# Patient Record
Sex: MALE | Race: WHITE | Employment: UNEMPLOYED | RURAL
[De-identification: names, ages, dates, MRNs, and addresses within clinical notes are randomized per-mention and may not be internally consistent; named-entity substitution may affect disease eponyms.]

---

## 2017-06-02 ENCOUNTER — HOSPITAL ENCOUNTER (OUTPATIENT)
Dept: NEUROLOGY | Age: 42
Discharge: HOME | End: 2017-06-02
Admitting: NURSE PRACTITIONER

## 2017-06-02 DIAGNOSIS — M79.606 PAIN OF LOWER EXTREMITY: ICD-10-CM

## 2018-12-10 ENCOUNTER — APPOINTMENT (OUTPATIENT)
Dept: GENERAL RADIOLOGY | Facility: HOSPITAL | Age: 43
End: 2018-12-10

## 2018-12-10 ENCOUNTER — HOSPITAL ENCOUNTER (EMERGENCY)
Facility: HOSPITAL | Age: 43
Discharge: HOME | End: 2018-12-10
Attending: EMERGENCY MEDICINE

## 2018-12-10 VITALS
OXYGEN SATURATION: 100 % | BODY MASS INDEX: 19.99 KG/M2 | DIASTOLIC BLOOD PRESSURE: 81 MMHG | SYSTOLIC BLOOD PRESSURE: 123 MMHG | RESPIRATION RATE: 18 BRPM | TEMPERATURE: 98.2 F | HEART RATE: 90 BPM | WEIGHT: 120 LBS | HEIGHT: 65 IN

## 2018-12-10 DIAGNOSIS — S20.211A CONTUSION OF RIGHT CHEST WALL, INITIAL ENCOUNTER: Primary | ICD-10-CM

## 2018-12-10 PROCEDURE — 73030 X-RAY EXAM OF SHOULDER: CPT

## 2018-12-10 PROCEDURE — 71101 X-RAY EXAM UNILAT RIBS/CHEST: CPT

## 2018-12-10 PROCEDURE — 99283 EMERGENCY DEPT VISIT LOW MDM: CPT

## 2018-12-10 PROCEDURE — 6370000000 HC RX 637 (ALT 250 FOR IP): Performed by: EMERGENCY MEDICINE

## 2018-12-10 RX ORDER — HYDROCODONE BITARTRATE AND ACETAMINOPHEN 7.5; 325 MG/1; MG/1
1 TABLET ORAL EVERY 6 HOURS PRN
Qty: 12 TABLET | Refills: 0 | Status: SHIPPED | OUTPATIENT
Start: 2018-12-10 | End: 2018-12-13

## 2018-12-10 RX ORDER — HYDROCODONE BITARTRATE AND ACETAMINOPHEN 5; 325 MG/1; MG/1
2 TABLET ORAL ONCE
Status: COMPLETED | OUTPATIENT
Start: 2018-12-10 | End: 2018-12-10

## 2018-12-10 RX ADMIN — HYDROCODONE BITARTRATE AND ACETAMINOPHEN 2 TABLET: 5; 325 TABLET ORAL at 20:51

## 2018-12-10 ASSESSMENT — PAIN SCALES - GENERAL
PAINLEVEL_OUTOF10: 9

## 2018-12-10 ASSESSMENT — PAIN DESCRIPTION - PAIN TYPE: TYPE: ACUTE PAIN

## 2018-12-10 ASSESSMENT — PAIN DESCRIPTION - DESCRIPTORS: DESCRIPTORS: BURNING

## 2018-12-10 ASSESSMENT — PAIN DESCRIPTION - PROGRESSION: CLINICAL_PROGRESSION: GRADUALLY WORSENING

## 2018-12-10 ASSESSMENT — PAIN DESCRIPTION - FREQUENCY: FREQUENCY: CONTINUOUS

## 2018-12-10 ASSESSMENT — PAIN DESCRIPTION - LOCATION: LOCATION: RIB CAGE;SHOULDER

## 2018-12-10 ASSESSMENT — PAIN DESCRIPTION - ORIENTATION: ORIENTATION: RIGHT

## 2018-12-11 NOTE — ED PROVIDER NOTES
8:33 PM          ALLERGIES     Patient has no known allergies. FAMILY HISTORY     History reviewed. No pertinent family history. SOCIAL HISTORY       Social History     Social History    Marital status: Single     Spouse name: N/A    Number of children: N/A    Years of education: N/A     Social History Main Topics    Smoking status: Current Every Day Smoker     Packs/day: 0.50     Types: Cigarettes    Smokeless tobacco: None    Alcohol use No    Drug use: No    Sexual activity: Not Asked     Other Topics Concern    None     Social History Narrative    None         PHYSICAL EXAM    (up to 7 for level 4, 8 or more for level 5)     ED Triage Vitals [12/10/18 1936]   BP Temp Temp Source Pulse Resp SpO2 Height Weight   117/74 98.2 °F (36.8 °C) Oral 87 16 100 % 5' 5\" (1.651 m) 120 lb (54.4 kg)       Physical Exam  General :Patient is awake, alert, oriented, in no acute distress, nontoxic appearing  HEENT: Pupils are equally round and reactive to light, EOMI. Oral mucosa is moist, no exudate. No pharyngeal erythema. Neck: Neck is supple, full range of motion  Cardiac: Heart regular rate, rhythm, no murmurs, rubs, or gallops  Lungs: Lungs are clear to auscultation, there is no wheezing, rhonchi, or rales. Chest wall: There is + tenderness to palpation over the right lower chest wall; there are 2 linear abrasions that are about 7cm long each which are superficial  Abdomen: Abdomen is soft, nontender, nondistended. There is no firm or pulsatile masses, no rebound, rigidity or guarding. Musculoskeletal: Right shoulder with diffuse tenderness to palpation with limited ability to abduct and adduct the shoulder; 5 out of 5 strength in all 4 extremities. No focal muscle deficits are appreciated  Back: No midline or bony tenderness. No CVAT. Neuro:  Motor intact, sensory intact, level of consciousness is normal.  Dermatology: Skin is warm and dry  Psych: Mentation is grossly normal,cognition is grossly

## 2019-04-08 ENCOUNTER — OFFICE VISIT (OUTPATIENT)
Dept: FAMILY MEDICINE CLINIC | Age: 44
End: 2019-04-08
Payer: COMMERCIAL

## 2019-04-08 ENCOUNTER — HOSPITAL ENCOUNTER (OUTPATIENT)
Facility: HOSPITAL | Age: 44
Discharge: HOME | End: 2019-04-08
Payer: COMMERCIAL

## 2019-04-08 VITALS
RESPIRATION RATE: 18 BRPM | WEIGHT: 122 LBS | SYSTOLIC BLOOD PRESSURE: 133 MMHG | OXYGEN SATURATION: 98 % | HEIGHT: 70 IN | HEART RATE: 79 BPM | DIASTOLIC BLOOD PRESSURE: 88 MMHG | BODY MASS INDEX: 17.47 KG/M2 | TEMPERATURE: 97.7 F

## 2019-04-08 DIAGNOSIS — J40 BRONCHITIS: Primary | ICD-10-CM

## 2019-04-08 DIAGNOSIS — R35.0 URINARY FREQUENCY: ICD-10-CM

## 2019-04-08 DIAGNOSIS — R68.89 FLU-LIKE SYMPTOMS: ICD-10-CM

## 2019-04-08 DIAGNOSIS — R53.83 FATIGUE, UNSPECIFIED TYPE: ICD-10-CM

## 2019-04-08 DIAGNOSIS — Z13.220 SCREENING FOR CHOLESTEROL LEVEL: ICD-10-CM

## 2019-04-08 DIAGNOSIS — Z11.4 SCREENING FOR HIV (HUMAN IMMUNODEFICIENCY VIRUS): ICD-10-CM

## 2019-04-08 DIAGNOSIS — K21.9 GASTROESOPHAGEAL REFLUX DISEASE WITHOUT ESOPHAGITIS: ICD-10-CM

## 2019-04-08 LAB
A/G RATIO: 1.9 (ref 0.8–2)
ALBUMIN SERPL-MCNC: 5 G/DL (ref 3.4–4.8)
ALP BLD-CCNC: 75 U/L (ref 25–100)
ALT SERPL-CCNC: 26 U/L (ref 4–36)
ANION GAP SERPL CALCULATED.3IONS-SCNC: 12 MMOL/L (ref 3–16)
APPEARANCE FLUID: CLEAR
AST SERPL-CCNC: 24 U/L (ref 8–33)
BASOPHILS ABSOLUTE: 0.1 K/UL (ref 0–0.1)
BASOPHILS RELATIVE PERCENT: 0.9 %
BILIRUB SERPL-MCNC: 0.7 MG/DL (ref 0.3–1.2)
BILIRUBIN, POC: NEGATIVE
BLOOD URINE, POC: NEGATIVE
BUN BLDV-MCNC: 14 MG/DL (ref 6–20)
CALCIUM SERPL-MCNC: 10 MG/DL (ref 8.5–10.5)
CHLORIDE BLD-SCNC: 99 MMOL/L (ref 98–107)
CHOLESTEROL, TOTAL: 211 MG/DL (ref 0–200)
CHP ED QC CHECK: NORMAL
CLARITY, POC: CLEAR
CO2: 27 MMOL/L (ref 20–30)
COLOR, POC: YELLOW
CREAT SERPL-MCNC: 0.9 MG/DL (ref 0.4–1.2)
EOSINOPHILS ABSOLUTE: 0.4 K/UL (ref 0–0.4)
EOSINOPHILS RELATIVE PERCENT: 5.2 %
GFR AFRICAN AMERICAN: >59
GFR NON-AFRICAN AMERICAN: >59
GLOBULIN: 2.6 G/DL
GLUCOSE BLD-MCNC: 79 MG/DL
GLUCOSE BLD-MCNC: 94 MG/DL (ref 74–106)
GLUCOSE URINE, POC: NEGATIVE
HCT VFR BLD CALC: 46.8 % (ref 40–54)
HDLC SERPL-MCNC: 55 MG/DL (ref 40–60)
HEMOGLOBIN: 15.8 G/DL (ref 13–18)
IMMATURE GRANULOCYTES #: 0 K/UL
IMMATURE GRANULOCYTES %: 0.2 % (ref 0–5)
INFLUENZA A ANTIBODY: NEGATIVE
INFLUENZA B ANTIBODY: NEGATIVE
KETONES, POC: NEGATIVE
LDL CHOLESTEROL CALCULATED: 107 MG/DL
LEUKOCYTE EST, POC: NEGATIVE
LYMPHOCYTES ABSOLUTE: 2.3 K/UL (ref 1.5–4)
LYMPHOCYTES RELATIVE PERCENT: 28 %
MCH RBC QN AUTO: 30.7 PG (ref 27–32)
MCHC RBC AUTO-ENTMCNC: 33.8 G/DL (ref 31–35)
MCV RBC AUTO: 90.9 FL (ref 80–100)
MONOCYTES ABSOLUTE: 0.4 K/UL (ref 0.2–0.8)
MONOCYTES RELATIVE PERCENT: 5.3 %
NEUTROPHILS ABSOLUTE: 4.9 K/UL (ref 2–7.5)
NEUTROPHILS RELATIVE PERCENT: 60.4 %
NITRITE, POC: NEGATIVE
PDW BLD-RTO: 12.1 % (ref 11–16)
PH, POC: 6.5
PLATELET # BLD: 293 K/UL (ref 150–400)
PMV BLD AUTO: 10 FL (ref 6–10)
POTASSIUM SERPL-SCNC: 5 MMOL/L (ref 3.4–5.1)
PROTEIN, POC: NEGATIVE
RBC # BLD: 5.15 M/UL (ref 4.5–6)
SODIUM BLD-SCNC: 138 MMOL/L (ref 136–145)
SPECIFIC GRAVITY, POC: 1.01
TOTAL PROTEIN: 7.6 G/DL (ref 6.4–8.3)
TRIGL SERPL-MCNC: 243 MG/DL (ref 0–249)
TSH REFLEX: 0.57 UIU/ML (ref 0.35–5.5)
UROBILINOGEN, POC: 0.2
VLDLC SERPL CALC-MCNC: 49 MG/DL
WBC # BLD: 8.1 K/UL (ref 4–11)

## 2019-04-08 PROCEDURE — 80061 LIPID PANEL: CPT

## 2019-04-08 PROCEDURE — 87804 INFLUENZA ASSAY W/OPTIC: CPT | Performed by: NURSE PRACTITIONER

## 2019-04-08 PROCEDURE — 87390 HIV-1 AG IA: CPT

## 2019-04-08 PROCEDURE — 4004F PT TOBACCO SCREEN RCVD TLK: CPT | Performed by: NURSE PRACTITIONER

## 2019-04-08 PROCEDURE — 82962 GLUCOSE BLOOD TEST: CPT | Performed by: NURSE PRACTITIONER

## 2019-04-08 PROCEDURE — 99204 OFFICE O/P NEW MOD 45 MIN: CPT | Performed by: NURSE PRACTITIONER

## 2019-04-08 PROCEDURE — 86702 HIV-2 ANTIBODY: CPT

## 2019-04-08 PROCEDURE — 85025 COMPLETE CBC W/AUTO DIFF WBC: CPT

## 2019-04-08 PROCEDURE — G8427 DOCREV CUR MEDS BY ELIG CLIN: HCPCS | Performed by: NURSE PRACTITIONER

## 2019-04-08 PROCEDURE — G8419 CALC BMI OUT NRM PARAM NOF/U: HCPCS | Performed by: NURSE PRACTITIONER

## 2019-04-08 PROCEDURE — 86701 HIV-1ANTIBODY: CPT

## 2019-04-08 PROCEDURE — 80053 COMPREHEN METABOLIC PANEL: CPT

## 2019-04-08 PROCEDURE — 81002 URINALYSIS NONAUTO W/O SCOPE: CPT | Performed by: NURSE PRACTITIONER

## 2019-04-08 PROCEDURE — 84443 ASSAY THYROID STIM HORMONE: CPT

## 2019-04-08 RX ORDER — PANTOPRAZOLE SODIUM 20 MG/1
20 TABLET, DELAYED RELEASE ORAL
Qty: 90 TABLET | Refills: 1 | Status: SHIPPED | OUTPATIENT
Start: 2019-04-08 | End: 2019-05-22 | Stop reason: SDUPTHER

## 2019-04-08 RX ORDER — METHYLPREDNISOLONE 4 MG/1
TABLET ORAL
Qty: 21 TABLET | Refills: 0 | Status: SHIPPED | OUTPATIENT
Start: 2019-04-08 | End: 2019-04-24 | Stop reason: ALTCHOICE

## 2019-04-08 RX ORDER — AZITHROMYCIN 250 MG/1
250 TABLET, FILM COATED ORAL SEE ADMIN INSTRUCTIONS
Qty: 6 TABLET | Refills: 0 | Status: SHIPPED | OUTPATIENT
Start: 2019-04-08 | End: 2019-04-13

## 2019-04-08 ASSESSMENT — ENCOUNTER SYMPTOMS
EYE PAIN: 0
COLOR CHANGE: 0
TROUBLE SWALLOWING: 0
COUGH: 1
SHORTNESS OF BREATH: 0
EYE REDNESS: 0
ABDOMINAL PAIN: 0
SWOLLEN GLANDS: 0
VOMITING: 0
NAUSEA: 0
DIARRHEA: 0
CHEST TIGHTNESS: 0
BACK PAIN: 0
CHANGE IN BOWEL HABIT: 0
SORE THROAT: 0
VISUAL CHANGE: 0

## 2019-04-08 ASSESSMENT — PATIENT HEALTH QUESTIONNAIRE - PHQ9
SUM OF ALL RESPONSES TO PHQ9 QUESTIONS 1 & 2: 0
1. LITTLE INTEREST OR PLEASURE IN DOING THINGS: 0
SUM OF ALL RESPONSES TO PHQ QUESTIONS 1-9: 0
SUM OF ALL RESPONSES TO PHQ QUESTIONS 1-9: 0
2. FEELING DOWN, DEPRESSED OR HOPELESS: 0

## 2019-04-08 NOTE — PROGRESS NOTES
SUBJECTIVE:    Patient ID: Maria Elena Srivastava is a 37 y.o. male. Chief Complaint   Patient presents with    Extremity Weakness     pt states these symptoms started Saturday morning, he woke up drenched in sweat and he states this has happened every morning since Saturday.  Fatigue     states all he wants to do is sleep, he states he is drinking water but he has been urinating alot more than normal for him. Patient presents to the clinic with the following symptoms: Fatigue, generalized weakness, worsening heart burn and is out of his PPI, congestions, mild flu-like symptoms, and increased in urination. Patient is new to the clinic today. Patient here to establish care. Patient also fasting and requesting routine labs today. Health maintenance reviewed with the patient today. Also address the need to follow-up regarding the treatment and managing of his dx of Hep C. Extremity Weakness   This is a new problem. The current episode started in the past 7 days. The problem occurs constantly. The problem has been waxing and waning. Associated symptoms include congestion, coughing, fatigue, urinary symptoms and weakness (generalized). Pertinent negatives include no abdominal pain, anorexia, arthralgias, change in bowel habit, chest pain, chills, diaphoresis, fever, headaches, joint swelling, myalgias, nausea, neck pain, numbness, rash, sore throat, swollen glands, vertigo, visual change or vomiting. The symptoms are aggravated by exertion. He has tried nothing for the symptoms. The treatment provided no relief. Fatigue   This is a new problem. The current episode started in the past 7 days. The problem occurs rarely. The problem has been waxing and waning. Associated symptoms include congestion, coughing, fatigue, urinary symptoms and weakness (generalized).  Pertinent negatives include no abdominal pain, anorexia, arthralgias, change in bowel habit, chest pain, chills, diaphoresis, fever, headaches, normal. He appears well-developed and well-nourished. He is active. Non-toxic appearance. He does not have a sickly appearance. He does not appear ill. No distress. HENT:   Head: Normocephalic and atraumatic. Right Ear: Hearing, external ear and ear canal normal. A middle ear effusion is present. Left Ear: Hearing, external ear and ear canal normal. A middle ear effusion is present. Nose: Rhinorrhea present. No mucosal edema or nose lacerations. Right sinus exhibits no maxillary sinus tenderness and no frontal sinus tenderness. Left sinus exhibits no maxillary sinus tenderness and no frontal sinus tenderness. Mouth/Throat: Uvula is midline and mucous membranes are normal. Normal dentition. No dental caries. Posterior oropharyngeal erythema present. No tonsillar exudate. Eyes: Pupils are equal, round, and reactive to light. Conjunctivae, EOM and lids are normal. Right eye exhibits no discharge. Left eye exhibits no discharge. No scleral icterus. Neck: Trachea normal, normal range of motion, full passive range of motion without pain and phonation normal. Neck supple. Normal carotid pulses, no hepatojugular reflux and no JVD present. No tracheal tenderness present. Carotid bruit is not present. No tracheal deviation present. No thyroid mass and no thyromegaly present. Cardiovascular: Normal rate, regular rhythm, S1 normal, S2 normal, normal heart sounds, intact distal pulses and normal pulses. Exam reveals no gallop, no distant heart sounds and no friction rub. No murmur heard. Pulmonary/Chest: Effort normal. No accessory muscle usage or stridor. No apnea, no tachypnea and no bradypnea. He has no decreased breath sounds. He has wheezes in the right upper field. He has no rhonchi. He has no rales. He exhibits no tenderness. Abdominal: Soft. Normal appearance and bowel sounds are normal. He exhibits no distension and no mass. There is no hepatosplenomegaly, splenomegaly or hepatomegaly.  There is no tenderness. There is no rebound and no guarding. No hernia. Musculoskeletal: Normal range of motion. He exhibits no edema, tenderness or deformity. Lymphadenopathy:     He has no cervical adenopathy. He has no axillary adenopathy. Neurological: He is alert and oriented to person, place, and time. He has normal strength. He is not disoriented. He displays normal reflexes. No cranial nerve deficit or sensory deficit. He exhibits normal muscle tone. Coordination normal. GCS eye subscore is 4. GCS verbal subscore is 5. GCS motor subscore is 6. Skin: Skin is warm, dry and intact. Capillary refill takes less than 2 seconds. No bruising and no rash noted. He is not diaphoretic. No erythema. No pallor. Psychiatric: He has a normal mood and affect. His speech is normal and behavior is normal. Judgment and thought content normal. Cognition and memory are normal.   Nursing note and vitals reviewed. Results in Past 30 Days  Result Component Current Result Ref Range Previous Result Ref Range   Glucose 79 (4/8/2019) mg/dL Not in Time Range      No results found for: Svetlana Friedman, LDLCALC      Lab Results   Component Value Date    WBC 15.2 11/27/2012    HGB 15.5 11/27/2012    HCT 44.6 11/27/2012    MCV 88.7 11/27/2012     11/27/2012     ASSESSMENT/PLAN:     Raghu Leroy was seen today for extremity weakness and fatigue. Diagnoses and all orders for this visit:    Bronchitis  -     methylPREDNISolone (MEDROL DOSEPACK) 4 MG tablet; Take by mouth 685644 stop  -     azithromycin (ZITHROMAX) 250 MG tablet; Take 1 tablet by mouth See Admin Instructions for 5 days 500mg on day 1 followed by 250mg on days 2 - 5    Gastroesophageal reflux disease without esophagitis  -     pantoprazole (PROTONIX) 20 MG tablet; Take 1 tablet by mouth every morning (before breakfast)    Flu-like symptoms  -     POCT Influenza A/B  -     CBC Auto Differential; Future  -     Comprehensive Metabolic Panel;  Future    Urinary frequency  - POCT Urinalysis no Micro  -     Cancel: POC Glucose Fingerstick  -     TSH with Reflex; Future  -     POCT Glucose    Screening for HIV (human immunodeficiency virus)  -     HIV-1 AND HIV-2 ANTIBODIES; Future    Screening for cholesterol level  -     Lipid Panel; Future    Fatigue, unspecified type  -     CBC Auto Differential; Future  -     Comprehensive Metabolic Panel; Future  -     TSH with Reflex; Future  -     POCT Glucose        There are no discontinued medications. Return in about 9 days (around 4/17/2019) for blood work. SEAN Barroso - CNP     Education was provided for discussed topics. Call the office with worsening complaints or any side effects to any medications. If an emergency please call 911.

## 2019-04-09 LAB
HIV AG/AB: NORMAL
HIV ANTIGEN: NORMAL
HIV-1 ANTIBODY: NORMAL
HIV-2 AB: NORMAL

## 2019-04-24 ENCOUNTER — OFFICE VISIT (OUTPATIENT)
Dept: FAMILY MEDICINE CLINIC | Age: 44
End: 2019-04-24
Payer: COMMERCIAL

## 2019-04-24 VITALS
DIASTOLIC BLOOD PRESSURE: 84 MMHG | RESPIRATION RATE: 16 BRPM | OXYGEN SATURATION: 96 % | SYSTOLIC BLOOD PRESSURE: 131 MMHG | BODY MASS INDEX: 16.89 KG/M2 | WEIGHT: 118 LBS | HEART RATE: 102 BPM | HEIGHT: 70 IN

## 2019-04-24 DIAGNOSIS — M25.511 RIGHT SHOULDER PAIN, UNSPECIFIED CHRONICITY: Primary | ICD-10-CM

## 2019-04-24 DIAGNOSIS — Z23 NEED FOR PNEUMOCOCCAL VACCINATION: ICD-10-CM

## 2019-04-24 PROCEDURE — 90471 IMMUNIZATION ADMIN: CPT | Performed by: NURSE PRACTITIONER

## 2019-04-24 PROCEDURE — 90732 PPSV23 VACC 2 YRS+ SUBQ/IM: CPT | Performed by: NURSE PRACTITIONER

## 2019-04-24 PROCEDURE — G8427 DOCREV CUR MEDS BY ELIG CLIN: HCPCS | Performed by: NURSE PRACTITIONER

## 2019-04-24 PROCEDURE — 99213 OFFICE O/P EST LOW 20 MIN: CPT | Performed by: NURSE PRACTITIONER

## 2019-04-24 PROCEDURE — 4004F PT TOBACCO SCREEN RCVD TLK: CPT | Performed by: NURSE PRACTITIONER

## 2019-04-24 PROCEDURE — G8419 CALC BMI OUT NRM PARAM NOF/U: HCPCS | Performed by: NURSE PRACTITIONER

## 2019-04-24 ASSESSMENT — ENCOUNTER SYMPTOMS
BACK PAIN: 0
SHORTNESS OF BREATH: 0
EYE PAIN: 0
SORE THROAT: 0
DIARRHEA: 0
ABDOMINAL PAIN: 0
COUGH: 0
VOMITING: 0
EYE REDNESS: 0
TROUBLE SWALLOWING: 0
NAUSEA: 0
COLOR CHANGE: 0
CHEST TIGHTNESS: 0

## 2019-04-24 NOTE — PROGRESS NOTES
not appear ill. No distress. HENT:   Head: Normocephalic and atraumatic. Right Ear: Hearing, tympanic membrane, external ear and ear canal normal.   Left Ear: Hearing, tympanic membrane, external ear and ear canal normal.   Nose: Nose normal. No mucosal edema, rhinorrhea or nose lacerations. Right sinus exhibits no maxillary sinus tenderness and no frontal sinus tenderness. Left sinus exhibits no maxillary sinus tenderness and no frontal sinus tenderness. Mouth/Throat: Uvula is midline, oropharynx is clear and moist and mucous membranes are normal. Normal dentition. No dental caries. No tonsillar exudate. Eyes: Pupils are equal, round, and reactive to light. Conjunctivae, EOM and lids are normal. Right eye exhibits no discharge. Left eye exhibits no discharge. No scleral icterus. Neck: Trachea normal, normal range of motion, full passive range of motion without pain and phonation normal. Neck supple. Normal carotid pulses, no hepatojugular reflux and no JVD present. No tracheal tenderness present. Carotid bruit is not present. No tracheal deviation present. No thyroid mass and no thyromegaly present. Cardiovascular: Normal rate, regular rhythm, S1 normal, S2 normal, normal heart sounds, intact distal pulses and normal pulses. Exam reveals no gallop, no distant heart sounds and no friction rub. No murmur heard. Pulmonary/Chest: Effort normal and breath sounds normal. No accessory muscle usage or stridor. No apnea, no tachypnea and no bradypnea. He has no decreased breath sounds. He has no wheezes. He has no rhonchi. He has no rales. He exhibits no tenderness. Abdominal: Soft. Normal appearance and bowel sounds are normal. He exhibits no distension and no mass. There is no hepatosplenomegaly, splenomegaly or hepatomegaly. There is no tenderness. There is no rebound and no guarding. No hernia. Musculoskeletal: Normal range of motion. He exhibits no edema, tenderness or deformity.    Lymphadenopathy: He has no cervical adenopathy. He has no axillary adenopathy. Neurological: He is alert and oriented to person, place, and time. He has normal strength. He is not disoriented. He displays normal reflexes. No cranial nerve deficit or sensory deficit. He exhibits normal muscle tone. Coordination normal. GCS eye subscore is 4. GCS verbal subscore is 5. GCS motor subscore is 6. Skin: Skin is warm, dry and intact. Capillary refill takes less than 2 seconds. No bruising and no rash noted. He is not diaphoretic. No erythema. No pallor. Psychiatric: He has a normal mood and affect. His speech is normal and behavior is normal. Judgment and thought content normal. Cognition and memory are normal.   Nursing note and vitals reviewed.     Results in Past 30 Days  Result Component Current Result Ref Range Previous Result Ref Range   Alb 5.0 (H) (4/8/2019) 3.4 - 4.8 g/dL Not in Time Range    Albumin/Globulin Ratio 1.9 (4/8/2019) 0.8 - 2.0 Not in Time Range    Alkaline Phosphatase 75 (4/8/2019) 25 - 100 U/L Not in Time Range    ALT 26 (4/8/2019) 4 - 36 U/L Not in Time Range    AST 24 (4/8/2019) 8 - 33 U/L Not in Time Range    BUN 14 (4/8/2019) 6 - 20 mg/dL Not in Time Range    Calcium 10.0 (4/8/2019) 8.5 - 10.5 mg/dL Not in Time Range    Chloride 99 (4/8/2019) 98 - 107 mmol/L Not in Time Range    CO2 27 (4/8/2019) 20 - 30 mmol/L Not in Time Range    CREATININE 0.9 (4/8/2019) 0.4 - 1.2 mg/dL Not in Time Range    GFR  >59 (4/8/2019) >59 Not in Time Range    GFR Non- >59 (4/8/2019) >59 Not in Time Range    Globulin 2.6 (4/8/2019) g/dL Not in Time Range    Glucose 79 (4/8/2019) mg/dL 94 (4/8/2019) 74 - 106 mg/dL   Potassium 5.0 (4/8/2019) 3.4 - 5.1 mmol/L Not in Time Range    Sodium 138 (4/8/2019) 136 - 145 mmol/L Not in Time Range    Total Bilirubin 0.7 (4/8/2019) 0.3 - 1.2 mg/dL Not in Time Range    Total Protein 7.6 (4/8/2019) 6.4 - 8.3 g/dL Not in Time Range      LDL Calculated (mg/dL) Date Value   04/08/2019 107       Lab Results   Component Value Date    WBC 8.1 04/08/2019    NEUTROABS 4.9 04/08/2019    HGB 15.8 04/08/2019    HCT 46.8 04/08/2019    MCV 90.9 04/08/2019     04/08/2019       ASSESSMENT/PLAN:     Adela Portillo was seen today for follow-up. Diagnoses and all orders for this visit:    Right shoulder pain, unspecified chronicity  -     MRI Upper Extremity Right W JT WO Contrast; Future        PATIENT COUNSELING     Counseling was provided today regarding the following topics: Healthy eating habits, Regular exercise, substance abuse and healthy sleep habits. Discussed use, benefit, and side effects of prescribed medications. Barriers to medication compliance addressed. All patient questions answered. Patient voiced understanding. Medications Discontinued During This Encounter   Medication Reason    methylPREDNISolone (MEDROL DOSEPACK) 4 MG tablet Therapy completed       No follow-ups on file. SEAN Randhawa - CNP     Education was provided for discussed topics. Call the office with worsening complaints or any side effects to any medications. If an emergency please call 911.     Shawn Candelaria, MSN, APRN, FNP-BC, NP-C

## 2019-04-24 NOTE — PROGRESS NOTES
SUBJECTIVE:    Patient ID: Hermann Ramírez is a 37 y.o. male. Chief Complaint   Patient presents with    Follow-up     pt here today for follow up with lab work        Patient presents to the clinic today for routine follow-up and to discuss recent blood work results. Patient and I have discussed all of his labs results in depth. No abnormal results noted other than slightly evelated total cholesterol. Discussed ways to improve this with diet and exercise. Patient verbalized understanding and we will recheck in 3-6 months. Patient acutely c/o right shoulder pain that has been a chronic issue. Patient has an extra a few months ago during an ER encounter. This results were normal without fracture. Patient continues to have decreased ROM and pain throughout the day this pain is starting to affect the patients performance at work. MRI needed to r/o ligiment or rotator cuff injury. Patient's medications, allergies, past medical, surgical, social and family histories were reviewed and updated as appropriate. Current Outpatient Medications on File Prior to Visit   Medication Sig Dispense Refill    pantoprazole (PROTONIX) 20 MG tablet Take 1 tablet by mouth every morning (before breakfast) 90 tablet 1     No current facility-administered medications on file prior to visit. Review of Systems   Constitutional: Negative for activity change, appetite change, chills and fever. HENT: Negative for congestion, ear pain, nosebleeds, sore throat and trouble swallowing. Eyes: Negative for pain and redness. Respiratory: Negative for cough, chest tightness and shortness of breath. Cardiovascular: Negative for chest pain, palpitations and leg swelling. Gastrointestinal: Negative for abdominal pain, diarrhea, nausea and vomiting. Genitourinary: Negative for difficulty urinating, dysuria and flank pain.    Musculoskeletal: Positive for arthralgias (right shoulder pain that is worsening over time and person, place, and time. No sensory deficit. Coordination normal.   Skin: Skin is warm and dry. Capillary refill takes less than 2 seconds. No rash noted. He is not diaphoretic. No erythema. No pallor. Psychiatric: He has a normal mood and affect. His behavior is normal. Judgment and thought content normal.   Nursing note and vitals reviewed. Results in Past 30 Days  Result Component Current Result Ref Range Previous Result Ref Range   Alb 5.0 (H) (4/8/2019) 3.4 - 4.8 g/dL Not in Time Range    Albumin/Globulin Ratio 1.9 (4/8/2019) 0.8 - 2.0 Not in Time Range    Alkaline Phosphatase 75 (4/8/2019) 25 - 100 U/L Not in Time Range    ALT 26 (4/8/2019) 4 - 36 U/L Not in Time Range    AST 24 (4/8/2019) 8 - 33 U/L Not in Time Range    BUN 14 (4/8/2019) 6 - 20 mg/dL Not in Time Range    Calcium 10.0 (4/8/2019) 8.5 - 10.5 mg/dL Not in Time Range    Chloride 99 (4/8/2019) 98 - 107 mmol/L Not in Time Range    CO2 27 (4/8/2019) 20 - 30 mmol/L Not in Time Range    CREATININE 0.9 (4/8/2019) 0.4 - 1.2 mg/dL Not in Time Range    GFR  >59 (4/8/2019) >59 Not in Time Range    GFR Non- >59 (4/8/2019) >59 Not in Time Range    Globulin 2.6 (4/8/2019) g/dL Not in Time Range    Glucose 79 (4/8/2019) mg/dL 94 (4/8/2019) 74 - 106 mg/dL   Potassium 5.0 (4/8/2019) 3.4 - 5.1 mmol/L Not in Time Range    Sodium 138 (4/8/2019) 136 - 145 mmol/L Not in Time Range    Total Bilirubin 0.7 (4/8/2019) 0.3 - 1.2 mg/dL Not in Time Range    Total Protein 7.6 (4/8/2019) 6.4 - 8.3 g/dL Not in Time Range        LDL Calculated (mg/dL)   Date Value   04/08/2019 107         Lab Results   Component Value Date    WBC 8.1 04/08/2019    NEUTROABS 4.9 04/08/2019    HGB 15.8 04/08/2019    HCT 46.8 04/08/2019    MCV 90.9 04/08/2019     04/08/2019       No results found for: TSH      ASSESSMENT/PLAN:     Power Snyder was seen today for follow-up.     Diagnoses and all orders for this visit:    Right shoulder pain, unspecified chronicity  -     MRI Upper Extremity Right W JT WO Contrast; Future  - Patient reports he will continue taking OTC NSAIDS for pain control.  - Patient reports he doesn't want a muscle relaxer because they make him drowsy. - Patient only concerned of shoulder injury and needing to find out what's going on with his right shoulder. MRI needed to make diagnostic approach.   - Offered right shoulder joint injection to the patient-Denied. - Discussed PT after MRI results and possible Orthopedic referral.    Need for pneumococcal vaccination  -     PNEUMOVAX 23 subcutaneous/IM (Pneumococcal polysaccharide vaccine 23-valent >= 1yo)        PATIENT COUNSELING     Counseling was provided today regarding the following topics: Healthy eating habits, Regular exercise, substance abuse and healthy sleep habits. Discussed use, benefit, and side effects of prescribed medications. Barriers to medication compliance addressed. All patient questions answered. Patient voiced understanding. Medications Discontinued During This Encounter   Medication Reason    methylPREDNISolone (MEDROL DOSEPACK) 4 MG tablet Therapy completed       Return in about 1 month (around 5/24/2019). SEAN Walker - CNP     Education was provided for discussed topics. Call the office with worsening complaints or any side effects to any medications. If an emergency please call 911.     Enrico Jade, MSN, APRN, FNP-BC, NP-C

## 2019-04-26 ASSESSMENT — ENCOUNTER SYMPTOMS
TROUBLE SWALLOWING: 0
ABDOMINAL PAIN: 0
SHORTNESS OF BREATH: 0
CHEST TIGHTNESS: 0
NAUSEA: 0
COUGH: 0
COLOR CHANGE: 0
DIARRHEA: 0
EYE PAIN: 0
SORE THROAT: 0
VOMITING: 0
EYE REDNESS: 0
BACK PAIN: 0

## 2019-04-29 DIAGNOSIS — M25.511 CHRONIC RIGHT SHOULDER PAIN: Primary | ICD-10-CM

## 2019-04-29 DIAGNOSIS — G89.29 CHRONIC RIGHT SHOULDER PAIN: Primary | ICD-10-CM

## 2019-05-02 ENCOUNTER — HOSPITAL ENCOUNTER (OUTPATIENT)
Dept: MRI IMAGING | Facility: HOSPITAL | Age: 44
Discharge: HOME | End: 2019-05-02
Payer: COMMERCIAL

## 2019-05-02 DIAGNOSIS — M25.511 CHRONIC RIGHT SHOULDER PAIN: ICD-10-CM

## 2019-05-02 DIAGNOSIS — G89.29 CHRONIC RIGHT SHOULDER PAIN: ICD-10-CM

## 2019-05-02 PROCEDURE — 73221 MRI JOINT UPR EXTREM W/O DYE: CPT

## 2019-05-07 ENCOUNTER — OFFICE VISIT (OUTPATIENT)
Dept: FAMILY MEDICINE CLINIC | Age: 44
End: 2019-05-07
Payer: COMMERCIAL

## 2019-05-07 VITALS
WEIGHT: 119 LBS | SYSTOLIC BLOOD PRESSURE: 119 MMHG | HEART RATE: 68 BPM | BODY MASS INDEX: 17.07 KG/M2 | DIASTOLIC BLOOD PRESSURE: 77 MMHG | OXYGEN SATURATION: 96 %

## 2019-05-07 DIAGNOSIS — M25.511 CHRONIC RIGHT SHOULDER PAIN: Primary | ICD-10-CM

## 2019-05-07 DIAGNOSIS — M25.511 RIGHT SHOULDER PAIN, UNSPECIFIED CHRONICITY: ICD-10-CM

## 2019-05-07 DIAGNOSIS — G89.29 CHRONIC RIGHT SHOULDER PAIN: Primary | ICD-10-CM

## 2019-05-07 PROCEDURE — 4004F PT TOBACCO SCREEN RCVD TLK: CPT | Performed by: NURSE PRACTITIONER

## 2019-05-07 PROCEDURE — 99214 OFFICE O/P EST MOD 30 MIN: CPT | Performed by: NURSE PRACTITIONER

## 2019-05-07 PROCEDURE — G8419 CALC BMI OUT NRM PARAM NOF/U: HCPCS | Performed by: NURSE PRACTITIONER

## 2019-05-07 PROCEDURE — 20610 DRAIN/INJ JOINT/BURSA W/O US: CPT | Performed by: NURSE PRACTITIONER

## 2019-05-07 PROCEDURE — G8427 DOCREV CUR MEDS BY ELIG CLIN: HCPCS | Performed by: NURSE PRACTITIONER

## 2019-05-07 RX ORDER — LIDOCAINE HYDROCHLORIDE 10 MG/ML
4 INJECTION, SOLUTION EPIDURAL; INFILTRATION; INTRACAUDAL; PERINEURAL ONCE
Status: COMPLETED | OUTPATIENT
Start: 2019-05-07 | End: 2019-05-07

## 2019-05-07 RX ORDER — TRIAMCINOLONE ACETONIDE 40 MG/ML
80 INJECTION, SUSPENSION INTRA-ARTICULAR; INTRAMUSCULAR ONCE
Status: COMPLETED | OUTPATIENT
Start: 2019-05-07 | End: 2019-05-07

## 2019-05-07 RX ADMIN — TRIAMCINOLONE ACETONIDE 80 MG: 40 INJECTION, SUSPENSION INTRA-ARTICULAR; INTRAMUSCULAR at 11:36

## 2019-05-07 RX ADMIN — LIDOCAINE HYDROCHLORIDE 4 ML: 10 INJECTION, SOLUTION EPIDURAL; INFILTRATION; INTRACAUDAL; PERINEURAL at 11:37

## 2019-05-07 ASSESSMENT — ENCOUNTER SYMPTOMS
SORE THROAT: 0
EYE REDNESS: 0
NAUSEA: 0
TROUBLE SWALLOWING: 0
VOMITING: 0
COLOR CHANGE: 0
COUGH: 0
SHORTNESS OF BREATH: 0
EYE PAIN: 0
DIARRHEA: 0
ABDOMINAL PAIN: 0
BACK PAIN: 0
CHEST TIGHTNESS: 0

## 2019-05-07 NOTE — PROGRESS NOTES
Have you seen any other physician or provider since your last visit no    Have you had any other diagnostic tests since your last visit? yes - MRI Right Shoulder    Have you changed or stopped any medications since your last visit?  No

## 2019-05-07 NOTE — PROGRESS NOTES
SUBJECTIVE:    Patient ID: Levy Sanchez is a 37 y.o. male. Chief Complaint   Patient presents with    Shoulder Pain     Right Shoulder, Patient following up after MRI, would like to get shoulder injection today       Following up from recent MRI of right shoulder to discuss results. Patient reports he would like to try a steroid joint injection in the right shoulder as prevriously discussed at our last encounter. See below for details. Shoulder Pain    The pain is present in the right shoulder. This is a chronic problem. The current episode started more than 1 year ago. There has been no history of extremity trauma. The problem occurs constantly. The problem has been unchanged. The quality of the pain is described as aching and dull. The pain is at a severity of 6/10. The pain is moderate. Associated symptoms include a limited range of motion and stiffness. Pertinent negatives include no fever, inability to bear weight, itching, joint locking, joint swelling, numbness or tingling. The symptoms are aggravated by activity. He has tried NSAIDS, rest and acetaminophen for the symptoms. The treatment provided mild relief. Family history does not include gout or rheumatoid arthritis. His past medical history is significant for osteoarthritis. There is no history of diabetes, gout or rheumatoid arthritis. Patient's medications, allergies, past medical, surgical, social and family histories were reviewed and updated as appropriate. Current Outpatient Medications on File Prior to Visit   Medication Sig Dispense Refill    pantoprazole (PROTONIX) 20 MG tablet Take 1 tablet by mouth every morning (before breakfast) 90 tablet 1     No current facility-administered medications on file prior to visit. Review of Systems   Constitutional: Negative for activity change, appetite change, chills and fever. HENT: Negative for congestion, ear pain, nosebleeds, sore throat and trouble swallowing.     Eyes: Negative for pain and redness. Respiratory: Negative for cough, chest tightness and shortness of breath. Cardiovascular: Negative for chest pain, palpitations and leg swelling. Gastrointestinal: Negative for abdominal pain, diarrhea, nausea and vomiting. Genitourinary: Negative for difficulty urinating, dysuria and flank pain. Musculoskeletal: Positive for arthralgias (right shoulder and joint pain that has been a chronic issue. No recent injury reported) and stiffness. Negative for back pain, gait problem and gout. Skin: Negative for color change, itching, pallor, rash and wound. Allergic/Immunologic: Negative for environmental allergies and food allergies. Neurological: Negative for dizziness, tingling, numbness and headaches. Hematological: Negative for adenopathy. Does not bruise/bleed easily. Psychiatric/Behavioral: Negative for agitation and sleep disturbance. The patient is not nervous/anxious. OBJECTIVE:  /77 (Site: Right Upper Arm, Position: Sitting)   Pulse 68   Wt 119 lb (54 kg)   SpO2 96%   BMI 17.07 kg/m²       Physical Exam   Constitutional: He is oriented to person, place, and time. Vital signs are normal. He appears well-developed and well-nourished. He is active. Non-toxic appearance. He does not have a sickly appearance. He does not appear ill. No distress. HENT:   Head: Normocephalic and atraumatic. Right Ear: Hearing, tympanic membrane, external ear and ear canal normal.   Left Ear: Hearing, tympanic membrane, external ear and ear canal normal.   Nose: Nose normal. No mucosal edema, rhinorrhea or nose lacerations. Right sinus exhibits no maxillary sinus tenderness and no frontal sinus tenderness. Left sinus exhibits no maxillary sinus tenderness and no frontal sinus tenderness. Mouth/Throat: Uvula is midline, oropharynx is clear and moist and mucous membranes are normal. Normal dentition. No dental caries. No oropharyngeal exudate.  No tonsillar diaphoretic. No erythema. No pallor. Psychiatric: He has a normal mood and affect. His speech is normal and behavior is normal. Judgment and thought content normal. Cognition and memory are normal.   Nursing note and vitals reviewed. Results in Past 30 Days  Result Component Current Result Ref Range Previous Result Ref Range   Alb 5.0 (H) (4/8/2019) 3.4 - 4.8 g/dL Not in Time Range    Albumin/Globulin Ratio 1.9 (4/8/2019) 0.8 - 2.0 Not in Time Range    Alkaline Phosphatase 75 (4/8/2019) 25 - 100 U/L Not in Time Range    ALT 26 (4/8/2019) 4 - 36 U/L Not in Time Range    AST 24 (4/8/2019) 8 - 33 U/L Not in Time Range    BUN 14 (4/8/2019) 6 - 20 mg/dL Not in Time Range    Calcium 10.0 (4/8/2019) 8.5 - 10.5 mg/dL Not in Time Range    Chloride 99 (4/8/2019) 98 - 107 mmol/L Not in Time Range    CO2 27 (4/8/2019) 20 - 30 mmol/L Not in Time Range    CREATININE 0.9 (4/8/2019) 0.4 - 1.2 mg/dL Not in Time Range    GFR  >59 (4/8/2019) >59 Not in Time Range    GFR Non- >59 (4/8/2019) >59 Not in Time Range    Globulin 2.6 (4/8/2019) g/dL Not in Time Range    Glucose 79 (4/8/2019) mg/dL 94 (4/8/2019) 74 - 106 mg/dL   Potassium 5.0 (4/8/2019) 3.4 - 5.1 mmol/L Not in Time Range    Sodium 138 (4/8/2019) 136 - 145 mmol/L Not in Time Range    Total Bilirubin 0.7 (4/8/2019) 0.3 - 1.2 mg/dL Not in Time Range    Total Protein 7.6 (4/8/2019) 6.4 - 8.3 g/dL Not in Time Range      LDL Calculated (mg/dL)   Date Value   04/08/2019 107       Lab Results   Component Value Date    WBC 8.1 04/08/2019    NEUTROABS 4.9 04/08/2019    HGB 15.8 04/08/2019    HCT 46.8 04/08/2019    MCV 90.9 04/08/2019     04/08/2019       ASSESSMENT/PLAN:     Earlene Scott was seen today for shoulder pain.     Diagnoses and all orders for this visit:    Chronic right shoulder pain  -     triamcinolone acetonide (KENALOG-40) injection 80 mg  -     lidocaine PF 1 % injection 4 mL  -     19212 - ID DRAIN/INJECT LARGE JOINT/BURSA  - A steroid injection was performed at right shoulder/joint using 1% plain  Lidocaine 4 ml and 80 mg of Kenalog. This was well tolerated. - Consent signed by patient  - Educated patient on procedure and after procedure care. Right shoulder pain, unspecified chronicity  -     triamcinolone acetonide (KENALOG-40) injection 80 mg  -     lidocaine PF 1 % injection 4 mL  -     08011 - NE DRAIN/INJECT LARGE JOINT/BURSA    PATIENT COUNSELING     Counseling was provided today regarding the following topics: Healthy eating habits, Regular exercise, substance abuse and healthy sleep habits. Discussed use, benefit, and side effects of prescribed medications. Barriers to medication compliance addressed. All patient questions answered. Patient voiced understanding. There are no discontinued medications. Return in about 1 month (around 6/7/2019). SEAN Núñez - CNP     Education was provided for discussed topics. Call the office with worsening complaints or any side effects to any medications. If an emergency please call 911.     Ben Hayes, MSN, APRN, FNP-BC, NP-C

## 2019-05-14 ENCOUNTER — PATIENT MESSAGE (OUTPATIENT)
Dept: FAMILY MEDICINE CLINIC | Age: 44
End: 2019-05-14

## 2019-05-14 ENCOUNTER — OFFICE VISIT (OUTPATIENT)
Dept: FAMILY MEDICINE CLINIC | Age: 44
End: 2019-05-14
Payer: COMMERCIAL

## 2019-05-14 VITALS
BODY MASS INDEX: 16.54 KG/M2 | WEIGHT: 115.3 LBS | DIASTOLIC BLOOD PRESSURE: 82 MMHG | OXYGEN SATURATION: 95 % | HEART RATE: 89 BPM | SYSTOLIC BLOOD PRESSURE: 116 MMHG | RESPIRATION RATE: 18 BRPM

## 2019-05-14 DIAGNOSIS — M77.8 RIGHT SHOULDER TENDONITIS: Primary | ICD-10-CM

## 2019-05-14 PROCEDURE — 4004F PT TOBACCO SCREEN RCVD TLK: CPT | Performed by: NURSE PRACTITIONER

## 2019-05-14 PROCEDURE — 99213 OFFICE O/P EST LOW 20 MIN: CPT | Performed by: NURSE PRACTITIONER

## 2019-05-14 PROCEDURE — G8427 DOCREV CUR MEDS BY ELIG CLIN: HCPCS | Performed by: NURSE PRACTITIONER

## 2019-05-14 PROCEDURE — G8419 CALC BMI OUT NRM PARAM NOF/U: HCPCS | Performed by: NURSE PRACTITIONER

## 2019-05-14 RX ORDER — IBUPROFEN 800 MG/1
800 TABLET ORAL
Qty: 90 TABLET | Refills: 3 | Status: SHIPPED | OUTPATIENT
Start: 2019-05-14 | End: 2019-11-01 | Stop reason: SDUPTHER

## 2019-05-14 ASSESSMENT — ENCOUNTER SYMPTOMS
RESPIRATORY NEGATIVE: 1
EYES NEGATIVE: 1
ALLERGIC/IMMUNOLOGIC NEGATIVE: 1
GASTROINTESTINAL NEGATIVE: 1

## 2019-05-14 NOTE — TELEPHONE ENCOUNTER
Addressed issue with patient. I let him know Sharad Jaymie had been busy today and he verbalized understanding and thanked me for letting him know.

## 2019-05-14 NOTE — TELEPHONE ENCOUNTER
From: Dmitri Rangel  To: SEAN Sunshine NP  Sent: 5/14/2019 8:50 AM EDT  Subject: Alcohol & Tobacco Use    History questionnaire submitted on Tuesday May 14,   2019 at 8:49:58 AM  Questionnaire: Alcohol & Tobacco Use  Patient: Dmitri Rangel [[id number]]      Social History:    Question: Alcohol Use  Response: No    Question: Tobacco Use  Response: Current Everyday Smoker  Types:   Packs/day: 0.5  Years: 29  Start Date: 9/1/1991  Quit Date:   Comments: I hate it, but I don't see myself quitting.

## 2019-05-14 NOTE — PROGRESS NOTES
SUBJECTIVE:    Rene Kuhn is a 37 y.o. male    Pt presents to follow up for right shoulder pain. Pt has been under the care of SEAN Carbone for right shoulder pain. Pt report he had a DepoMedrol/lidocaine injection right shoulder on 05/7/19 with no improvement of symptoms. Pt c/o stinging pain to right posterior shoulder since injection. MRI right shoulder     HISTORY: Chronic shoulder pain     Correlation with conventional radiograph 12/10/2018     Acromioclavicular alignment is anatomical. Tiny subclavicular spur is without encroachment of the medial subacromial arch.     Amorphous T2 hyperintensity within the supraspinatus tendon is consistent with tendinosis. Infraspinatus and teres minor tendons normal. Subscapularis insertion and long head biceps tendon intact.     Fluid in the subacromial bursa is consistent with bursitis     Coracohumeral and transverse humeral ligaments normal. Subcoracoid fat pad is preserved.     Glenohumeral alignment normal. Anteroinferior, posterior and anterosuperior glenoid chris are intact.     Focal bone marrow edema involves the greater tuberosity. Underlying cystic erosions may be enthesopathic in nature or could be associated with prior micron impaction phenomenon. Muscles of the shoulder girdle are well developed        Impression     Mild supraspinatus tendinosis     Mild subacromial bursitis     Focal bone marrow edema at the greater tuberosity either associated with bone contusion secondary to impact injury or perhaps secondary to enthesopathy with associated cystic erosion                 Shoulder Pain    The pain is present in the right shoulder. This is a new problem. The current episode started more than 1 month ago (6 months- no known injury- was lifting heavy boxes during the time of onset. ). The problem occurs constantly (constant ache- worse with position). The problem has been gradually worsening. The quality of the pain is described as aching.  The pain is at a severity of 5/10. The pain is moderate. Associated symptoms include a limited range of motion (due to pain). Pertinent negatives include no fever, inability to bear weight, itching, joint locking, joint swelling, numbness, stiffness or tingling. Treatments tried: depomedrol/lidocaine injection. The treatment provided no relief. Chief Complaint   Patient presents with    Shoulder Pain     pt states Jose Rafael Tuttle gave him an injection in his shoulder last week. he states he has not noticed improvement        Review of Systems   Constitutional: Negative. Negative for fever. HENT: Negative. Eyes: Negative. Respiratory: Negative. Cardiovascular: Negative. Gastrointestinal: Negative. Endocrine: Negative. Genitourinary: Negative. Musculoskeletal: Positive for arthralgias (right shoulder pain). Negative for stiffness. Skin: Negative. Negative for itching. Allergic/Immunologic: Negative. Neurological: Negative. Negative for tingling and numbness. Hematological: Negative. Psychiatric/Behavioral: Negative. OBJECTIVE:    /82   Pulse 89   Resp 18   Wt 115 lb 4.8 oz (52.3 kg)   SpO2 95%   BMI 16.54 kg/m²    Physical Exam   Constitutional: He is oriented to person, place, and time. He appears well-developed. Neck: Carotid bruit is not present. No thyromegaly present. Cardiovascular: Normal rate, regular rhythm, normal heart sounds and intact distal pulses. No murmur heard. Pulmonary/Chest: Effort normal and breath sounds normal.   Musculoskeletal:        Right shoulder: He exhibits decreased range of motion, tenderness and pain. He exhibits no bony tenderness, no swelling, no effusion, no crepitus, no deformity, no laceration, no spasm, normal pulse and normal strength. Neurological: He is alert and oriented to person, place, and time. Skin: Skin is warm and dry. Psychiatric: He has a normal mood and affect.  His behavior is normal.   Nursing note and vitals reviewed. ASSESSMENT/PLAN:   Carl Shore was seen today for shoulder pain. Diagnoses and all orders for this visit:    Right shoulder tendonitis  -     ibuprofen (ADVIL;MOTRIN) 800 MG tablet; Take 1 tablet by mouth 3 times daily (with meals)  -     External Referral To Orthopedic Surgery  -pt advised to rest, apply ice TID and PRN, no lifting until evaluated by ortho. Pt verbalized understanding      Return if symptoms worsen or fail to improve. Current Outpatient Medications on File Prior to Visit   Medication Sig Dispense Refill    pantoprazole (PROTONIX) 20 MG tablet Take 1 tablet by mouth every morning (before breakfast) 90 tablet 1     No current facility-administered medications on file prior to visit.

## 2019-05-21 ENCOUNTER — PATIENT MESSAGE (OUTPATIENT)
Dept: FAMILY MEDICINE CLINIC | Age: 44
End: 2019-05-21

## 2019-05-21 NOTE — TELEPHONE ENCOUNTER
Called pt to check on him from his [employer] message. He stated he walked to town and back and that was when he was when he was feeling like he may be having a nervous breakdown. Pt states he is feeling better at this point. I offered pt an appt tomorrow for 1 PM. He agreed to come see Shey Hudson so that he can get him scheduled for a couple other tests if needed.

## 2019-05-22 ENCOUNTER — OFFICE VISIT (OUTPATIENT)
Dept: FAMILY MEDICINE CLINIC | Age: 44
End: 2019-05-22
Payer: COMMERCIAL

## 2019-05-22 ENCOUNTER — HOSPITAL ENCOUNTER (OUTPATIENT)
Facility: HOSPITAL | Age: 44
Discharge: HOME | End: 2019-05-22
Payer: COMMERCIAL

## 2019-05-22 DIAGNOSIS — B18.2 CHRONIC HEPATITIS C WITHOUT HEPATIC COMA (HCC): ICD-10-CM

## 2019-05-22 DIAGNOSIS — R10.11 RUQ PAIN: Primary | ICD-10-CM

## 2019-05-22 DIAGNOSIS — K21.9 GASTROESOPHAGEAL REFLUX DISEASE WITHOUT ESOPHAGITIS: ICD-10-CM

## 2019-05-22 DIAGNOSIS — R10.11 RUQ PAIN: ICD-10-CM

## 2019-05-22 DIAGNOSIS — R10.13 EPIGASTRIC PAIN: ICD-10-CM

## 2019-05-22 LAB
APPEARANCE FLUID: ABNORMAL
BILIRUBIN, POC: NEGATIVE
BLOOD URINE, POC: NEGATIVE
CLARITY, POC: ABNORMAL
COLOR, POC: ABNORMAL
GLUCOSE URINE, POC: NEGATIVE
KETONES, POC: NEGATIVE
LEUKOCYTE EST, POC: NEGATIVE
NITRITE, POC: NEGATIVE
PH, POC: 6
PROTEIN, POC: ABNORMAL
SPECIFIC GRAVITY, POC: 1.02
UROBILINOGEN, POC: 0.2

## 2019-05-22 PROCEDURE — 86706 HEP B SURFACE ANTIBODY: CPT

## 2019-05-22 PROCEDURE — 86803 HEPATITIS C AB TEST: CPT

## 2019-05-22 PROCEDURE — 84460 ALANINE AMINO (ALT) (SGPT): CPT

## 2019-05-22 PROCEDURE — 86704 HEP B CORE ANTIBODY TOTAL: CPT

## 2019-05-22 PROCEDURE — 84450 TRANSFERASE (AST) (SGOT): CPT

## 2019-05-22 PROCEDURE — 82977 ASSAY OF GGT: CPT

## 2019-05-22 PROCEDURE — 83883 ASSAY NEPHELOMETRY NOT SPEC: CPT

## 2019-05-22 PROCEDURE — 84443 ASSAY THYROID STIM HORMONE: CPT

## 2019-05-22 PROCEDURE — 80053 COMPREHEN METABOLIC PANEL: CPT

## 2019-05-22 PROCEDURE — 36415 COLL VENOUS BLD VENIPUNCTURE: CPT

## 2019-05-22 PROCEDURE — 99214 OFFICE O/P EST MOD 30 MIN: CPT | Performed by: NURSE PRACTITIONER

## 2019-05-22 PROCEDURE — 87086 URINE CULTURE/COLONY COUNT: CPT

## 2019-05-22 PROCEDURE — G8419 CALC BMI OUT NRM PARAM NOF/U: HCPCS | Performed by: NURSE PRACTITIONER

## 2019-05-22 PROCEDURE — 85025 COMPLETE CBC W/AUTO DIFF WBC: CPT

## 2019-05-22 PROCEDURE — 81002 URINALYSIS NONAUTO W/O SCOPE: CPT | Performed by: NURSE PRACTITIONER

## 2019-05-22 PROCEDURE — 87340 HEPATITIS B SURFACE AG IA: CPT

## 2019-05-22 PROCEDURE — 86140 C-REACTIVE PROTEIN: CPT

## 2019-05-22 PROCEDURE — G8427 DOCREV CUR MEDS BY ELIG CLIN: HCPCS | Performed by: NURSE PRACTITIONER

## 2019-05-22 PROCEDURE — 85610 PROTHROMBIN TIME: CPT

## 2019-05-22 PROCEDURE — 82150 ASSAY OF AMYLASE: CPT

## 2019-05-22 PROCEDURE — 96372 THER/PROPH/DIAG INJ SC/IM: CPT | Performed by: NURSE PRACTITIONER

## 2019-05-22 PROCEDURE — 84520 ASSAY OF UREA NITROGEN: CPT

## 2019-05-22 PROCEDURE — 83690 ASSAY OF LIPASE: CPT

## 2019-05-22 PROCEDURE — 87522 HEPATITIS C REVRS TRNSCRPJ: CPT

## 2019-05-22 PROCEDURE — 4004F PT TOBACCO SCREEN RCVD TLK: CPT | Performed by: NURSE PRACTITIONER

## 2019-05-22 RX ORDER — KETOROLAC TROMETHAMINE 30 MG/ML
60 INJECTION, SOLUTION INTRAMUSCULAR; INTRAVENOUS ONCE
Status: COMPLETED | OUTPATIENT
Start: 2019-05-22 | End: 2019-05-22

## 2019-05-22 RX ORDER — PANTOPRAZOLE SODIUM 40 MG/1
40 TABLET, DELAYED RELEASE ORAL
Qty: 90 TABLET | Refills: 0 | Status: SHIPPED | OUTPATIENT
Start: 2019-05-22 | End: 2019-08-30 | Stop reason: SDUPTHER

## 2019-05-22 RX ADMIN — KETOROLAC TROMETHAMINE 60 MG: 30 INJECTION, SOLUTION INTRAMUSCULAR; INTRAVENOUS at 14:06

## 2019-05-22 ASSESSMENT — ENCOUNTER SYMPTOMS
SHORTNESS OF BREATH: 0
BACK PAIN: 0
SORE THROAT: 0
DIARRHEA: 0
CHEST TIGHTNESS: 0
COUGH: 0
EYE PAIN: 0
COLOR CHANGE: 0
EYE REDNESS: 0
TROUBLE SWALLOWING: 0

## 2019-05-22 NOTE — PATIENT INSTRUCTIONS
Education and Discharge Instructions:  Keep a list of your medicines with you at all times. Always bring a up to date list or the medication bottles when going to the doctor or hospital.   Always follow the directions on your medications unless the doctor or nurse has instructed you otherwise. Keep all medications out of reach of children. Store medicines according to the directions on the label. Seek emergency medical attention if you think you have used too much medication. A overdose can be fatal.  Don't share your medicines with anyone. It may harm them. Discard any unused or out dated medications. If you have any questions, ask your provider or pharmacist for more information. Be sure to keep all appointments for provider visits or tests. Please continue all your medications that the Provider has prescribed for you unless other [facility]    1. Mental Health Info and Treatment Svnxswy-[phone]  2. Drug and Alcohol Detox Rehab treatment 24 hr aajxcnqv-[phone]  3. Stop Smoking Classes- [facility]-[phone]  4. [address] Program- prescription fywiozglav-[phone]  5. H[facility]-[phone]  6. Adult/Child Protection LYPCOHLE-[phone]    [facility]: Your online connection to your health information. Download the [employer] at [employer] or the [employer]	[place] ([place] [address] from the list of providers. [facility] Help Desk: 5-197-93-TQBTP    [employer]        We are committed to providing you with the best care possible. In order to help us achieve these goals please remember to bring all medications, herbal products, and over the counter supplements with you to each visit. If your provider has ordered testing for you, please be sure to follow up with our office if you have not received results within 7 days after the testing took place.      *If you receive a survey after visiting one of our offices, please take time to share your experience concerning your physician office visit. These surveys are confidential and no health information about you is shared.   We are eager to improve for you and we are counting on your feedback to help make that happen

## 2019-05-22 NOTE — PROGRESS NOTES
SUBJECTIVE:    Patient ID: Johanna Willis is a 37 y.o. male. Chief Complaint   Patient presents with    Abdominal Pain     pt states he had severe abdominal pain lasting most of the day yesterday. He states he was unable to eat until the pain subsided around 9pm    Dysphagia       Adela Portillo presents to the clinic today for new recurrent RUQ abdominal pain and dysphagia. Patient also reports nausea and vomiting associated with the abd pain that comes and goes intermittently. History of gallstone per patient and Hep C that has never been treated. Patient also reports worsening GERD symptoms with epigastric pain. Patient currently taking Protonix 20 mg PO daily without symptom relief. Patient reports severity of RUQ pain of being 7/10 when that pain comes. Patient denies being any severe pain at this time and was able to eat breakfast this am. Patient would like to see a specialist and have further workup for epigastric pain, worsening GERD, and fluctuating RUQ pain. Patient would also like to have further evaluation for his chronic hepatitis and possible treatment. Abdominal Pain   This is a recurrent problem. The current episode started 1 to 4 weeks ago. The onset quality is undetermined. The problem occurs intermittently. The problem has been waxing and waning. The pain is located in the generalized abdominal region, epigastric region and RUQ. The pain is at a severity of 4/10. The pain is mild. The quality of the pain is colicky and aching. The abdominal pain radiates to the epigastric region. Associated symptoms include anorexia, belching, nausea (comes and goes), vomiting and weight loss. Pertinent negatives include no arthralgias, constipation, diarrhea, dysuria, fever, headaches or melena. The pain is aggravated by certain positions, NSAIDs, drinking alcohol and palpation. The pain is relieved by nothing. He has tried proton pump inhibitors, antacids and acetaminophen for the symptoms.  The treatment provided mild relief. Prior diagnostic workup includes GI consult and ultrasound. His past medical history is significant for gallstones and GERD. Gastroesophageal Reflux   He complains of abdominal pain (RUQ), belching, choking, dysphagia, heartburn and nausea (comes and goes). He reports no chest pain, no coughing, no early satiety, no globus sensation, no hoarse voice, no sore throat, no stridor, no tooth decay, no water brash or no wheezing. This is a recurrent problem. The current episode started more than 1 year ago. The problem occurs frequently. The problem has been waxing and waning. The heartburn duration is less than a minute. The heartburn is located in the substernum. The heartburn is of moderate intensity. The heartburn wakes him from sleep. The heartburn limits his activity. The heartburn changes with position. The symptoms are aggravated by exertion, stress, medications, certain foods, bending and caffeine. Associated symptoms include fatigue and weight loss. Pertinent negatives include no anemia, melena, muscle weakness or orthopnea. Risk factors include smoking/tobacco exposure, NSAIDs, lack of exercise, caffeine use and ETOH use. He has tried head elevation, a PPI, smoking reduction and a diet change for the symptoms. The treatment provided mild relief. Past procedures do not include an abdominal ultrasound, an EGD, esophageal manometry, esophageal pH monitoring, H. pylori antibody titer or a UGI. Patient's medications, allergies, past medical, surgical, social and family histories were reviewed and updated as appropriate. Current Outpatient Medications on File Prior to Visit   Medication Sig Dispense Refill    ibuprofen (ADVIL;MOTRIN) 800 MG tablet Take 1 tablet by mouth 3 times daily (with meals) 90 tablet 3     No current facility-administered medications on file prior to visit. Review of Systems   Constitutional: Positive for fatigue and weight loss.  Negative for activity change, appetite change, chills and fever. HENT: Negative for congestion, ear pain, hoarse voice, nosebleeds, sore throat and trouble swallowing. Eyes: Negative for pain and redness. Respiratory: Positive for choking. Negative for cough, chest tightness, shortness of breath and wheezing. Cardiovascular: Negative for chest pain, palpitations and leg swelling. Gastrointestinal: Positive for abdominal pain (RUQ), anorexia, dysphagia, heartburn, nausea (comes and goes) and vomiting. Negative for constipation, diarrhea and melena. Heart burn fluctuating, continues to take Protonix 20 mg PO daily. Genitourinary: Negative for difficulty urinating, dysuria and flank pain. Musculoskeletal: Negative for arthralgias, back pain, gait problem and muscle weakness. Skin: Negative for color change, pallor, rash and wound. Allergic/Immunologic: Negative for environmental allergies and food allergies. Neurological: Negative for dizziness, numbness and headaches. Hematological: Negative for adenopathy. Does not bruise/bleed easily. Psychiatric/Behavioral: Negative for agitation and sleep disturbance. The patient is not nervous/anxious. OBJECTIVE:  BP (!) 128/96   Pulse 90   Resp 18   Ht 5' 10\" (1.778 m)   Wt 115 lb 12.8 oz (52.5 kg)   SpO2 98%   BMI 16.62 kg/m²       Physical Exam   Constitutional: He is oriented to person, place, and time. Vital signs are normal. He appears well-developed and well-nourished. He is active. Non-toxic appearance. He does not have a sickly appearance. He does not appear ill. No distress. HENT:   Head: Normocephalic and atraumatic. Right Ear: Hearing, tympanic membrane, external ear and ear canal normal.   Left Ear: Hearing, tympanic membrane, external ear and ear canal normal.   Nose: Nose normal. No mucosal edema, rhinorrhea or nose lacerations. Right sinus exhibits no maxillary sinus tenderness and no frontal sinus tenderness.  Left sinus exhibits no maxillary sinus tenderness and no frontal sinus tenderness. Mouth/Throat: Uvula is midline, oropharynx is clear and moist and mucous membranes are normal. Normal dentition. No dental caries. No oropharyngeal exudate. No tonsillar exudate. Eyes: Pupils are equal, round, and reactive to light. Conjunctivae, EOM and lids are normal. Right eye exhibits no discharge. Left eye exhibits no discharge. No scleral icterus. Neck: Trachea normal, normal range of motion, full passive range of motion without pain and phonation normal. Neck supple. Normal carotid pulses, no hepatojugular reflux and no JVD present. No tracheal tenderness present. Carotid bruit is not present. No tracheal deviation present. No thyroid mass and no thyromegaly present. Cardiovascular: Normal rate, regular rhythm, S1 normal, S2 normal, normal heart sounds, intact distal pulses and normal pulses. Exam reveals no gallop, no distant heart sounds and no friction rub. No murmur heard. Pulmonary/Chest: Effort normal and breath sounds normal. No accessory muscle usage or stridor. No apnea, no tachypnea and no bradypnea. No respiratory distress. He has no decreased breath sounds. He has no wheezes. He has no rhonchi. He has no rales. He exhibits no tenderness. Abdominal: Soft. Normal appearance and bowel sounds are normal. He exhibits no distension and no mass. There is no hepatosplenomegaly, splenomegaly or hepatomegaly. There is tenderness in the right upper quadrant and epigastric area. There is no rebound and no guarding. No hernia. Musculoskeletal: Normal range of motion. He exhibits no edema, tenderness or deformity. Lymphadenopathy:     He has no cervical adenopathy. He has no axillary adenopathy. Neurological: He is alert and oriented to person, place, and time. He has normal strength. He is not disoriented. He displays normal reflexes. No cranial nerve deficit or sensory deficit. He exhibits normal muscle tone.  Coordination normal. GCS eye subscore is 4. GCS verbal subscore is 5. GCS motor subscore is 6. Skin: Skin is warm, dry and intact. Capillary refill takes less than 2 seconds. No bruising and no rash noted. He is not diaphoretic. No erythema. No pallor. Psychiatric: He has a normal mood and affect. His speech is normal and behavior is normal. Judgment and thought content normal. Cognition and memory are normal.   Nursing note and vitals reviewed.     Results in Past 30 Days  Result Component Current Result Ref Range Previous Result Ref Range   Alb 4.8 (5/22/2019) 3.4 - 4.8 g/dL Not in Time Range    Albumin/Globulin Ratio 1.9 (5/22/2019) 0.8 - 2.0 Not in Time Range    Alkaline Phosphatase 66 (5/22/2019) 25 - 100 U/L Not in Time Range    ALT 45 (H) (5/22/2019) 4 - 36 U/L Not in Time Range    AST 28 (5/22/2019) 8 - 33 U/L Not in Time Range    BUN 19 (5/22/2019) 6 - 20 mg/dL Not in Time Range    Calcium 9.8 (5/22/2019) 8.5 - 10.5 mg/dL Not in Time Range    Chloride 97 (L) (5/22/2019) 98 - 107 mmol/L Not in Time Range    CO2 27 (5/22/2019) 20 - 30 mmol/L Not in Time Range    CREATININE 1.0 (5/22/2019) 0.4 - 1.2 mg/dL Not in Time Range    GFR  >59 (5/22/2019) >59 Not in Time Range    GFR Non- >59 (5/22/2019) >59 Not in Time Range    Globulin 2.5 (5/22/2019) g/dL Not in Time Range    Glucose 83 (5/22/2019) 74 - 106 mg/dL Not in Time Range    Potassium 4.3 (5/22/2019) 3.4 - 5.1 mmol/L Not in Time Range    Sodium 137 (5/22/2019) 136 - 145 mmol/L Not in Time Range    Total Bilirubin 0.7 (5/22/2019) 0.3 - 1.2 mg/dL Not in Time Range    Total Protein 7.3 (5/22/2019) 6.4 - 8.3 g/dL Not in Time Range      LDL Calculated (mg/dL)   Date Value   04/08/2019 107       Lab Results   Component Value Date    WBC 10.0 05/22/2019    NEUTROABS 6.2 05/22/2019    HGB 17.1 05/22/2019    HCT 52.3 05/22/2019    MCV 92.2 05/22/2019     05/22/2019     ASSESSMENT/PLAN:     Linda Weber was seen today for abdominal pain and dysphagia. Diagnoses and all orders for this visit:    RUQ pain  -     CT ABDOMEN PELVIS WO CONTRAST Additional Contrast? Radiologist Recommendation; Future  -     Hepatitis C RNA, quantitative, PCR; Future  -     CBC Auto Differential; Future  -     Comprehensive Metabolic Panel; Future  -     Hepatitis C Antibody; Future  -     C-Reactive Protein; Future  -     Lipase; Future  -     AMYLASE; Future  -     URINE CULTURE  -     POCT Urinalysis no Micro  -     ketorolac (TORADOL) injection 60 mg    Gastroesophageal reflux disease without esophagitis  -     pantoprazole (PROTONIX) 40 MG tablet; Take 1 tablet by mouth every morning (before breakfast)  -     Mynor Hugo MD, [facility]  -     CT ABDOMEN PELVIS WO CONTRAST Additional Contrast? Radiologist Recommendation; Future  -     URINE CULTURE  -     POCT Urinalysis no Micro  -     ketorolac (TORADOL) injection 60 mg    Epigastric pain  -     CT ABDOMEN PELVIS WO CONTRAST Additional Contrast? Radiologist Recommendation; Future  -     CBC Auto Differential; Future  -     TSH with Reflex; Future  -     Comprehensive Metabolic Panel; Future  -     C-Reactive Protein; Future  -     Lipase; Future  -     AMYLASE; Future  -     URINE CULTURE  -     POCT Urinalysis no Micro  -     ketorolac (TORADOL) injection 60 mg    Chronic hepatitis C without hepatic coma (HCC)  -     CT ABDOMEN PELVIS WO CONTRAST Additional Contrast? Radiologist Recommendation; Future  -     Hepatitis C RNA, quantitative, PCR; Future  -     Hepatitis C Antibody; Future  -     PROTIME-INR; Future  -     Liver Fibrosis, Chronic Viral Hepatitis; Future  -     HEPATITIS B SURFACE ANTIGEN; Future  -     HEPATITIS B SURFACE ANTIBODY; Future  -     HEPATITIS B CORE ANTIBODY, TOTAL;  Future  -     Hep C AB RLFX HCV PCR-A; Future      PATIENT COUNSELING     Counseling was provided today regarding the following topics: Healthy eating habits, Regular exercise, substance abuse and healthy sleep habits. Discussed use, benefit, and side effects of prescribed medications. Barriers to medication compliance addressed. All patient questions answered. Patient voiced understanding. Medications Discontinued During This Encounter   Medication Reason    pantoprazole (PROTONIX) 20 MG tablet REORDER       Return in about 1 week (around 5/29/2019). SEAN Randhawa - CNP     Education was provided for discussed topics. Call the office with worsening complaints or any side effects to any medications. If an emergency please call 911.     Shawn Candelaria MSN, APRN, FNP-BC, NP-C

## 2019-05-23 LAB
A/G RATIO: 1.9 (ref 0.8–2)
ALBUMIN SERPL-MCNC: 4.8 G/DL (ref 3.4–4.8)
ALP BLD-CCNC: 66 U/L (ref 25–100)
ALT SERPL-CCNC: 45 U/L (ref 4–36)
AMYLASE: 67 U/L (ref 20–104)
ANION GAP SERPL CALCULATED.3IONS-SCNC: 13 MMOL/L (ref 3–16)
AST SERPL-CCNC: 28 U/L (ref 8–33)
BASOPHILS ABSOLUTE: 0.1 K/UL (ref 0–0.1)
BASOPHILS RELATIVE PERCENT: 0.8 %
BILIRUB SERPL-MCNC: 0.7 MG/DL (ref 0.3–1.2)
BUN BLDV-MCNC: 19 MG/DL (ref 6–20)
CALCIUM SERPL-MCNC: 9.8 MG/DL (ref 8.5–10.5)
CHLORIDE BLD-SCNC: 97 MMOL/L (ref 98–107)
CO2: 27 MMOL/L (ref 20–30)
CREAT SERPL-MCNC: 1 MG/DL (ref 0.4–1.2)
EOSINOPHILS ABSOLUTE: 0.1 K/UL (ref 0–0.4)
EOSINOPHILS RELATIVE PERCENT: 1 %
GFR AFRICAN AMERICAN: >59
GFR NON-AFRICAN AMERICAN: >59
GLOBULIN: 2.5 G/DL
GLUCOSE BLD-MCNC: 83 MG/DL (ref 74–106)
HCT VFR BLD CALC: 52.3 % (ref 40–54)
HEMOGLOBIN: 17.1 G/DL (ref 13–18)
IMMATURE GRANULOCYTES #: 0.1 K/UL
IMMATURE GRANULOCYTES %: 0.6 % (ref 0–5)
INR BLD: 0.9 (ref 0.94–1.06)
LIPASE: 34 U/L (ref 5.6–51.3)
LYMPHOCYTES ABSOLUTE: 3.1 K/UL (ref 1.5–4)
LYMPHOCYTES RELATIVE PERCENT: 30.7 %
MCH RBC QN AUTO: 30.2 PG (ref 27–32)
MCHC RBC AUTO-ENTMCNC: 32.7 G/DL (ref 31–35)
MCV RBC AUTO: 92.2 FL (ref 80–100)
MONOCYTES ABSOLUTE: 0.6 K/UL (ref 0.2–0.8)
MONOCYTES RELATIVE PERCENT: 5.6 %
NEUTROPHILS ABSOLUTE: 6.2 K/UL (ref 2–7.5)
NEUTROPHILS RELATIVE PERCENT: 61.3 %
PDW BLD-RTO: 11.9 % (ref 11–16)
PLATELET # BLD: 351 K/UL (ref 150–400)
PMV BLD AUTO: 10.1 FL (ref 6–10)
POTASSIUM SERPL-SCNC: 4.3 MMOL/L (ref 3.4–5.1)
PROTHROMBIN TIME: 9.4 SEC (ref 9.4–10.6)
RBC # BLD: 5.67 M/UL (ref 4.5–6)
SODIUM BLD-SCNC: 137 MMOL/L (ref 136–145)
TOTAL PROTEIN: 7.3 G/DL (ref 6.4–8.3)
TSH REFLEX: 0.81 UIU/ML (ref 0.35–5.5)
WBC # BLD: 10 K/UL (ref 4–11)

## 2019-05-24 LAB — C-REACTIVE PROTEIN: <0.3 MG/L (ref 0–5.1)

## 2019-05-25 LAB
HBV SURFACE AB TITR SER: 17.28 MIU/ML
HCV QNT BY NAAT IU/ML: ABNORMAL IU/ML
HCV QNT BY NAAT LOG IU/ML: 6.73 LOG IU/ML
HEPATITIS B SURFACE ANTIGEN INTERPRETATION: NORMAL
HEPATITIS C ANTIBODY INTERPRETATION: REACTIVE
INTERPRETATION: DETECTED

## 2019-05-26 LAB
HEPATITIS B CORE TOTAL ANTIBODY: NEGATIVE
HEPATITIS C VIRUS AB BY CIA INDEX: >11 IV
HEPATITIS C VIRUS AB BY CIA INTERPRETATION: ABNORMAL
URINE CULTURE, ROUTINE: NORMAL

## 2019-05-29 VITALS
SYSTOLIC BLOOD PRESSURE: 128 MMHG | BODY MASS INDEX: 16.58 KG/M2 | HEART RATE: 90 BPM | WEIGHT: 115.8 LBS | OXYGEN SATURATION: 98 % | RESPIRATION RATE: 18 BRPM | HEIGHT: 70 IN | DIASTOLIC BLOOD PRESSURE: 96 MMHG

## 2019-05-29 LAB — MISCELLANEOUS LAB TEST ORDER: ABNORMAL

## 2019-05-29 ASSESSMENT — ENCOUNTER SYMPTOMS
WHEEZING: 0
GLOBUS SENSATION: 0
STRIDOR: 0
ABDOMINAL PAIN: 1
WATER BRASH: 0
CHOKING: 1
NAUSEA: 1
BELCHING: 1
VOMITING: 1
HOARSE VOICE: 0
HEARTBURN: 1
CONSTIPATION: 0

## 2019-06-03 DIAGNOSIS — M25.511 ARTHRALGIA OF RIGHT SHOULDER REGION: Primary | ICD-10-CM

## 2019-06-05 ENCOUNTER — PREP FOR PROCEDURE (OUTPATIENT)
Dept: SURGERY | Age: 44
End: 2019-06-05

## 2019-06-05 ENCOUNTER — OFFICE VISIT (OUTPATIENT)
Dept: SURGERY | Age: 44
End: 2019-06-05
Payer: COMMERCIAL

## 2019-06-05 VITALS
SYSTOLIC BLOOD PRESSURE: 130 MMHG | HEART RATE: 115 BPM | RESPIRATION RATE: 16 BRPM | WEIGHT: 115.3 LBS | TEMPERATURE: 98.6 F | HEIGHT: 70 IN | DIASTOLIC BLOOD PRESSURE: 88 MMHG | BODY MASS INDEX: 16.51 KG/M2

## 2019-06-05 DIAGNOSIS — R13.10 DYSPHAGIA, UNSPECIFIED TYPE: Primary | ICD-10-CM

## 2019-06-05 DIAGNOSIS — B19.20 HEPATITIS C VIRUS INFECTION WITHOUT HEPATIC COMA, UNSPECIFIED CHRONICITY: ICD-10-CM

## 2019-06-05 PROCEDURE — G8427 DOCREV CUR MEDS BY ELIG CLIN: HCPCS | Performed by: SURGERY

## 2019-06-05 PROCEDURE — 99204 OFFICE O/P NEW MOD 45 MIN: CPT

## 2019-06-05 PROCEDURE — G8419 CALC BMI OUT NRM PARAM NOF/U: HCPCS | Performed by: SURGERY

## 2019-06-05 PROCEDURE — 99214 OFFICE O/P EST MOD 30 MIN: CPT

## 2019-06-05 PROCEDURE — 99244 OFF/OP CNSLTJ NEW/EST MOD 40: CPT | Performed by: SURGERY

## 2019-06-05 RX ORDER — SODIUM CHLORIDE, SODIUM LACTATE, POTASSIUM CHLORIDE, CALCIUM CHLORIDE 600; 310; 30; 20 MG/100ML; MG/100ML; MG/100ML; MG/100ML
INJECTION, SOLUTION INTRAVENOUS CONTINUOUS
Status: CANCELLED | OUTPATIENT
Start: 2019-06-05

## 2019-06-05 RX ORDER — SODIUM CHLORIDE 0.9 % (FLUSH) 0.9 %
10 SYRINGE (ML) INJECTION EVERY 12 HOURS SCHEDULED
Status: CANCELLED | OUTPATIENT
Start: 2019-06-05

## 2019-06-05 RX ORDER — SODIUM CHLORIDE 0.9 % (FLUSH) 0.9 %
10 SYRINGE (ML) INJECTION PRN
Status: CANCELLED | OUTPATIENT
Start: 2019-06-05

## 2019-06-05 NOTE — PROGRESS NOTES
negative  HEMATOLOGIC/LYMPHATIC:  negative  ALLERGIC/IMMUNOLOGIC:  negative  ENDOCRINE:  negative  MUSCULOSKELETAL:  negative  NEUROLOGICAL:  negative  BEHAVIOR/PSYCH:  negative    PHYSICAL EXAM:    VITALS:  /88 (Site: Left Upper Arm, Position: Sitting, Cuff Size: Medium Adult)   Pulse 115   Temp 98.6 °F (37 °C) (Oral)   Resp 16   Ht 5' 10\" (1.778 m)   Wt 115 lb 4.8 oz (52.3 kg)   BMI 16.54 kg/m²   CONSTITUTIONAL:  awake, alert, cooperative, no apparent distress, and appears stated age  EYES:  Lids and lashes normal, pupils equal, round and reactive to light, extra ocular muscles intact, sclera clear, conjunctiva normal  ENT:  Normocephalic, without obvious abnormality, atraumatic, sinuses nontender on palpation, external ears without lesions, oral pharynx with moist mucus membranes, tonsils without erythema or exudates, gums normal and good dentition. NECK:  Supple, symmetrical, trachea midline, no adenopathy, thyroid symmetric, not enlarged and no tenderness, skin normal  HEMATOLOGIC/LYMPHATICS:  no cervical lymphadenopathy and no supraclavicular lymphadenopathy  BACK:  Symmetric, no curvature, spinous processes are non-tender on palpation, paraspinous muscles are non-tender on palpation, no costal vertebral tenderness  LUNGS:  No increased work of breathing, good air exchange, clear to auscultation bilaterally, no crackles or wheezing  CARDIOVASCULAR:  Normal apical impulse, regular rate and rhythm, normal S1 and S2, no S3 or S4, and no murmur noted  ABDOMEN:  No scars, normal bowel sounds, soft, non-distended, non-tender, no masses palpated, no hepatosplenomegally  CHEST/BREASTS:  Pectus excavatum  MUSCULOSKELETAL:  There is no redness, warmth, or swelling of the joints. Full range of motion noted. Motor strength is 5 out of 5 all extremities bilaterally. Tone is normal.  NEUROLOGIC:  Awake, alert, oriented to name, place and time. Cranial nerves II-XII are grossly intact.   Motor is 5 out of 5 bilaterally. Cerebellar finger to nose, heel to shin intact. Sensory is intact. Babinski down going, Romberg negative, and gait is normal.  SKIN:  no bruising or bleeding, normal skin color, texture, turgor and no redness, warmth, or swelling  DATA:    Old records have been requested    Malampati 2    ASA 2    IMPRESSION/RECOMMENDATIONS:  Dysphagia  H/o Hep c    Plan:I have reviewed the EGD pamphlet with the patient and discussed the possible risks and complications of the procedure, including the remote chance of perforation. He appears to understand and gives informed consent. I am scheduling the patient for an EGD on 06/10/19 at [facility] under local anesthesia with sedation MAC. Consult faxed to SEAN Ha CNP; Thank you for the opportunity to participate in this patient's care.   Electronically signed by Jennifer Corbett MD on 6/5/2019 at 1:40 PM

## 2019-06-05 NOTE — PROGRESS NOTES
Pt is coming in today because he has been experiencing acid reflux for approximately 2-3 years. Pt has been experiencing frequent RUQ pain, denies nausea or vomiting, denies constipation or diarrhea, denies caliber or color change in stools, denies pain with bowel movement, denies blood in stools, denies any fevers. Pt has experienced difficulty swallowing for about 5 months.

## 2019-06-10 ENCOUNTER — HOSPITAL ENCOUNTER (OUTPATIENT)
Facility: HOSPITAL | Age: 44
Setting detail: OUTPATIENT SURGERY
Discharge: HOME | End: 2019-06-10
Attending: SURGERY | Admitting: SURGERY
Payer: COMMERCIAL

## 2019-06-10 VITALS
HEIGHT: 70 IN | RESPIRATION RATE: 16 BRPM | DIASTOLIC BLOOD PRESSURE: 81 MMHG | BODY MASS INDEX: 16.46 KG/M2 | SYSTOLIC BLOOD PRESSURE: 110 MMHG | OXYGEN SATURATION: 98 % | WEIGHT: 115 LBS | HEART RATE: 92 BPM | TEMPERATURE: 97.7 F

## 2019-06-10 LAB
GLUCOSE BLD-MCNC: 97 MG/DL (ref 74–106)
PERFORMED ON: NORMAL

## 2019-06-10 PROCEDURE — 6360000002 HC RX W HCPCS: Performed by: SURGERY

## 2019-06-10 PROCEDURE — 7100000010 HC PHASE II RECOVERY - FIRST 15 MIN: Performed by: SURGERY

## 2019-06-10 PROCEDURE — 3609012400 HC EGD TRANSORAL BIOPSY SINGLE/MULTIPLE: Performed by: SURGERY

## 2019-06-10 PROCEDURE — 7100000011 HC PHASE II RECOVERY - ADDTL 15 MIN: Performed by: SURGERY

## 2019-06-10 PROCEDURE — 99152 MOD SED SAME PHYS/QHP 5/>YRS: CPT | Performed by: SURGERY

## 2019-06-10 PROCEDURE — 2709999900 HC NON-CHARGEABLE SUPPLY: Performed by: SURGERY

## 2019-06-10 PROCEDURE — 2580000003 HC RX 258: Performed by: SURGERY

## 2019-06-10 PROCEDURE — 43239 EGD BIOPSY SINGLE/MULTIPLE: CPT | Performed by: SURGERY

## 2019-06-10 PROCEDURE — 6370000000 HC RX 637 (ALT 250 FOR IP): Performed by: SURGERY

## 2019-06-10 RX ORDER — LIDOCAINE HYDROCHLORIDE 40 MG/ML
SOLUTION TOPICAL PRN
Status: DISCONTINUED | OUTPATIENT
Start: 2019-06-10 | End: 2019-06-10 | Stop reason: ALTCHOICE

## 2019-06-10 RX ORDER — SODIUM CHLORIDE, SODIUM LACTATE, POTASSIUM CHLORIDE, CALCIUM CHLORIDE 600; 310; 30; 20 MG/100ML; MG/100ML; MG/100ML; MG/100ML
INJECTION, SOLUTION INTRAVENOUS CONTINUOUS
Status: DISCONTINUED | OUTPATIENT
Start: 2019-06-10 | End: 2019-06-10 | Stop reason: HOSPADM

## 2019-06-10 RX ORDER — SODIUM CHLORIDE 0.9 % (FLUSH) 0.9 %
10 SYRINGE (ML) INJECTION PRN
Status: DISCONTINUED | OUTPATIENT
Start: 2019-06-10 | End: 2019-06-10 | Stop reason: HOSPADM

## 2019-06-10 RX ORDER — SODIUM CHLORIDE 0.9 % (FLUSH) 0.9 %
10 SYRINGE (ML) INJECTION EVERY 12 HOURS SCHEDULED
Status: DISCONTINUED | OUTPATIENT
Start: 2019-06-10 | End: 2019-06-10 | Stop reason: HOSPADM

## 2019-06-10 RX ORDER — MEPERIDINE HYDROCHLORIDE 50 MG/ML
INJECTION INTRAMUSCULAR; INTRAVENOUS; SUBCUTANEOUS PRN
Status: DISCONTINUED | OUTPATIENT
Start: 2019-06-10 | End: 2019-06-10 | Stop reason: ALTCHOICE

## 2019-06-10 RX ORDER — MIDAZOLAM HYDROCHLORIDE 1 MG/ML
INJECTION INTRAMUSCULAR; INTRAVENOUS PRN
Status: DISCONTINUED | OUTPATIENT
Start: 2019-06-10 | End: 2019-06-10 | Stop reason: ALTCHOICE

## 2019-06-10 RX ADMIN — Medication 10 ML: at 09:56

## 2019-06-10 RX ADMIN — SODIUM CHLORIDE, POTASSIUM CHLORIDE, SODIUM LACTATE AND CALCIUM CHLORIDE: 600; 310; 30; 20 INJECTION, SOLUTION INTRAVENOUS at 09:56

## 2019-06-10 NOTE — PROGRESS NOTES
DISCHARGE INSTRUCTIONS  You have had an EGD [EsophagoGastroduoDuodenoscopy]    1. You received Medications for Sedation. Therefore:  · You may experience light headedness for the next several hours. Plan on quiet relaxation for the remainder of the day. · You should not engage in activity requiring memory or coordination for the remainder of the day. In particular, you should not drive, operate machinery, conduct business affairs, or sign contracts. · You should not consume any alcoholic beverages today or tonight. · You should not take sleeping medication unless one has been specifically    prescribed for you by a physician. 2. Eat a light first meal and then progress back to your regular diet. 3. You have had local anesthetic or spray to your throat. If you had spray, then you should not eat or drink for 30 minutes after discharge. 4. If you have a sore throat, then you may use lozenges or salt water gargles. 5. Regarding Aspirin and NSAIDs (Non Steroidal Anti-Inflammatory) Medications, You:  [] may resume taking aspirin and or NSAIDs    [] should not take take these medications for two weeks, but may then resume them. [x] should not take aspirin or NSAIDs. Please see the Medication Reconciliation Record regarding your other medications:   Sapna Morales   Henry Medication Instructions IPE:[id number]    Printed on:06/10/19 1052   Medication Information                      ibuprofen (ADVIL;MOTRIN) 800 MG tablet  Take 1 tablet by mouth 3 times daily (with meals)             pantoprazole (PROTONIX) 40 MG tablet  Take 1 tablet by mouth every morning (before breakfast)                 6. Please call the office, If you do not hear from us within 7 days regarding pending biopsy or lab results. 7. I requested that you receive information regarding the following:  [x] Reflux  [x] Hiatal hernia  [] Garcia's [] H Pylori  [x] Ulcers and Gastritis  [] Instructions re Aspirin     8.  Follow up

## 2019-06-10 NOTE — PROGRESS NOTES
Pt tolerating PO well without N/V. IV DC'ed with tip intact and pressure applied. No complications at site. DC instructions with follow up given without questions. Condition stable. Belongings with pt. Pt left endoscopy with Rebeka Tina. Pt ambulatory but unsteady, so RN transported pt to car per . DC instructions in hand.

## 2019-06-10 NOTE — PROGRESS NOTES
Pt arrived ambulatory to endoscopy. Deep Chua to drive home. Consent verified without questions. LR at 75cc/hr per gravity. Prep completed and effective per pt. DC instructions completed with pt. No questions at this time. Denies need to void at this time. Will continue to monitor closely. FSG 97 at this time.

## 2019-06-10 NOTE — OP NOTE
[facility]                       Esophagogastroduodenoscopy Note    Patient:   Taylor Quinn    YOB: 1975    Facility:   [facility] [Outpatient]   Referring/PCP: SEAN Black CNP    Procedure:   Esophagogastroduodenoscopy with biopsy  Date:     6/10/2019   Endoscopist:  Felecia Hough     Preoperative Diagnosis:   New-onset odynophagia    Postoperative Diagnosis: -esophagitis, grade 2   -hiatal hernia, 3 cm in size  -antral gastritis    Anesthesia: Demerol 75 mg IV and Versed 6 mg IV    Estimated blood loss: None    Complications: None    Description of Procedure:  Informed consent was obtained from the patient after explanation of the procedure including indications, description of the procedure,  benefits and possible risks and complications of the procedure, and alternatives. Questions were answered. The patient's history was reviewed and a directed physical examination was performed prior to the procedure. Patient was monitored throughout the procedure with pulse oximetry and periodic assessment of vital signs. Patient was sedated as noted above. With the patient in the left lateral decubitus position, the Olympus videoendoscope was placed in the patient's mouth and under direct visualization passed into the esophagus. The scope was ultimately passed to the entire esophagus, the entire stomach, the second portion of the duodenum. Cold biopsy forceps(multiple) biopsies were performed at gastric antrum. Visualization was performed during both introduction and withdrawal of the endoscope and retroflexed view of the proximal stomach was obtained. Findings[de-identified]   Esophagus: Hiatal hernia with mod reflux esophagitis; normal esophageal contractions. The findings do not support a diagnosis of Garcia's Esophagus. Stomach: abnormal: antral gastritis  Duodenum: normal    Recommendations: -Continue acid suppression. , -Await pathology. , -Follow up with me.     Consuelo Parmar MD 06/10/19

## 2019-06-10 NOTE — PROGRESS NOTES
Pt to room via stretcher, NAD, No c/o noted. HOB elevated 30 degrees, Left side lying position. BS active in all 4 quads. Lung sounds CTA and no distress noted. Rails up x2, stretcher locked. Assessment completed. Awakens to speech. Abd soft /NT/ND:+flatus, no evidence of pain.

## 2019-06-13 ENCOUNTER — OFFICE VISIT (OUTPATIENT)
Dept: ORTHOPEDIC SURGERY | Facility: CLINIC | Age: 44
End: 2019-06-13

## 2019-06-13 VITALS — RESPIRATION RATE: 18 BRPM | WEIGHT: 119 LBS | HEIGHT: 70 IN | BODY MASS INDEX: 17.04 KG/M2

## 2019-06-13 DIAGNOSIS — M75.41 ROTATOR CUFF IMPINGEMENT SYNDROME OF RIGHT SHOULDER: ICD-10-CM

## 2019-06-13 DIAGNOSIS — IMO0002 BURSITIS/TENDONITIS, SHOULDER: Primary | ICD-10-CM

## 2019-06-13 PROCEDURE — 99203 OFFICE O/P NEW LOW 30 MIN: CPT | Performed by: ORTHOPAEDIC SURGERY

## 2019-06-13 PROCEDURE — 20610 DRAIN/INJ JOINT/BURSA W/O US: CPT | Performed by: ORTHOPAEDIC SURGERY

## 2019-06-13 RX ORDER — TRIAMCINOLONE ACETONIDE 40 MG/ML
40 INJECTION, SUSPENSION INTRA-ARTICULAR; INTRAMUSCULAR
Status: COMPLETED | OUTPATIENT
Start: 2019-06-13 | End: 2019-06-13

## 2019-06-13 RX ORDER — IBUPROFEN 800 MG/1
TABLET ORAL EVERY 6 HOURS PRN
COMMUNITY
Start: 2019-06-12 | End: 2021-08-04 | Stop reason: HOSPADM

## 2019-06-13 RX ORDER — LIDOCAINE HYDROCHLORIDE 10 MG/ML
2 INJECTION, SOLUTION INFILTRATION; PERINEURAL
Status: COMPLETED | OUTPATIENT
Start: 2019-06-13 | End: 2019-06-13

## 2019-06-13 RX ORDER — PANTOPRAZOLE SODIUM 40 MG/1
40 TABLET, DELAYED RELEASE ORAL DAILY
COMMUNITY
Start: 2019-05-22

## 2019-06-13 RX ADMIN — LIDOCAINE HYDROCHLORIDE 2 ML: 10 INJECTION, SOLUTION INFILTRATION; PERINEURAL at 13:51

## 2019-06-13 RX ADMIN — TRIAMCINOLONE ACETONIDE 40 MG: 40 INJECTION, SUSPENSION INTRA-ARTICULAR; INTRAMUSCULAR at 13:51

## 2019-06-26 ENCOUNTER — OFFICE VISIT (OUTPATIENT)
Dept: SURGERY | Age: 44
End: 2019-06-26
Payer: COMMERCIAL

## 2019-06-26 VITALS
TEMPERATURE: 98.9 F | HEIGHT: 70 IN | WEIGHT: 122.4 LBS | DIASTOLIC BLOOD PRESSURE: 83 MMHG | BODY MASS INDEX: 17.52 KG/M2 | RESPIRATION RATE: 16 BRPM | SYSTOLIC BLOOD PRESSURE: 117 MMHG | HEART RATE: 70 BPM

## 2019-06-26 DIAGNOSIS — K21.00 GASTROESOPHAGEAL REFLUX DISEASE WITH ESOPHAGITIS: Primary | ICD-10-CM

## 2019-06-26 PROCEDURE — 4004F PT TOBACCO SCREEN RCVD TLK: CPT | Performed by: SURGERY

## 2019-06-26 PROCEDURE — G8427 DOCREV CUR MEDS BY ELIG CLIN: HCPCS | Performed by: SURGERY

## 2019-06-26 PROCEDURE — G8419 CALC BMI OUT NRM PARAM NOF/U: HCPCS | Performed by: SURGERY

## 2019-06-26 PROCEDURE — 99212 OFFICE O/P EST SF 10 MIN: CPT | Performed by: SURGERY

## 2019-06-26 PROCEDURE — 99212 OFFICE O/P EST SF 10 MIN: CPT

## 2019-06-26 NOTE — PROGRESS NOTES
Pt is coming in today to follow up after having an egd with Dr. Karen Ro. Pt states nothing has changed with his medications or history since he was here last and he had no complications after the procedure.

## 2019-06-26 NOTE — PROGRESS NOTES
Vitals:    06/26/19 1145   BP: 117/83   Pulse: 70   Resp: 16   Temp: 98.9 °F (37.2 °C)     Felecia Eaton comes in post EGD w biopsy. Path report shows benign gastritis without Helicobacter organisms. He has had no sequelae from the procedure and is back to a normal diet. The procedure findings were of hiatal hernia and reflux esophagitis. I am advising using a PPI daily. In addition we have discussed the reflux esophagitis pamphlet and have reviewed the dietary, activities, and medication regimen and he  appears to understand and agrees to comply. He will followup with me on as needed basis and will see PCP for any continued medical problems.     Electronically signed by Geovany Cintron MD on 6/26/2019 at 12:42 PM

## 2019-08-30 DIAGNOSIS — K21.9 GASTROESOPHAGEAL REFLUX DISEASE WITHOUT ESOPHAGITIS: ICD-10-CM

## 2019-09-03 RX ORDER — PANTOPRAZOLE SODIUM 40 MG/1
TABLET, DELAYED RELEASE ORAL
Qty: 90 TABLET | Refills: 0 | Status: SHIPPED | OUTPATIENT
Start: 2019-09-03 | End: 2020-01-02 | Stop reason: SDUPTHER

## 2019-09-25 ENCOUNTER — OFFICE VISIT (OUTPATIENT)
Dept: FAMILY MEDICINE CLINIC | Age: 44
End: 2019-09-25
Payer: COMMERCIAL

## 2019-09-25 VITALS
HEART RATE: 100 BPM | WEIGHT: 123.6 LBS | BODY MASS INDEX: 17.69 KG/M2 | DIASTOLIC BLOOD PRESSURE: 81 MMHG | RESPIRATION RATE: 18 BRPM | SYSTOLIC BLOOD PRESSURE: 124 MMHG | OXYGEN SATURATION: 96 % | HEIGHT: 70 IN

## 2019-09-25 DIAGNOSIS — B17.10 ACUTE HEPATITIS C WITHOUT HEPATIC COMA: Primary | ICD-10-CM

## 2019-09-25 PROCEDURE — G8419 CALC BMI OUT NRM PARAM NOF/U: HCPCS | Performed by: NURSE PRACTITIONER

## 2019-09-25 PROCEDURE — G8427 DOCREV CUR MEDS BY ELIG CLIN: HCPCS | Performed by: NURSE PRACTITIONER

## 2019-09-25 PROCEDURE — 4004F PT TOBACCO SCREEN RCVD TLK: CPT | Performed by: NURSE PRACTITIONER

## 2019-09-25 PROCEDURE — 99213 OFFICE O/P EST LOW 20 MIN: CPT | Performed by: NURSE PRACTITIONER

## 2019-09-25 ASSESSMENT — ENCOUNTER SYMPTOMS
CHEST TIGHTNESS: 0
EYE REDNESS: 0
DIARRHEA: 0
COLOR CHANGE: 0
BACK PAIN: 0
VOMITING: 0
SHORTNESS OF BREATH: 0
COUGH: 0
TROUBLE SWALLOWING: 0
EYE PAIN: 0
NAUSEA: 0
SORE THROAT: 0
ABDOMINAL PAIN: 0

## 2019-09-25 NOTE — PROGRESS NOTES
found for: TSH     ASSESSMENT/PLAN:     Tye Koenig was seen today for other. Diagnoses and all orders for this visit:    Acute hepatitis C without hepatic coma  -     Glecaprevir-Pibrentasvir (MAVYRET) 100-40 MG TABS; Take 3 tablets by mouth daily  - [place] [facility] form filled out and faxed accordingly. - Medications sent to specialty pharmacy. - Patient aware that a PA would need to be done for medication to be covered and he verbalized understanding. Hep C Ab Interp REACTIVE Final 05/22/2019 2:20 PM [facility]  Hepatitis C Virus Ab by TIFFANIE Interpretation High Pos    Controlled Substances Monitoring:     No flowsheet data found. PATIENT COUNSELING     Counseling was provided today regarding the following topics: Healthy eating habits, Regular exercise, substance abuse and healthy sleep habits. Discussed use, benefit, and side effects of prescribed medications. Barriers to medication compliance addressed. All patient questions answered. Patient voiced understanding. There are no discontinued medications. Return in about 1 month (around 10/25/2019). SEAN Tam - CNP     Education was provided for discussed topics. Call the office with worsening complaints or any side effects to any medications. If an emergency please call 911.

## 2019-10-09 DIAGNOSIS — K21.9 GASTROESOPHAGEAL REFLUX DISEASE WITHOUT ESOPHAGITIS: Primary | ICD-10-CM

## 2019-10-09 DIAGNOSIS — B17.10 ACUTE HEPATITIS C WITHOUT HEPATIC COMA: ICD-10-CM

## 2019-10-10 ENCOUNTER — TELEPHONE (OUTPATIENT)
Dept: FAMILY MEDICINE CLINIC | Age: 44
End: 2019-10-10

## 2019-11-01 DIAGNOSIS — M77.8 RIGHT SHOULDER TENDONITIS: ICD-10-CM

## 2019-11-04 RX ORDER — IBUPROFEN 800 MG/1
TABLET ORAL
Qty: 90 TABLET | Refills: 3 | Status: SHIPPED | OUTPATIENT
Start: 2019-11-04 | End: 2020-08-03

## 2019-12-13 ENCOUNTER — OFFICE VISIT (OUTPATIENT)
Dept: FAMILY MEDICINE CLINIC | Age: 44
End: 2019-12-13
Payer: COMMERCIAL

## 2019-12-13 VITALS
RESPIRATION RATE: 18 BRPM | HEART RATE: 79 BPM | BODY MASS INDEX: 17.95 KG/M2 | SYSTOLIC BLOOD PRESSURE: 117 MMHG | WEIGHT: 125.1 LBS | DIASTOLIC BLOOD PRESSURE: 83 MMHG | OXYGEN SATURATION: 96 %

## 2019-12-13 DIAGNOSIS — M25.511 RIGHT SHOULDER PAIN, UNSPECIFIED CHRONICITY: Primary | ICD-10-CM

## 2019-12-13 PROCEDURE — 99213 OFFICE O/P EST LOW 20 MIN: CPT | Performed by: NURSE PRACTITIONER

## 2019-12-13 ASSESSMENT — ENCOUNTER SYMPTOMS
RESPIRATORY NEGATIVE: 1
EYES NEGATIVE: 1
GASTROINTESTINAL NEGATIVE: 1
ALLERGIC/IMMUNOLOGIC NEGATIVE: 1

## 2020-01-02 ENCOUNTER — TELEPHONE (OUTPATIENT)
Dept: FAMILY MEDICINE CLINIC | Age: 45
End: 2020-01-02

## 2020-01-02 RX ORDER — PANTOPRAZOLE SODIUM 40 MG/1
TABLET, DELAYED RELEASE ORAL
Qty: 90 TABLET | Refills: 3 | Status: SHIPPED | OUTPATIENT
Start: 2020-01-02 | End: 2020-11-23 | Stop reason: SDUPTHER

## 2020-01-02 NOTE — TELEPHONE ENCOUNTER
Medication:   Requested Prescriptions     Pending Prescriptions Disp Refills    pantoprazole (PROTONIX) 40 MG tablet 90 tablet 0       Patient Phone Number: [phone] (home)     Last appt: 12/13/2019   Next appt: Visit date not found    Last Labs DM: No results found for: LABA1C  Last Lipid:   Lab Results   Component Value Date    CHOL 211 04/08/2019    TRIG 243 04/08/2019    HDL 55 04/08/2019    LDLCALC 107 04/08/2019     Last PSA: No results found for: PSA  Last Thyroid: No results found for: TSH, FT3, V4UBXBW, T4FREE, C5QLDNG    Last OARRS: No flowsheet data found.

## 2020-02-10 ENCOUNTER — TRANSCRIBE ORDERS (OUTPATIENT)
Dept: ADMINISTRATIVE | Facility: HOSPITAL | Age: 45
End: 2020-02-10

## 2020-02-10 DIAGNOSIS — M25.511 RIGHT SHOULDER PAIN, UNSPECIFIED CHRONICITY: Primary | ICD-10-CM

## 2020-02-18 ENCOUNTER — HOSPITAL ENCOUNTER (OUTPATIENT)
Dept: MRI IMAGING | Facility: HOSPITAL | Age: 45
Discharge: HOME | End: 2020-02-18

## 2020-02-18 ENCOUNTER — HOSPITAL ENCOUNTER (OUTPATIENT)
Dept: GENERAL RADIOLOGY | Facility: HOSPITAL | Age: 45
Discharge: HOME | End: 2020-02-18
Admitting: ORTHOPAEDIC SURGERY

## 2020-02-18 DIAGNOSIS — M25.511 RIGHT SHOULDER PAIN, UNSPECIFIED CHRONICITY: ICD-10-CM

## 2020-02-18 PROCEDURE — A9577 INJ MULTIHANCE: HCPCS | Performed by: ORTHOPAEDIC SURGERY

## 2020-02-18 PROCEDURE — 73222 MRI JOINT UPR EXTREM W/DYE: CPT

## 2020-02-18 PROCEDURE — 0 GADOBENATE DIMEGLUMINE 529 MG/ML SOLUTION: Performed by: ORTHOPAEDIC SURGERY

## 2020-02-18 PROCEDURE — 25010000003 LIDOCAINE 1 % SOLUTION: Performed by: ORTHOPAEDIC SURGERY

## 2020-02-18 PROCEDURE — 77002 NEEDLE LOCALIZATION BY XRAY: CPT

## 2020-02-18 RX ORDER — LIDOCAINE HYDROCHLORIDE 10 MG/ML
10 INJECTION, SOLUTION INFILTRATION; PERINEURAL ONCE
Status: COMPLETED | OUTPATIENT
Start: 2020-02-18 | End: 2020-02-18

## 2020-02-18 RX ADMIN — GADOBENATE DIMEGLUMINE 1 ML: 529 INJECTION, SOLUTION INTRAVENOUS at 12:51

## 2020-02-18 RX ADMIN — LIDOCAINE HYDROCHLORIDE 10 ML: 10 INJECTION, SOLUTION INFILTRATION; PERINEURAL at 12:49

## 2020-02-20 ENCOUNTER — HOSPITAL ENCOUNTER (OUTPATIENT)
Facility: HOSPITAL | Age: 45
Discharge: HOME | End: 2020-02-20
Payer: COMMERCIAL

## 2020-02-20 PROCEDURE — 36415 COLL VENOUS BLD VENIPUNCTURE: CPT

## 2020-02-20 PROCEDURE — 80053 COMPREHEN METABOLIC PANEL: CPT

## 2020-02-21 LAB
A/G RATIO: 1.8 (ref 0.8–2)
ALBUMIN SERPL-MCNC: 4.5 G/DL (ref 3.4–4.8)
ALP BLD-CCNC: 85 U/L (ref 25–100)
ALT SERPL-CCNC: 25 U/L (ref 4–36)
ANION GAP SERPL CALCULATED.3IONS-SCNC: 12 MMOL/L (ref 3–16)
AST SERPL-CCNC: 18 U/L (ref 8–33)
BILIRUB SERPL-MCNC: 0.4 MG/DL (ref 0.3–1.2)
BUN BLDV-MCNC: 17 MG/DL (ref 6–20)
CALCIUM SERPL-MCNC: 9.4 MG/DL (ref 8.5–10.5)
CHLORIDE BLD-SCNC: 99 MMOL/L (ref 98–107)
CO2: 28 MMOL/L (ref 20–30)
CREAT SERPL-MCNC: 0.9 MG/DL (ref 0.4–1.2)
GFR AFRICAN AMERICAN: >59
GFR NON-AFRICAN AMERICAN: >59
GLOBULIN: 2.5 G/DL
GLUCOSE BLD-MCNC: 96 MG/DL (ref 74–106)
POTASSIUM SERPL-SCNC: 4.3 MMOL/L (ref 3.4–5.1)
SODIUM BLD-SCNC: 139 MMOL/L (ref 136–145)
TOTAL PROTEIN: 7 G/DL (ref 6.4–8.3)

## 2020-06-18 ENCOUNTER — TRANSCRIBE ORDERS (OUTPATIENT)
Dept: ADMINISTRATIVE | Facility: HOSPITAL | Age: 45
End: 2020-06-18

## 2020-06-18 DIAGNOSIS — M25.511 RIGHT SHOULDER PAIN, UNSPECIFIED CHRONICITY: Primary | ICD-10-CM

## 2020-07-07 ENCOUNTER — HOSPITAL ENCOUNTER (OUTPATIENT)
Dept: GENERAL RADIOLOGY | Facility: HOSPITAL | Age: 45
Discharge: HOME | End: 2020-07-07
Admitting: ORTHOPAEDIC SURGERY

## 2020-07-07 ENCOUNTER — HOSPITAL ENCOUNTER (OUTPATIENT)
Dept: MRI IMAGING | Facility: HOSPITAL | Age: 45
Discharge: HOME | End: 2020-07-07

## 2020-07-07 DIAGNOSIS — M25.511 RIGHT SHOULDER PAIN, UNSPECIFIED CHRONICITY: ICD-10-CM

## 2020-07-07 PROCEDURE — 0 GADOBENATE DIMEGLUMINE 529 MG/ML SOLUTION: Performed by: ORTHOPAEDIC SURGERY

## 2020-07-07 PROCEDURE — A9577 INJ MULTIHANCE: HCPCS | Performed by: ORTHOPAEDIC SURGERY

## 2020-07-07 PROCEDURE — 25010000003 LIDOCAINE 1 % SOLUTION: Performed by: ORTHOPAEDIC SURGERY

## 2020-07-07 PROCEDURE — 25010000002 IOPAMIDOL 61 % SOLUTION: Performed by: ORTHOPAEDIC SURGERY

## 2020-07-07 PROCEDURE — 73222 MRI JOINT UPR EXTREM W/DYE: CPT

## 2020-07-07 PROCEDURE — 77002 NEEDLE LOCALIZATION BY XRAY: CPT

## 2020-07-07 RX ORDER — LIDOCAINE HYDROCHLORIDE 10 MG/ML
15 INJECTION, SOLUTION INFILTRATION; PERINEURAL ONCE
Status: COMPLETED | OUTPATIENT
Start: 2020-07-07 | End: 2020-07-07

## 2020-07-07 RX ADMIN — IOPAMIDOL 10 ML: 612 INJECTION, SOLUTION INTRAVENOUS at 09:21

## 2020-07-07 RX ADMIN — GADOBENATE DIMEGLUMINE 1 ML: 529 INJECTION, SOLUTION INTRAVENOUS at 10:00

## 2020-07-07 RX ADMIN — LIDOCAINE HYDROCHLORIDE 15 ML: 10 INJECTION, SOLUTION INFILTRATION; PERINEURAL at 09:21

## 2020-08-03 ENCOUNTER — HOSPITAL ENCOUNTER (EMERGENCY)
Facility: HOSPITAL | Age: 45
Discharge: HOME | End: 2020-08-03
Attending: HOSPITALIST
Payer: MEDICAID

## 2020-08-03 ENCOUNTER — APPOINTMENT (OUTPATIENT)
Dept: GENERAL RADIOLOGY | Facility: HOSPITAL | Age: 45
End: 2020-08-03
Payer: MEDICAID

## 2020-08-03 VITALS
TEMPERATURE: 98 F | OXYGEN SATURATION: 99 % | HEART RATE: 86 BPM | WEIGHT: 130 LBS | SYSTOLIC BLOOD PRESSURE: 112 MMHG | BODY MASS INDEX: 18.61 KG/M2 | RESPIRATION RATE: 18 BRPM | HEIGHT: 70 IN | DIASTOLIC BLOOD PRESSURE: 77 MMHG

## 2020-08-03 PROCEDURE — 6370000000 HC RX 637 (ALT 250 FOR IP): Performed by: HOSPITALIST

## 2020-08-03 PROCEDURE — 99283 EMERGENCY DEPT VISIT LOW MDM: CPT

## 2020-08-03 PROCEDURE — 73562 X-RAY EXAM OF KNEE 3: CPT

## 2020-08-03 RX ORDER — IBUPROFEN 800 MG/1
800 TABLET ORAL EVERY 6 HOURS PRN
Qty: 20 TABLET | Refills: 0 | Status: SHIPPED | OUTPATIENT
Start: 2020-08-03 | End: 2020-11-23 | Stop reason: SDUPTHER

## 2020-08-03 RX ORDER — CEPHALEXIN 500 MG/1
500 CAPSULE ORAL 3 TIMES DAILY
Qty: 30 CAPSULE | Refills: 0 | Status: SHIPPED | OUTPATIENT
Start: 2020-08-03 | End: 2020-08-13

## 2020-08-03 RX ORDER — OXYCODONE HYDROCHLORIDE AND ACETAMINOPHEN 5; 325 MG/1; MG/1
1 TABLET ORAL ONCE
Status: COMPLETED | OUTPATIENT
Start: 2020-08-03 | End: 2020-08-03

## 2020-08-03 RX ADMIN — OXYCODONE HYDROCHLORIDE AND ACETAMINOPHEN 1 TABLET: 5; 325 TABLET ORAL at 15:58

## 2020-08-03 ASSESSMENT — PAIN DESCRIPTION - PROGRESSION: CLINICAL_PROGRESSION: GRADUALLY WORSENING

## 2020-08-03 ASSESSMENT — PAIN DESCRIPTION - ORIENTATION: ORIENTATION: RIGHT

## 2020-08-03 ASSESSMENT — PAIN DESCRIPTION - PAIN TYPE: TYPE: ACUTE PAIN

## 2020-08-03 ASSESSMENT — PAIN DESCRIPTION - LOCATION: LOCATION: KNEE

## 2020-08-03 ASSESSMENT — PAIN SCALES - GENERAL: PAINLEVEL_OUTOF10: 7

## 2020-08-03 ASSESSMENT — PAIN DESCRIPTION - DESCRIPTORS
DESCRIPTORS: DULL
DESCRIPTORS: THROBBING;DULL

## 2020-08-03 ASSESSMENT — PAIN DESCRIPTION - FREQUENCY: FREQUENCY: CONTINUOUS

## 2020-08-03 NOTE — ED TRIAGE NOTES
Patient to ED with complaints of right knee pain. States he slipped on a rock and hit his knee yesterday. He says it hurts real bad and its now yellow and green. Patient states he ran out of Tylenol #3 on Friday, that he had been taking for his shoulder.

## 2020-08-03 NOTE — ED PROVIDER NOTES
(up to 7 for level 4, 8 or more for level 5)     ED Triage Vitals [08/03/20 1554]   BP Temp Temp Source Pulse Resp SpO2 Height Weight   110/79 98 °F (36.7 °C) Oral 99 20 99 % 5' 10\" (1.778 m) 130 lb (59 kg)       Physical Exam  General :Patient is awake, alert, oriented, in no acute distress, nontoxic appearing  HEENT: Pupils are equally round and reactive to light, EOMI, conjunctivae clear. Oral mucosa is moist, no exudate. Uvula is midline  Cardiac: Heart regular rate, rhythm, no murmurs, rubs, or gallops  Lungs: Lungs are clear to auscultation, there is no wheezing, rhonchi, or rales. There is no use of accessory muscles. Abdomen: Abdomen is soft, nontender, nondistended. There is no firm or pulsatile masses, no rebound rigidity or guarding. Musculoskeletal: 5 out of 5 strength in all 4 extremities. No focal muscle deficits are appreciated, patient's right knee does have a obvious abrasion to the medial aspect of it but there is multiple scratches or abrasions around the knee itself. There is some mild discoloration but is more greenish to yellow looking. The injury looks to be older than just the 1 day as the patient describes. There is no pain to the posterior fossa. He does have palpable tenderness over the entire aspect of the patella. There is no obvious effusion or swelling of the knee noted in comparison to the left knee. Range of motion is decreased secondary to pain. No erythema or swelling of the calf. Negative cords sign. Negative Homans sign. There is no obvious cellulitic change around the edges of the abrasion. There is no induration no fluctuance. Neuro: Motor intact, sensory intact, level of consciousness is normal  Dermatology: Skin is warm and dry  Psych: Mentation is grossly normal, cognition is grossly normal. Affect is appropriate.       DIAGNOSTIC RESULTS     EKG: All EKG's are interpreted by the Emergency Department Physician who either signs or Co-signs this chart in the [address]  [phone]    Schedule an appointment as soon as possible for a visit in 1 week  follows with Dr. Feliz Farm:  New Prescriptions    CEPHALEXIN (KEFLEX) 500 MG CAPSULE    Take 1 capsule by mouth 3 times daily for 10 days    IBUPROFEN (IBU) 800 MG TABLET    Take 1 tablet by mouth every 6 hours as needed for Pain       Comment: Please note this report has been produced using speech recognition software and may contain errorsrelated to that system including errors in grammar, punctuation, and spelling, as well as words and phrases that may be inappropriate. If there are any questions or concerns please feel free to contact the dictating providerfor clarification.     Christian Harley DO  Attending Emergency Physician              Christian Harley DO  08/03/20 9943

## 2020-09-04 ENCOUNTER — TELEPHONE (OUTPATIENT)
Dept: FAMILY MEDICINE CLINIC | Age: 45
End: 2020-09-04

## 2020-11-19 ENCOUNTER — TELEPHONE (OUTPATIENT)
Dept: FAMILY MEDICINE CLINIC | Age: 45
End: 2020-11-19

## 2020-11-19 ENCOUNTER — NURSE TRIAGE (OUTPATIENT)
Dept: OTHER | Facility: CLINIC | Age: 45
End: 2020-11-19

## 2020-11-19 NOTE — TELEPHONE ENCOUNTER
Patient called [address] [place] [address] Road contact center [facility]) with red flag complaint; transferred for triage by Nicole Grijalva. Pt calls to report symptoms of breathing difficulty, weakness, fatigue, muscle pain, runny nose. States symptoms started a few months ago. Rates breathing difficulty as moderate. Reports having passed out and woken up on the floor multiple times. States having a lot of dizziness and intermittent chest pain as well. Pt reports having a bluish ring around his lips at this time. Informed of disposition. Pt does not want to call 911. Strongly encouraged patient to follow disposition. Provided with the risks of not following disposition and informed them that this is best practice. Pt agreeable to call 911 at this time. Caller provided care advice and instructed to call back with worsening symptoms. Verbalized understanding and denies any further questions/concerns. Attention Provider: Thank you for allowing me to participate in the care of your patient. The patient was connected to triage in response to information provided to the ECC. Please do not respond through this encounter as the response is not directed to a shared pool. Reason for Disposition   Bluish (or gray) lips or face    Answer Assessment - Initial Assessment Questions  1. RESPIRATORY STATUS: \"Describe your breathing? \" (e.g., wheezing, shortness of breath, unable to speak, severe coughing)       Sob with any exertion    2. ONSET: \"When did this breathing problem begin? \"       Few months ago    3. PATTERN \"Does the difficult breathing come and go, or has it been constant since it started? \"       Constant    4. SEVERITY: \"How bad is your breathing? \" (e.g., mild, moderate, severe)     - MILD: No SOB at rest, mild SOB with walking, speaks normally in sentences, can lay down, no retractions, pulse < 100.     - MODERATE: SOB at rest, SOB with minimal exertion and prefers to sit, cannot lie down flat, speaks in phrases, mild retractions, audible wheezing, pulse 100-120.     - SEVERE: Very SOB at rest, speaks in single words, struggling to breathe, sitting hunched forward, retractions, pulse > 120       moderate    5. RECURRENT SYMPTOM: \"Have you had difficulty breathing before? \" If so, ask: \"When was the last time? \" and \"What happened that time? \"       No    6. CARDIAC HISTORY: \"Do you have any history of heart disease? \" (e.g., heart attack, angina, bypass surgery, angioplasty)       No    7. LUNG HISTORY: \"Do you have any history of lung disease? \"  (e.g., pulmonary embolus, asthma, emphysema)      No    8. CAUSE: \"What do you think is causing the breathing problem? \"       unknown    9. OTHER SYMPTOMS: \"Do you have any other symptoms? (e.g., dizziness, runny nose, cough, chest pain, fever)      Syncope, weakness, fatigue, muscle pain, dizziness, chest pain    10. PREGNANCY: \"Is there any chance you are pregnant? \" \"When was your last menstrual period? \"      NA    11. TRAVEL: \"Have you traveled out of the country in the last month? \" (e.g., travel history, exposures)      No    Protocols used: BREATHING DIFFICULTY-ADULT-OH

## 2020-11-23 ENCOUNTER — HOSPITAL ENCOUNTER (OUTPATIENT)
Facility: HOSPITAL | Age: 45
Discharge: HOME | End: 2020-11-23
Payer: MEDICAID

## 2020-11-23 ENCOUNTER — OFFICE VISIT (OUTPATIENT)
Dept: FAMILY MEDICINE CLINIC | Age: 45
End: 2020-11-23
Payer: MEDICAID

## 2020-11-23 VITALS
HEART RATE: 79 BPM | DIASTOLIC BLOOD PRESSURE: 80 MMHG | SYSTOLIC BLOOD PRESSURE: 120 MMHG | BODY MASS INDEX: 17.79 KG/M2 | WEIGHT: 124 LBS | OXYGEN SATURATION: 99 % | TEMPERATURE: 97.6 F

## 2020-11-23 LAB
A/G RATIO: 1.8 (ref 0.8–2)
ALBUMIN SERPL-MCNC: 4.8 G/DL (ref 3.4–4.8)
ALP BLD-CCNC: 106 U/L (ref 25–100)
ALT SERPL-CCNC: 24 U/L (ref 4–36)
ANION GAP SERPL CALCULATED.3IONS-SCNC: 9 MMOL/L (ref 3–16)
AST SERPL-CCNC: 19 U/L (ref 8–33)
BASOPHILS ABSOLUTE: 0.1 K/UL (ref 0–0.1)
BASOPHILS RELATIVE PERCENT: 1.2 %
BILIRUB SERPL-MCNC: 0.4 MG/DL (ref 0.3–1.2)
BUN BLDV-MCNC: 15 MG/DL (ref 6–20)
CALCIUM SERPL-MCNC: 9.9 MG/DL (ref 8.5–10.5)
CHLORIDE BLD-SCNC: 98 MMOL/L (ref 98–107)
CHOLESTEROL, TOTAL: 258 MG/DL (ref 0–200)
CO2: 29 MMOL/L (ref 20–30)
CREAT SERPL-MCNC: 0.8 MG/DL (ref 0.4–1.2)
EOSINOPHILS ABSOLUTE: 0.2 K/UL (ref 0–0.4)
EOSINOPHILS RELATIVE PERCENT: 2.6 %
FOLATE: 5.51 NG/ML
GFR AFRICAN AMERICAN: >59
GFR NON-AFRICAN AMERICAN: >59
GLOBULIN: 2.6 G/DL
GLUCOSE BLD-MCNC: 90 MG/DL (ref 74–106)
HBA1C MFR BLD: 5.4 %
HCT VFR BLD CALC: 50.4 % (ref 40–54)
HDLC SERPL-MCNC: 53 MG/DL (ref 40–60)
HEMOGLOBIN: 15.9 G/DL (ref 13–18)
IMMATURE GRANULOCYTES #: 0 K/UL
IMMATURE GRANULOCYTES %: 0.2 % (ref 0–5)
LDL CHOLESTEROL CALCULATED: 171 MG/DL
LYMPHOCYTES ABSOLUTE: 2.1 K/UL (ref 1.5–4)
LYMPHOCYTES RELATIVE PERCENT: 37.3 %
MCH RBC QN AUTO: 29.7 PG (ref 27–32)
MCHC RBC AUTO-ENTMCNC: 31.5 G/DL (ref 31–35)
MCV RBC AUTO: 94 FL (ref 80–100)
MONOCYTES ABSOLUTE: 0.3 K/UL (ref 0.2–0.8)
MONOCYTES RELATIVE PERCENT: 5.6 %
NEUTROPHILS ABSOLUTE: 3 K/UL (ref 2–7.5)
NEUTROPHILS RELATIVE PERCENT: 53.1 %
PDW BLD-RTO: 11.9 % (ref 11–16)
PLATELET # BLD: 268 K/UL (ref 150–400)
PMV BLD AUTO: 10.5 FL (ref 6–10)
POTASSIUM SERPL-SCNC: 5.2 MMOL/L (ref 3.4–5.1)
PROSTATE SPECIFIC ANTIGEN: 2.24 NG/ML (ref 0–4)
RBC # BLD: 5.36 M/UL (ref 4.5–6)
SODIUM BLD-SCNC: 136 MMOL/L (ref 136–145)
TOTAL PROTEIN: 7.4 G/DL (ref 6.4–8.3)
TRIGL SERPL-MCNC: 168 MG/DL (ref 0–249)
VITAMIN B-12: 366 PG/ML (ref 211–911)
VITAMIN D 25-HYDROXY: 25.1 (ref 32–100)
VLDLC SERPL CALC-MCNC: 34 MG/DL
WBC # BLD: 5.7 K/UL (ref 4–11)

## 2020-11-23 PROCEDURE — 86431 RHEUMATOID FACTOR QUANT: CPT

## 2020-11-23 PROCEDURE — 86140 C-REACTIVE PROTEIN: CPT

## 2020-11-23 PROCEDURE — 82607 VITAMIN B-12: CPT

## 2020-11-23 PROCEDURE — 99214 OFFICE O/P EST MOD 30 MIN: CPT | Performed by: NURSE PRACTITIONER

## 2020-11-23 PROCEDURE — 80053 COMPREHEN METABOLIC PANEL: CPT

## 2020-11-23 PROCEDURE — 80061 LIPID PANEL: CPT

## 2020-11-23 PROCEDURE — 84153 ASSAY OF PSA TOTAL: CPT

## 2020-11-23 PROCEDURE — 36415 COLL VENOUS BLD VENIPUNCTURE: CPT

## 2020-11-23 PROCEDURE — 85025 COMPLETE CBC W/AUTO DIFF WBC: CPT

## 2020-11-23 PROCEDURE — G8431 POS CLIN DEPRES SCRN F/U DOC: HCPCS | Performed by: NURSE PRACTITIONER

## 2020-11-23 PROCEDURE — 83036 HEMOGLOBIN GLYCOSYLATED A1C: CPT | Performed by: NURSE PRACTITIONER

## 2020-11-23 PROCEDURE — 86038 ANTINUCLEAR ANTIBODIES: CPT

## 2020-11-23 PROCEDURE — 82746 ASSAY OF FOLIC ACID SERUM: CPT

## 2020-11-23 PROCEDURE — 82306 VITAMIN D 25 HYDROXY: CPT

## 2020-11-23 RX ORDER — PANTOPRAZOLE SODIUM 40 MG/1
TABLET, DELAYED RELEASE ORAL
Qty: 90 TABLET | Refills: 3 | Status: SHIPPED | OUTPATIENT
Start: 2020-11-23 | End: 2021-03-03 | Stop reason: SDUPTHER

## 2020-11-23 RX ORDER — IBUPROFEN 800 MG/1
800 TABLET ORAL EVERY 6 HOURS PRN
Qty: 90 TABLET | Refills: 5 | Status: SHIPPED | OUTPATIENT
Start: 2020-11-23 | End: 2021-03-03 | Stop reason: SDUPTHER

## 2020-11-23 ASSESSMENT — PATIENT HEALTH QUESTIONNAIRE - PHQ9
2. FEELING DOWN, DEPRESSED OR HOPELESS: 3
3. TROUBLE FALLING OR STAYING ASLEEP: 3
8. MOVING OR SPEAKING SO SLOWLY THAT OTHER PEOPLE COULD HAVE NOTICED. OR THE OPPOSITE, BEING SO FIGETY OR RESTLESS THAT YOU HAVE BEEN MOVING AROUND A LOT MORE THAN USUAL: 2
9. THOUGHTS THAT YOU WOULD BE BETTER OFF DEAD, OR OF HURTING YOURSELF: 0
4. FEELING TIRED OR HAVING LITTLE ENERGY: 3
SUM OF ALL RESPONSES TO PHQ QUESTIONS 1-9: 23
SUM OF ALL RESPONSES TO PHQ QUESTIONS 1-9: 23
SUM OF ALL RESPONSES TO PHQ9 QUESTIONS 1 & 2: 6
SUM OF ALL RESPONSES TO PHQ QUESTIONS 1-9: 23
5. POOR APPETITE OR OVEREATING: 3
7. TROUBLE CONCENTRATING ON THINGS, SUCH AS READING THE NEWSPAPER OR WATCHING TELEVISION: 3
6. FEELING BAD ABOUT YOURSELF - OR THAT YOU ARE A FAILURE OR HAVE LET YOURSELF OR YOUR FAMILY DOWN: 3
1. LITTLE INTEREST OR PLEASURE IN DOING THINGS: 3
10. IF YOU CHECKED OFF ANY PROBLEMS, HOW DIFFICULT HAVE THESE PROBLEMS MADE IT FOR YOU TO DO YOUR WORK, TAKE CARE OF THINGS AT HOME, OR GET ALONG WITH OTHER PEOPLE: 3

## 2020-11-23 ASSESSMENT — ENCOUNTER SYMPTOMS
NAUSEA: 1
STRIDOR: 0
CHEST TIGHTNESS: 1
BELCHING: 1
COUGH: 0
DIARRHEA: 0
HEARTBURN: 1
WHEEZING: 0
ABDOMINAL DISTENTION: 0
SHORTNESS OF BREATH: 1
ABDOMINAL PAIN: 1
CHOKING: 0
CONSTIPATION: 0
BLOOD IN STOOL: 0
ANAL BLEEDING: 0
ALLERGIC/IMMUNOLOGIC NEGATIVE: 1
VOMITING: 0
APNEA: 0

## 2020-11-23 ASSESSMENT — COLUMBIA-SUICIDE SEVERITY RATING SCALE - C-SSRS
2. HAVE YOU ACTUALLY HAD ANY THOUGHTS OF KILLING YOURSELF?: NO
6. HAVE YOU EVER DONE ANYTHING, STARTED TO DO ANYTHING, OR PREPARED TO DO ANYTHING TO END YOUR LIFE?: NO
1. WITHIN THE PAST MONTH, HAVE YOU WISHED YOU WERE DEAD OR WISHED YOU COULD GO TO SLEEP AND NOT WAKE UP?: NO

## 2020-11-23 NOTE — PATIENT INSTRUCTIONS
Education and Discharge Instructions:  Keep a list of your medicines with you at all times. Always bring a up to date list or the medication bottles when going to the doctor or hospital.   Always follow the directions on your medications unless the doctor or nurse has instructed you otherwise. Keep all medications out of reach of children. Store medicines according to the directions on the label. Seek emergency medical attention if you think you have used too much medication. A overdose can be fatal.  Don't share your medicines with anyone. It may harm them. Discard any unused or out dated medications. If you have any questions, ask your provider or pharmacist for more information. Be sure to keep all appointments for provider visits or tests. Please continue all your medications that the Provider has prescribed for you unless other [facility]    1. Mental Health Info and Treatment [phone]  2. Drug and Alcohol Detox Rehab treatment 24 hr [phone]  3. Stop Smoking Classes- [phone]  4. [address] Program- prescription [phone]  5. Hospice Care [phone]  6. Adult/Child Protection [phone]          . We are committed to providing you with the best care possible. In order to help us achieve these goals please remember to bring all medications, herbal products, and over the counter supplements with you to each visit. If your provider has ordered testing for you, please be sure to follow up with our office if you have not received results within 7 days after the testing took place. *If you receive a survey after visiting one of our offices, please take time to share your experience concerning your physician office visit. These surveys are confidential and no health information about you is shared.   We are eager to improve for you and we are counting on your feedback to help make that happen

## 2020-11-23 NOTE — PROGRESS NOTES
SUBJECTIVE:    Camryn Butterfield is a 40 y.o. male    Pt presents for evaluation of GERD, Arthralgia, Shortness of breath and +depression screening. Pt request refill on protonix 40 mg daily and Motrin 800mg q 6 hrs PRN for arthralgia. Onset of arthralgia was a few years ago. Patient c/o bilateral hip and knees. Pt has been evaluated by rheumatology. Pt reports he was told he has arthritis and started on arthritis medication that did not help so he stopped following up. Rheumatology was in [place], [place]. Pt c/o morning stiffness that lasts approx 1 hour. Pt reports he has been a smoker for approx 30 years. He has always smoked less than 1 PPD. Pt reports mild shortness of breath over the past couple of years that has became worse over the past 3-4 months. Pt reports shortness of breath is worse with exertion. Pt reports he got up to walk into the living room and passed out in the floor. Pt report 2 episodes of syncope. Pt reports he was really weak, nauseated and sweating. Pt reports he was evaluated by EMS but he declined transport to the ER. Pt reports he developed tunnel vision prior to passing out. Both episodes happened after standing up. Pt also reports hx of hypoglycemia. Pt reports he is also sleeping 12 hours and still feels tired all the time. Pt states \"I don't have the energy to do anything\". Pt reports he only showers twice a month because he doesn't have the energy to shower. Denies family hx of heart disease. When asked about fluid intake, Pt states \"I know I don't drink enough, I drink coffee and mountain Dew. \"  Pt reports he gets \"tunnel vision\" when standing up quickly form lying or sitting position. + depression screening. Pt reports he has previously been admitted to [address] in 2005- for 17 days- dx with manic depression/bipolar. Pt was also admitted to [address] in 2015 for similar symptoms. Pt was also under the care of [facility].   Pt reports he has tried every possible medication. Pt reports it felt like \"hundreds\". Denies thoughts of suicide or self harm. Gastroesophageal Reflux   He complains of abdominal pain (denies while taking protonix), belching, chest pain (chest tightness when short of breath), heartburn and nausea (after syncopal episode). He reports no choking, no coughing or no wheezing. This is a chronic problem. The current episode started more than 1 year ago. Episode frequency: no symptoms while taking Protonix. Progression since onset: stable on protonix. The symptoms are aggravated by certain foods. Associated symptoms include fatigue. He has tried a PPI for the symptoms. The treatment provided significant relief. Past procedures include an EGD (by Dr Michael Salter. + hiatal hernia.). Shortness of Breath   This is a chronic problem. The current episode started more than 1 year ago (worse over the past 3-4 months). The problem occurs constantly. The problem has been gradually worsening. Associated symptoms include abdominal pain (denies while taking protonix) and chest pain (chest tightness when short of breath). Pertinent negatives include no fever, leg swelling, vomiting or wheezing. Chief Complaint   Patient presents with    Gastroesophageal Reflux    Shortness of Breath     new, developed over the past couple of months    Joint Pain     refill on Ibuprofen        Review of Systems   Constitutional: Positive for activity change, appetite change (\"when i feel weak I know i need to eat\"), diaphoresis (when glucose drops- pt reports hx of hypoglycemia) and fatigue. Negative for fever and unexpected weight change. HENT: Negative. Respiratory: Positive for chest tightness and shortness of breath. Negative for apnea, cough, choking, wheezing and stridor. Cardiovascular: Positive for chest pain (chest tightness when short of breath) and palpitations (Increased heart rate at times. ). Negative for leg swelling.    Gastrointestinal: Positive for abdominal pain (denies while taking protonix), heartburn and nausea (after syncopal episode). Negative for abdominal distention, anal bleeding, blood in stool, constipation, diarrhea and vomiting. Endocrine: Positive for polyuria (at night- nocturia). Negative for cold intolerance, heat intolerance, polydipsia and polyphagia. Genitourinary: Negative.         + Nocturia- 5-6 times at night. Musculoskeletal: Negative. Skin: Negative. Allergic/Immunologic: Negative. Neurological: Positive for syncope (2 episodes over the past 4-5 months ago. ) and weakness. Psychiatric/Behavioral: Positive for behavioral problems (Short temper.), decreased concentration and dysphoric mood. Negative for hallucinations, self-injury, sleep disturbance and suicidal ideas. The patient is nervous/anxious. The patient is not hyperactive. OBJECTIVE:    /80   Pulse 79   Temp 97.6 °F (36.4 °C) (Temporal)   Wt 124 lb (56.2 kg)   SpO2 99%   BMI 17.79 kg/m²    Physical Exam  Vitals signs and nursing note reviewed. Constitutional:       General: He is not in acute distress. Appearance: Normal appearance. He is well-developed. He is not ill-appearing, toxic-appearing or diaphoretic. Comments: Pt is underweight. Pt reports he weight has always been around 125# and stable. HENT:      Head: Normocephalic. Eyes:      Extraocular Movements: Extraocular movements intact. Conjunctiva/sclera: Conjunctivae normal.      Pupils: Pupils are equal, round, and reactive to light. Neck:      Thyroid: No thyromegaly. Vascular: No carotid bruit. Cardiovascular:      Rate and Rhythm: Normal rate and regular rhythm. Heart sounds: Normal heart sounds. No murmur. Pulmonary:      Effort: Pulmonary effort is normal.      Breath sounds: Normal breath sounds. Abdominal:      General: Abdomen is flat. Skin:     General: Skin is warm and dry.       Coloration: Skin is not ashen, cyanotic, jaundiced, factor, SHIRA,Sed rate, CRP today. Joint stiffness  -     Rheumatoid Factor; Future  -     Sedimentation rate, automated; Future  -     C-Reactive Protein; Future  -     SHIRA; Future    Multiple joint pain  -     ibuprofen (IBU) 800 MG tablet; Take 1 tablet by mouth every 6 hours as needed for Pain  -     Rheumatoid Factor; Future  -     Sedimentation rate, automated; Future  -     C-Reactive Protein; Future  -     SHIRA; Future    Hypoglycemia  -     CBC Auto Differential; Future  -     Comprehensive Metabolic Panel; Future  -     POCT glycosylated hemoglobin (Hb A1C)    Fatigue, unspecified type  -     Vitamin B12 & Folate; Future  -     Vitamin D 25 Hydroxy; Future    Nocturia  -     PSA, Prostatic Specific Antigen; Future    Moderate episode of recurrent major depressive disorder (HCC)  -     cariprazine hcl (VRAYLAR) 1.5 MG capsule; Take 1 capsule by mouth daily    Screening cholesterol level  -     Lipid Panel; Future    History of bipolar disorder  -     cariprazine hcl (VRAYLAR) 1.5 MG capsule; Take 1 capsule by mouth daily- pt advised of possible side effects of medication. Pt instructed to stop medication if symptoms worsen or he develops abnormal thoughts. Pt verbalized understanding.   -     External Referral To Psychiatry    Light-headedness/syncope- symptoms are consisted with orthostatic hypotension. I have advised patient to limit caffeine intake and increase intake of water. I have also advised patient to sit on the side of the bed before standing. Then stand up on the side of the bed for a few moments to make sure he does not have light-headedness before walking thru the house. If light-headedness occurs sit down until symptoms improve. Pt advised to go to the ER if he experiences syncope. Pt verbalized understanding. I have recommended that this patient have a immunization for influenza but he declines at this time. I have discussed the risks and benefits of this examination with him.  The patient verbalizes understanding. Return in about 14 weeks (around 3/1/2021) for f/u starting Vraylar- increase dose if indicated, Discuss PFT. Manuel Boone No current outpatient medications on file prior to visit. No current facility-administered medications on file prior to visit.

## 2020-11-23 NOTE — PROGRESS NOTES
Have you seen any other physician or provider since your last visit yes - [facility] Feb 2020    Have you had any other diagnostic tests since your last visit? no    Have you changed or stopped any medications since your last visit? no

## 2020-11-24 LAB
ANTI-NUCLEAR ANTIBODY (ANA): NEGATIVE
C-REACTIVE PROTEIN: <0.3 MG/L (ref 0–5.1)
RHEUMATOID FACTOR: 26 IU/ML

## 2020-12-01 NOTE — PROGRESS NOTES
I have recommended that this patient have a flu shot but he declines at this time. I have discussed the risks and benefits of this examination with him. The patient verbalizes understanding.   Health Maintenance Due This Visit   Colonoscopy No   Mammogram No   Annual Wellness Visit No   Microalbumin No   HgbA1C No   Diabetic Eye Exam No    House Bill One Due This Visit   AASHISH No   UDS No   Contract No      Health Maintenance Due   Topic Date Due    Flu vaccine (1) 09/01/2020

## 2020-12-08 ENCOUNTER — OFFICE VISIT (OUTPATIENT)
Dept: FAMILY MEDICINE CLINIC | Age: 45
End: 2020-12-08
Payer: MEDICAID

## 2020-12-08 VITALS
BODY MASS INDEX: 18.18 KG/M2 | HEIGHT: 70 IN | WEIGHT: 127 LBS | DIASTOLIC BLOOD PRESSURE: 70 MMHG | TEMPERATURE: 98 F | OXYGEN SATURATION: 98 % | RESPIRATION RATE: 20 BRPM | SYSTOLIC BLOOD PRESSURE: 117 MMHG | HEART RATE: 96 BPM

## 2020-12-08 PROCEDURE — 99213 OFFICE O/P EST LOW 20 MIN: CPT | Performed by: NURSE PRACTITIONER

## 2020-12-08 RX ORDER — SIMVASTATIN 10 MG
10 TABLET ORAL NIGHTLY
Qty: 30 TABLET | Refills: 5 | Status: SHIPPED | OUTPATIENT
Start: 2020-12-08 | End: 2021-03-15

## 2020-12-08 RX ORDER — ERGOCALCIFEROL 1.25 MG/1
50000 CAPSULE ORAL WEEKLY
Qty: 4 CAPSULE | Refills: 5 | Status: SHIPPED | OUTPATIENT
Start: 2020-12-08 | End: 2021-03-03 | Stop reason: SDUPTHER

## 2020-12-08 ASSESSMENT — ENCOUNTER SYMPTOMS
CHEST TIGHTNESS: 1
ABDOMINAL PAIN: 0
WHEEZING: 0
VOMITING: 0
APNEA: 0
BLOOD IN STOOL: 0
CHOKING: 0
NAUSEA: 0
SHORTNESS OF BREATH: 1
ABDOMINAL DISTENTION: 0
CONSTIPATION: 0
STRIDOR: 0
ALLERGIC/IMMUNOLOGIC NEGATIVE: 1
ANAL BLEEDING: 0
COUGH: 0
DIARRHEA: 0

## 2020-12-08 NOTE — PROGRESS NOTES
SUBJECTIVE:    Chun Burgess is a 40 y.o. male    Pt presents for re evaluation of bipolar depression. Pt was prescribed Vraylar 1.5 mg daily. A Prior authorization was requested and completed for Javy Dontae but pt reports after reading about this medication he is not going to take it. Pt has an appt with [facility] on 12/15/2020 and agrees to follow up with [facility] for treatment of depression/bipolar. Pt also presents for abnormal labs. Pt had elevated Rheumatoid factor- 26.0. Pt c/o arthralgia. Onset of arthralgia was a few years ago. Patient c/o bilateral hip and knee pain. Pt has been evaluated by rheumatology. Pt reports he was told he has arthritis and started on arthritis medication that did not help so he stopped following up. Rheumatology was in [place], [place]. Pt c/o morning stiffness that lasts approx 1 hour. Pt also had low vitamin D- 25.1 on 11/23/2020. Pt also had elevated LDL and total cholesterol.   Lab Results       Component                Value               Date                       CHOL                     258 (H)             11/23/2020                 CHOL                     211 (H)             04/08/2019            Lab Results       Component                Value               Date                       TRIG                     168                 11/23/2020                 TRIG                     243                 04/08/2019            Lab Results       Component                Value               Date                       HDL                      53                  11/23/2020                 HDL                      55                  04/08/2019            Lab Results       Component                Value               Date                       LDLCALC                  171 (H)             11/23/2020                 LDLCALC                  107                 04/08/2019            Lab Results       Component                Value               Date                       LABVLDL 34                  11/23/2020                 LABVLDL                  49                  04/08/2019            No results found for: KIMBERLEY    Pt c/o shortness of breath. Pt is a 0.5 PPD smoker for approx 30 years. Denies cough. Denies wheezing. Pt reports he experience chest pain pressure approx once a week. Symptoms last a few minute to a few hours. Nothing makes pain better or worse. Pt reports he noticed the pain more when he is under stressed. Denies radiation of pain.  + mild nausea. Denies syncope or dizziness associated with chest pain. Referral to cardiology is pending. Chief Complaint   Patient presents with    Depression     f/u Bipolar    Abnormal Test Results        Review of Systems   Constitutional: Positive for activity change and fatigue. Negative for diaphoresis, fever and unexpected weight change. HENT: Negative. Respiratory: Positive for chest tightness and shortness of breath. Negative for apnea, cough, choking, wheezing and stridor. Cardiovascular: Positive for chest pain (denies at this time. ). Negative for palpitations and leg swelling. Gastrointestinal: Negative for abdominal distention, abdominal pain, anal bleeding, blood in stool, constipation, diarrhea, nausea and vomiting. Genitourinary: Negative. Musculoskeletal: Negative. Skin: Negative. Allergic/Immunologic: Negative. Neurological: Positive for weakness. Negative for dizziness and light-headedness. Psychiatric/Behavioral: Positive for behavioral problems (Short temper.), decreased concentration and dysphoric mood. Negative for hallucinations, self-injury, sleep disturbance and suicidal ideas. The patient is nervous/anxious. The patient is not hyperactive. OBJECTIVE:    /70   Pulse 96   Temp 98 °F (36.7 °C)   Resp 20   Ht 5' 10\" (1.778 m)   Wt 127 lb (57.6 kg)   SpO2 98%   BMI 18.22 kg/m²    Physical Exam  Vitals signs and nursing note reviewed. Constitutional:       General: He is not in acute distress. Appearance: Normal appearance. He is well-developed. He is not ill-appearing, toxic-appearing or diaphoretic. HENT:      Head: Normocephalic. Eyes:      Extraocular Movements: Extraocular movements intact. Conjunctiva/sclera: Conjunctivae normal.      Pupils: Pupils are equal, round, and reactive to light. Neck:      Thyroid: No thyromegaly. Vascular: No carotid bruit. Cardiovascular:      Rate and Rhythm: Normal rate and regular rhythm. Heart sounds: Normal heart sounds. No murmur. Pulmonary:      Effort: Pulmonary effort is normal.      Breath sounds: Normal breath sounds. Abdominal:      General: Abdomen is flat. Skin:     General: Skin is warm and dry. Coloration: Skin is not ashen, cyanotic, jaundiced, mottled, pale or sallow. Nails: There is no clubbing. Neurological:      Mental Status: He is alert and oriented to person, place, and time. Psychiatric:         Attention and Perception: Attention and perception normal. He is attentive. He does not perceive auditory or visual hallucinations. Mood and Affect: Mood is depressed. Mood is not anxious. Affect is not labile, flat, angry, tearful or inappropriate. Speech: Speech normal.         Behavior: Behavior normal. Behavior is not agitated, slowed, aggressive, withdrawn, hyperactive or combative. Behavior is cooperative. Thought Content: Thought content normal. Thought content is not paranoid or delusional. Thought content does not include homicidal or suicidal ideation. Thought content does not include homicidal or suicidal plan. Cognition and Memory: Cognition and memory normal.         Judgment: Judgment normal.         ASSESSMENT/PLAN:   Dunia Weinberg was seen today for depression and abnormal test results.     Diagnoses and all orders for this visit:    Vitamin D deficiency  -     vitamin D (ERGOCALCIFEROL) 1.25 MG (65966 UT) CAPS capsule; Take 1 capsule by mouth once a week  Repeat Vitamin D in 3 months. Elevated rheumatoid factor  -     External Referral To Rheumatology    Arthralgia, unspecified joint- referred to rheumatology for + RF    Mixed hyperlipidemia  -     simvastatin (ZOCOR) 10 MG tablet; Take 1 tablet by mouth nightly-  -low fat diet  -regular physical activity- at least 30 minutes per day 4-5 days per week  Repeat fasting lipid panel in 3 months. Smoker  -     Full PFT Study With Bronchodilator; Future    Shortness of breath  -     Full PFT Study With Bronchodilator; Future        Return in about 3 months (around 3/8/2021). Current Outpatient Medications on File Prior to Visit   Medication Sig Dispense Refill    pantoprazole (PROTONIX) 40 MG tablet TAKE ONE TABLET BY MOUTH EVERY MORNING (BEFORE BREAKFAST) 90 tablet 3    ibuprofen (IBU) 800 MG tablet Take 1 tablet by mouth every 6 hours as needed for Pain 90 tablet 5     No current facility-administered medications on file prior to visit.

## 2020-12-31 ENCOUNTER — TRANSCRIBE ORDERS (OUTPATIENT)
Dept: ADMINISTRATIVE | Facility: HOSPITAL | Age: 45
End: 2020-12-31

## 2020-12-31 DIAGNOSIS — M75.41 IMPINGEMENT SYNDROME OF RIGHT SHOULDER: ICD-10-CM

## 2020-12-31 DIAGNOSIS — M25.511 RIGHT SHOULDER PAIN, UNSPECIFIED CHRONICITY: Primary | ICD-10-CM

## 2021-01-04 ENCOUNTER — HOSPITAL ENCOUNTER (OUTPATIENT)
Dept: RESPIRATORY THERAPY | Facility: HOSPITAL | Age: 46
Discharge: HOME | End: 2021-01-04
Payer: MEDICAID

## 2021-01-04 DIAGNOSIS — F17.200 SMOKER: ICD-10-CM

## 2021-01-04 DIAGNOSIS — R06.02 SHORTNESS OF BREATH: ICD-10-CM

## 2021-01-04 LAB — SARS-COV-2, NAAT: NOT DETECTED

## 2021-01-04 PROCEDURE — U0002 COVID-19 LAB TEST NON-CDC: HCPCS

## 2021-01-04 PROCEDURE — 94375 RESPIRATORY FLOW VOLUME LOOP: CPT

## 2021-01-06 ENCOUNTER — TRANSCRIBE ORDERS (OUTPATIENT)
Dept: ADMINISTRATIVE | Facility: HOSPITAL | Age: 46
End: 2021-01-06

## 2021-01-06 DIAGNOSIS — M25.511 RIGHT SHOULDER PAIN, UNSPECIFIED CHRONICITY: Primary | ICD-10-CM

## 2021-01-06 DIAGNOSIS — M75.41 IMPINGEMENT SYNDROME OF RIGHT SHOULDER: ICD-10-CM

## 2021-01-08 ENCOUNTER — TELEPHONE (OUTPATIENT)
Dept: FAMILY MEDICINE CLINIC | Age: 46
End: 2021-01-08

## 2021-01-08 NOTE — TELEPHONE ENCOUNTER
Patient's referral, along with all pertinent medical records faxed to the attention of Dr. Libby Gustafson (Rheumatology) on 01/08/21, they will contact the patient and our office with appointment date/time/location.

## 2021-01-21 ENCOUNTER — VIRTUAL VISIT (OUTPATIENT)
Dept: FAMILY MEDICINE CLINIC | Age: 46
End: 2021-01-21
Payer: MEDICAID

## 2021-01-21 DIAGNOSIS — J98.4 RESTRICTIVE LUNG DISEASE: Primary | ICD-10-CM

## 2021-01-21 PROCEDURE — 99441 PR PHYS/QHP TELEPHONE EVALUATION 5-10 MIN: CPT | Performed by: NURSE PRACTITIONER

## 2021-01-21 RX ORDER — ALBUTEROL SULFATE 90 UG/1
2 AEROSOL, METERED RESPIRATORY (INHALATION) 4 TIMES DAILY PRN
Qty: 1 INHALER | Refills: 5 | Status: SHIPPED | OUTPATIENT
Start: 2021-01-21

## 2021-01-21 NOTE — PROGRESS NOTES
Derrick Frausto is a 39 y.o. male evaluated via telephone on 1/21/2021. Consent:  He and/or health care decision maker is aware that that he may receive a bill for this telephone service, depending on his insurance coverage, and has provided verbal consent to proceed: Yes      Documentation:  I communicated with the patient and/or health care decision maker about results of PFT. Details of this discussion including any medical advice provided:    Pt presents to review PFT results. Pt advised his PFT results showed mild airway restriction without signs of airway obstruction. He was encouraged to stop smoking. Pt verbalized understanding. Pt has been evaluated by Dr Colonel Conway for + Rheumatoid factor. Pt reports additional labs were ordered by Dr Colonel Conway. He was started on prednisone 10mg daily for 2 weeks and was told to follow up in 2 weeks. Pt reports mild improvement in arthralgia. Pt missed appt with [facility] but agrees to resechedule. Linda Baldwin was seen today for abnormal test results. Diagnoses and all orders for this visit:    Restrictive lung disease  -     albuterol sulfate HFA (VENTOLIN HFA) 108 (90 Base) MCG/ACT inhaler; Inhale 2 puffs into the lungs 4 times daily as needed for Wheezing  -I have encouraged smoking cessation. Return in about 1 month (around 2/21/2021) for fasting labs. I affirm this is a Patient Initiated Episode with a Patient who has not had a related appointment within my department in the past 7 days or scheduled within the next 24 hours.     Patient identification was verified at the start of the visit: Yes    Total Time: minutes: 5-10 minutes    Note: not billable if this call serves to triage the patient into an appointment for the relevant concern      Bao Aleman

## 2021-01-28 ENCOUNTER — HOSPITAL ENCOUNTER (OUTPATIENT)
Dept: GENERAL RADIOLOGY | Facility: HOSPITAL | Age: 46
Discharge: HOME | End: 2021-01-28

## 2021-01-28 ENCOUNTER — HOSPITAL ENCOUNTER (OUTPATIENT)
Dept: MRI IMAGING | Facility: HOSPITAL | Age: 46
Discharge: HOME | End: 2021-01-28

## 2021-01-28 DIAGNOSIS — M75.41 IMPINGEMENT SYNDROME OF RIGHT SHOULDER: ICD-10-CM

## 2021-01-28 DIAGNOSIS — M25.511 RIGHT SHOULDER PAIN, UNSPECIFIED CHRONICITY: ICD-10-CM

## 2021-01-28 PROCEDURE — 25010000002 IOPAMIDOL 61 % SOLUTION: Performed by: ORTHOPAEDIC SURGERY

## 2021-01-28 PROCEDURE — 77002 NEEDLE LOCALIZATION BY XRAY: CPT

## 2021-01-28 PROCEDURE — A9577 INJ MULTIHANCE: HCPCS | Performed by: ORTHOPAEDIC SURGERY

## 2021-01-28 PROCEDURE — 0 GADOBENATE DIMEGLUMINE 529 MG/ML SOLUTION: Performed by: ORTHOPAEDIC SURGERY

## 2021-01-28 PROCEDURE — 25010000003 LIDOCAINE 1 % SOLUTION: Performed by: ORTHOPAEDIC SURGERY

## 2021-01-28 PROCEDURE — 73222 MRI JOINT UPR EXTREM W/DYE: CPT

## 2021-01-28 RX ORDER — LIDOCAINE HYDROCHLORIDE 10 MG/ML
20 INJECTION, SOLUTION INFILTRATION; PERINEURAL ONCE
Status: COMPLETED | OUTPATIENT
Start: 2021-01-28 | End: 2021-01-28

## 2021-01-28 RX ADMIN — GADOBENATE DIMEGLUMINE 1 ML: 529 INJECTION, SOLUTION INTRAVENOUS at 12:53

## 2021-01-28 RX ADMIN — IOPAMIDOL 10 ML: 612 INJECTION, SOLUTION INTRAVENOUS at 12:55

## 2021-01-28 RX ADMIN — LIDOCAINE HYDROCHLORIDE 15 ML: 10 INJECTION, SOLUTION INFILTRATION; PERINEURAL at 12:55

## 2021-03-03 ENCOUNTER — TELEMEDICINE (OUTPATIENT)
Dept: FAMILY MEDICINE CLINIC | Age: 46
End: 2021-03-03
Payer: MEDICAID

## 2021-03-03 DIAGNOSIS — M79.2 NERVE PAIN: ICD-10-CM

## 2021-03-03 DIAGNOSIS — K21.9 GASTROESOPHAGEAL REFLUX DISEASE WITHOUT ESOPHAGITIS: ICD-10-CM

## 2021-03-03 DIAGNOSIS — M79.601 RIGHT ARM PAIN: ICD-10-CM

## 2021-03-03 DIAGNOSIS — M54.2 NECK PAIN: Primary | ICD-10-CM

## 2021-03-03 DIAGNOSIS — M25.50 MULTIPLE JOINT PAIN: ICD-10-CM

## 2021-03-03 DIAGNOSIS — E55.9 VITAMIN D DEFICIENCY: ICD-10-CM

## 2021-03-03 PROCEDURE — 99213 OFFICE O/P EST LOW 20 MIN: CPT | Performed by: NURSE PRACTITIONER

## 2021-03-03 RX ORDER — IBUPROFEN 800 MG/1
800 TABLET ORAL EVERY 6 HOURS PRN
Qty: 90 TABLET | Refills: 5 | Status: SHIPPED | OUTPATIENT
Start: 2021-03-03

## 2021-03-03 RX ORDER — ERGOCALCIFEROL 1.25 MG/1
50000 CAPSULE ORAL WEEKLY
Qty: 4 CAPSULE | Refills: 5 | Status: SHIPPED | OUTPATIENT
Start: 2021-03-03 | End: 2022-05-08

## 2021-03-03 RX ORDER — PANTOPRAZOLE SODIUM 40 MG/1
TABLET, DELAYED RELEASE ORAL
Qty: 90 TABLET | Refills: 3 | Status: SHIPPED | OUTPATIENT
Start: 2021-03-03

## 2021-03-03 ASSESSMENT — ENCOUNTER SYMPTOMS
RESPIRATORY NEGATIVE: 1
GASTROINTESTINAL NEGATIVE: 1
EYES NEGATIVE: 1
BACK PAIN: 0
ALLERGIC/IMMUNOLOGIC NEGATIVE: 1

## 2021-03-03 NOTE — PROGRESS NOTES
3/3/2021    TELEHEALTH EVALUATION -- Audio/Visual (During GMPUE-14 public health emergency)      Magdalene Mac (:  1975) has requested an audio/video evaluation for the following concern(s):    Pt reports he was recently evaluated by Dr Brian Coyle for right shoulder pain s/p rotator cuff repair last year. Dr Naila Paez office note reports a stable high-grade partial thickness rotator cuff tear on the articular surface of the supraspinatus. It also states that he was unable to be consistent with Physical therapy during his post operative period due to coronavirus outbreak. He recommended restarting physical therapy with an emphasis on strengthening. Pt declined physical therapy at this time due to \"possible nerve damage\". Pt c/o electrical shock sensations radiating down right shoulder. Symptoms developed 2-3 months after rotator cuff repair when a dog on a leash pulled his arm behind him. Pt reports symptoms have been progressively getting worse. Pt reports he was told he needed to follow up with PCP for referral to neurology or neurosurgery. Pt reports he has an electrical shock sensation down right arm all the time. Pt had MRI of Right shoulder in 2020 at [facility] ordered by Dr Brian Coyle. Denies numbness. Pt c/o right sided neck pain worse with movement. Electrical shock sensation get worse with movement of neck and right arm. Pt reports he does not use his right arm to avoid worsening symptoms. Review of Systems   Constitutional: Negative. HENT: Negative. Eyes: Negative. Respiratory: Negative. Cardiovascular: Negative. Negative for chest pain. Gastrointestinal: Negative. Endocrine: Negative. Genitourinary: Negative. Musculoskeletal: Positive for arthralgias and neck pain (radiating to right side. ). Negative for back pain, gait problem, joint swelling, myalgias and neck stiffness. Skin: Negative. Allergic/Immunologic: Negative. Neurological: Negative. Electrical shock sensation right arm   Hematological: Negative. Psychiatric/Behavioral: Negative. Prior to Visit Medications    Medication Sig Taking? Authorizing Provider   ibuprofen (IBU) 800 MG tablet Take 1 tablet by mouth every 6 hours as needed for Pain Yes SEAN Guy NP   pantoprazole (PROTONIX) 40 MG tablet TAKE ONE TABLET BY MOUTH EVERY MORNING (BEFORE BREAKFAST) Yes SEAN Guy NP   vitamin D (ERGOCALCIFEROL) 1.25 MG (37930 UT) CAPS capsule Take 1 capsule by mouth once a week Yes SEAN Guy NP   albuterol sulfate HFA (VENTOLIN HFA) 108 (90 Base) MCG/ACT inhaler Inhale 2 puffs into the lungs 4 times daily as needed for Wheezing  SEAN Guy NP   simvastatin (ZOCOR) 10 MG tablet Take 1 tablet by mouth nightly  SEAN Guy NP       Social History     Tobacco Use    Smoking status: Current Every Day Smoker     Packs/day: 0.50     Years: 29.00     Pack years: 14.50     Types: Cigarettes    Smokeless tobacco: Never Used   Substance Use Topics    Alcohol use: No    Drug use: Yes     Types: Marijuana        No Known Allergies    PHYSICAL EXAMINATION:  [ INSTRUCTIONS:  \"[x]\" Indicates a positive item  \"[]\" Indicates a negative item  -- DELETE ALL ITEMS NOT EXAMINED]  Vital Signs: (As obtained by patient/caregiver or practitioner observation)    Blood pressure-  Heart rate-    Respiratory rate-    Temperature-  Pulse oximetry-     Constitutional: [x] Appears well-developed and well-nourished [x] No apparent distress      [] Abnormal-   Mental status  [x] Alert and awake  [x] Oriented to person/place/time [x]Able to follow commands      Eyes:  EOM    [x]  Normal  [] Abnormal-  Sclera  [x]  Normal  [] Abnormal -         Discharge [x]  None visible  [] Abnormal -    HENT:   [x] Normocephalic, atraumatic.   [] Abnormal   [x] Mouth/Throat: Mucous membranes are moist.     External Ears [] Normal  [] Abnormal- Neck: [x] No visualized mass - decreased ROM to right  Pulmonary/Chest: [x] Respiratory effort normal.  [] No visualized signs of difficulty breathing or respiratory distress        [] Abnormal-      Musculoskeletal:   [] Normal gait with no signs of ataxia         [] Normal range of motion of neck        [x] Abnormal- pt is sitting in vehicle. Pt appears to have decreased ROM to the right       Neurological:        [x] No Facial Asymmetry (Cranial nerve 7 motor function) (limited exam to video visit)          [x] No gaze palsy        [] Abnormal-         Skin:        [x] No significant exanthematous lesions or discoloration noted on facial skin         [] Abnormal-            Psychiatric:       [x] Normal Affect [] No Hallucinations        [] Abnormal-     Other pertinent observable physical exam findings-     ASSESSMENT/PLAN:  Roc Solano was seen today for arm pain. Diagnoses and all orders for this visit:    Neck pain  -     MRI Cervical Spine WO Contrast; Future    Multiple joint pain  -     ibuprofen (IBU) 800 MG tablet; Take 1 tablet by mouth every 6 hours as needed for Pain    Gastroesophageal reflux disease without esophagitis  -     pantoprazole (PROTONIX) 40 MG tablet; TAKE ONE TABLET BY MOUTH EVERY MORNING (BEFORE BREAKFAST)    Vitamin D deficiency  -     vitamin D (ERGOCALCIFEROL) 1.25 MG (17686 UT) CAPS capsule; Take 1 capsule by mouth once a week    Nerve pain  -     MRI Cervical Spine WO Contrast; Future    Right arm pain  -     MRI Cervical Spine WO Contrast; Future    Requested office note from Dr Naila Paez office. Return if symptoms worsen or fail to improve, for pt advised to follow up after MRI is completed for results and referral to Neurology/neurosurgery. Clemente Birmingham, was evaluated through a synchronous (real-time) audio-video encounter. The patient (or guardian if applicable) is aware that this is a billable service. Verbal consent to proceed has been obtained within the past 12 months. The visit was conducted pursuant to the emergency declaration under the [address] [place] [address] and the [employer] General Act. Patient identification was verified, and a caregiver was present when appropriate. The patient was located in a state where the provider was credentialed to provide care. Total time spent on this encounter: 20 minutes    --SEAN Ramirez NP on 3/4/2021 at 2:09 PM    An electronic signature was used to authenticate this note.

## 2021-03-12 ENCOUNTER — HOSPITAL ENCOUNTER (EMERGENCY)
Facility: HOSPITAL | Age: 46
Discharge: HOME | End: 2021-03-12
Attending: HOSPITALIST
Payer: MEDICAID

## 2021-03-12 VITALS
TEMPERATURE: 98.1 F | WEIGHT: 130 LBS | OXYGEN SATURATION: 98 % | DIASTOLIC BLOOD PRESSURE: 68 MMHG | HEART RATE: 74 BPM | BODY MASS INDEX: 18.61 KG/M2 | RESPIRATION RATE: 16 BRPM | HEIGHT: 70 IN | SYSTOLIC BLOOD PRESSURE: 101 MMHG

## 2021-03-12 DIAGNOSIS — M53.3 PAIN OF LEFT SACROILIAC JOINT: Primary | ICD-10-CM

## 2021-03-12 PROCEDURE — 96372 THER/PROPH/DIAG INJ SC/IM: CPT

## 2021-03-12 PROCEDURE — 6360000002 HC RX W HCPCS: Performed by: HOSPITALIST

## 2021-03-12 PROCEDURE — 99282 EMERGENCY DEPT VISIT SF MDM: CPT

## 2021-03-12 RX ORDER — TIZANIDINE 4 MG/1
4 TABLET ORAL 2 TIMES DAILY PRN
COMMUNITY

## 2021-03-12 RX ORDER — DEXAMETHASONE SODIUM PHOSPHATE 10 MG/ML
10 INJECTION INTRAMUSCULAR; INTRAVENOUS ONCE
Status: COMPLETED | OUTPATIENT
Start: 2021-03-12 | End: 2021-03-12

## 2021-03-12 RX ADMIN — HYDROMORPHONE HYDROCHLORIDE 1 MG: 1 INJECTION, SOLUTION INTRAMUSCULAR; INTRAVENOUS; SUBCUTANEOUS at 12:30

## 2021-03-12 RX ADMIN — DEXAMETHASONE SODIUM PHOSPHATE 10 MG: 10 INJECTION INTRAMUSCULAR; INTRAVENOUS at 12:30

## 2021-03-12 ASSESSMENT — PAIN DESCRIPTION - FREQUENCY: FREQUENCY: CONTINUOUS

## 2021-03-12 ASSESSMENT — PAIN DESCRIPTION - DESCRIPTORS: DESCRIPTORS: NUMBNESS;ACHING

## 2021-03-12 NOTE — ED NOTES
Pt c/o left side lower back pain since last night.  Pain radiates to hip and leg     Michael Martinez RN  03/12/21 3762

## 2021-03-12 NOTE — ED NOTES
Discharge instructions reviewed with pt and understanding verbalized no further needs or concerns at this time. Pt ambulated out of ED without difficulty.       Norm Limon RN  03/12/21 6176

## 2021-03-12 NOTE — ED PROVIDER NOTES
no chills, no weakness  Cardiovascular:  No chest pain, no palpitations  Respiratory:  No shortness of breath, no cough, no wheezing  Gastrointestinal:  No pain, no nausea, no vomiting, no diarrhea  Musculoskeletal:  +left low back pain, +left hip pain  Skin:  No rash, no easy bruising  Neurologic:  No speech problems, no headache, no extremity weakness  Psychiatric:  No anxiety  Genitourinary:  No dysuria, no hematuria    Except as noted above the remainder of the review of systems was reviewed and negative. PAST MEDICAL HISTORY     Past Medical History:   Diagnosis Date    Hepatitis C     Hepatitis C     Hypoglycemia          SURGICAL HISTORY       Past Surgical History:   Procedure Laterality Date    MOUTH SURGERY      ROTATOR CUFF REPAIR      UPPER GASTROINTESTINAL ENDOSCOPY N/A 6/10/2019    EGD BIOPSY performed by Morgan Mcgill MD at [address] [place] [address]       Previous Medications    ALBUTEROL SULFATE HFA (VENTOLIN HFA) 108 (90 BASE) MCG/ACT INHALER    Inhale 2 puffs into the lungs 4 times daily as needed for Wheezing    IBUPROFEN (IBU) 800 MG TABLET    Take 1 tablet by mouth every 6 hours as needed for Pain    PANTOPRAZOLE (PROTONIX) 40 MG TABLET    TAKE ONE TABLET BY MOUTH EVERY MORNING (BEFORE BREAKFAST)    SIMVASTATIN (ZOCOR) 10 MG TABLET    Take 1 tablet by mouth nightly    TIZANIDINE (ZANAFLEX) 4 MG TABLET    Take 4 mg by mouth every 6 hours as needed    VITAMIN D (ERGOCALCIFEROL) 1.25 MG (90694 UT) CAPS CAPSULE    Take 1 capsule by mouth once a week       ALLERGIES     Patient has no known allergies. FAMILY HISTORY     History reviewed. No pertinent family history.        SOCIAL HISTORY       Social History     Socioeconomic History    Marital status: Single     Spouse name: None    Number of children: None    Years of education: None    Highest education level: None   Occupational History    None   Social Needs    Financial resource strain: None   [place] consciousness is normal  Dermatology: Skin is warm and dry  Psych: Mentation is grossly normal, cognition is grossly normal. Affect is appropriate. BACK: There is not thoracic or lumbar midline tenderness to palpation or step-offs. Paraspinal tenderness to palpation is not present in the facet or lumbar region. No overlying rashes. LE strength is 5/5. LE light touch is intact. LE DTR's are 2+ in the patellas and achilles. Straight leg test is negative on the RIGHT, negative on the LEFT. Patient does have palpable point tenderness over the SI joint which reproduces all of the patient's symptoms and discomfort. DIAGNOSTIC RESULTS     EKG: All EKG's are interpreted by the Emergency Department Physician who either signs or Co-signs this chart in the [address] a cardiologist.        RADIOLOGY:   Non-plain film images such as CT, Ultrasound and MRI are read by the radiologist. Plain radiographic images are visualized and preliminarily interpreted by the emergency physician with the below findings:      ? Radiologist's Report Reviewed:  No orders to display         ED BEDSIDE ULTRASOUND:   Performed by ED Physician - none    LABS:    I have reviewed and interpreted all of the currently available lab results from this visit (ifapplicable):  No results found for this visit on 03/12/21. All other labs were within normal range or not returned as of this dictation.     EMERGENCY DEPARTMENT COURSE and DIFFERENTIAL DIAGNOSIS/MDM:   Vitals:    Vitals:    03/12/21 1219 03/12/21 1220   BP: 119/61    Pulse: 104    Resp: 17    Temp:  98.1 °F (36.7 °C)   TempSrc:  Oral   SpO2: 98%    Weight: 130 lb (59 kg)    Height: 5' 10\" (1.778 m)        MEDICATIONS ADMINISTERED IN ED:  Medications   HYDROmorphone (DILAUDID) injection 1 mg (has no administration in time range)   dexamethasone (DECADRON) injection 10 mg (has no administration in time range)       After initial evaluation and examination I did have a conversation with the patient about the upcoming plan, treatment and possible disposition which they were agreeable to the times dictation. Patient advised that we would give him an injection of Dilaudid 1 mg IM here in hopes that that will bring his pain level down enough that his continued use of Motrin at home will help keep his pain controlled. Since he is having some numbness to the lateral aspect of the left hip we will also give him a shot of Decadron. Patient advised since that is a longer acting a steroid I do not believe he requires any oral steroid course for this. Advised that I would not be able to write him for any narcotic pain medication since is a nontraumatic back injury. Patient advised to continue with his Motrin and Zanaflex at home which he is already prescribed. He was offered more of this medication if needed but declined states he has plenty of it. Patient advised he is not able to drive after the injection but states he does have someone here with him who can drive him home. Patient will be discharged home in stable condition after appropriate shot time is . Radiograph of the area not warranted send there is no direct injury or trauma it is more just a hyperextension type dysfunction/mechanism. The patient advised that he does need to follow-up with his regular family physician within the next 1 to 2 days reevaluation. Patient was also given instructions if the symptoms worsens or new symptoms arise he should return back to the emergency department for further evaluation work-up. CONSULTS:  None    PROCEDURES:  Procedures    CRITICAL CARE TIME    Total Critical Care time was 0 minutes, excluding separately reportable procedures. There was a high probability of clinically significant/life threatening deterioration in the patient's condition which required my urgent intervention. FINAL IMPRESSION      1.  Pain of left sacroiliac joint          DISPOSITION/PLAN   DISPOSITION PATIENT REFERRED TO:  Your PCP  1-2 days for re-evaluation        Alec Saleh. [facility]  [phone]    As needed, If symptoms worsen      DISCHARGE MEDICATIONS:  New Prescriptions    No medications on file       Comment: Please note this report has been produced using speech recognition software and may contain errorsrelated to that system including errors in grammar, punctuation, and spelling, as well as words and phrases that may be inappropriate. If there are any questions or concerns please feel free to contact the dictating providerfor clarification.     Nikki Rogers DO  Attending Emergency Physician              Nikki Rogers DO  03/12/21 7169

## 2021-03-15 ENCOUNTER — HOSPITAL ENCOUNTER (OUTPATIENT)
Dept: MRI IMAGING | Facility: HOSPITAL | Age: 46
Discharge: HOME | End: 2021-03-15
Payer: MEDICAID

## 2021-03-15 ENCOUNTER — HOSPITAL ENCOUNTER (EMERGENCY)
Facility: HOSPITAL | Age: 46
Discharge: HOME | End: 2021-03-15
Attending: HOSPITALIST
Payer: MEDICAID

## 2021-03-15 VITALS
WEIGHT: 125 LBS | BODY MASS INDEX: 17.9 KG/M2 | TEMPERATURE: 97.3 F | HEART RATE: 76 BPM | RESPIRATION RATE: 16 BRPM | DIASTOLIC BLOOD PRESSURE: 64 MMHG | OXYGEN SATURATION: 98 % | HEIGHT: 70 IN | SYSTOLIC BLOOD PRESSURE: 100 MMHG

## 2021-03-15 DIAGNOSIS — M79.2 NERVE PAIN: ICD-10-CM

## 2021-03-15 DIAGNOSIS — M53.3 PAIN OF LEFT SACROILIAC JOINT: Primary | ICD-10-CM

## 2021-03-15 DIAGNOSIS — M54.2 NECK PAIN: ICD-10-CM

## 2021-03-15 DIAGNOSIS — M79.601 RIGHT ARM PAIN: ICD-10-CM

## 2021-03-15 PROCEDURE — 72141 MRI NECK SPINE W/O DYE: CPT

## 2021-03-15 PROCEDURE — 96372 THER/PROPH/DIAG INJ SC/IM: CPT

## 2021-03-15 PROCEDURE — 6360000002 HC RX W HCPCS: Performed by: HOSPITALIST

## 2021-03-15 PROCEDURE — 99282 EMERGENCY DEPT VISIT SF MDM: CPT

## 2021-03-15 RX ADMIN — HYDROMORPHONE HYDROCHLORIDE 1 MG: 1 INJECTION, SOLUTION INTRAMUSCULAR; INTRAVENOUS; SUBCUTANEOUS at 10:27

## 2021-03-15 ASSESSMENT — PAIN - FUNCTIONAL ASSESSMENT: PAIN_FUNCTIONAL_ASSESSMENT: 0-10

## 2021-03-15 ASSESSMENT — PAIN DESCRIPTION - FREQUENCY
FREQUENCY: CONTINUOUS
FREQUENCY: CONTINUOUS

## 2021-03-15 ASSESSMENT — PAIN SCALES - GENERAL: PAINLEVEL_OUTOF10: 9

## 2021-03-15 ASSESSMENT — PAIN DESCRIPTION - LOCATION: LOCATION: BACK;LEG

## 2021-03-15 ASSESSMENT — PAIN DESCRIPTION - PAIN TYPE: TYPE: ACUTE PAIN

## 2021-03-15 NOTE — ED PROVIDER NOTES
[address] [place] [address] ENCOUNTER      Pt Name: Clemente Birmingham  MRN: [id number]  YOB: 1975  Date of evaluation: 3/15/2021  Provider: Norma Khan       Chief Complaint   Patient presents with    Back Pain    Leg Pain         HISTORY OF PRESENT ILLNESS  (Location/Symptom, Timing/Onset, Context/Setting, Quality, Duration, Modifying Factors, Severity.)   Clemente Birmingham is a 39 y.o. male who presents to the emergency department for lower back and hip pain. Patient was actually evaluated here on 3/12/2021 for the exact same complaints. At that time he stated that the symptoms started the day before on 3/11/2021. He has not followed up with his regular family physician since then but patient was here having a MRI of his neck performed for different reasons and then decided after it was performed that he would come here to the emergency department for reevaluation for pain. Patient's initial injury was caused because he was trying to move a [employer] which was too big for him and the other individual to move on their own. Patient states that when they were attempting to put it in the back of the truck the other individual lifted his end up an account of tilted slightly and he was placed in more of a hyper extension type position. Patient denies any numbness tingling weakness of the extremities out of ordinary. He denies any new reinjury or fall since then. Denies any loss of bowel or bladder control. Patient states the pain and points to his left SI area and then comes down around to the lateral aspect of his hip over his greater trochanter weeks where he points or moves his hand to. Patient states that when the injury first happened he felt like a popping sensation and has had pain since then.   Patient states that the treatment that he received the other day with the injection did help he was able to sleep that evening but has continued to have pain to the area. He has been taking high-dose anti-inflammatory medication and muscle relaxers. No new symptoms today just can continued pain from his previous injury. Patient is able to ambulate on the extremity. Nursing notes were reviewed. REVIEW OFSYSTEMS    (2-9 systems for level 4, 10 or more for level 5)   ROS:  General:  No fevers, no chills, no weakness  Cardiovascular:  No chest pain, no palpitations  Respiratory:  No shortness of breath, no cough, no wheezing  Gastrointestinal:  No pain, no nausea, no vomiting, no diarrhea  Musculoskeletal:  +left SI/back pain, no joint pain  Skin:  No rash, no easy bruising  Neurologic:  No speech problems, no headache, no extremity weakness  Psychiatric:  No anxiety  Genitourinary:  No dysuria, no hematuria    Except as noted above the remainder of the review of systems was reviewed and negative. PAST MEDICAL HISTORY     Past Medical History:   Diagnosis Date    Hepatitis C     Hepatitis C     Hypoglycemia          SURGICAL HISTORY       Past Surgical History:   Procedure Laterality Date    MOUTH SURGERY      ROTATOR CUFF REPAIR      UPPER GASTROINTESTINAL ENDOSCOPY N/A 6/10/2019    EGD BIOPSY performed by Linda Rodriguez MD at [address] [place] [address]       Previous Medications    ALBUTEROL SULFATE HFA (VENTOLIN HFA) 108 (90 BASE) MCG/ACT INHALER    Inhale 2 puffs into the lungs 4 times daily as needed for Wheezing    IBUPROFEN (IBU) 800 MG TABLET    Take 1 tablet by mouth every 6 hours as needed for Pain    PANTOPRAZOLE (PROTONIX) 40 MG TABLET    TAKE ONE TABLET BY MOUTH EVERY MORNING (BEFORE BREAKFAST)    TIZANIDINE (ZANAFLEX) 4 MG TABLET    Take 4 mg by mouth every 6 hours as needed    VITAMIN D (ERGOCALCIFEROL) 1.25 MG (79852 UT) CAPS CAPSULE    Take 1 capsule by mouth once a week       ALLERGIES     Patient has no known allergies. FAMILY HISTORY     History reviewed. No pertinent family history.        SOCIAL HISTORY       Social History     Socioeconomic History    Marital status: Single     Spouse name: None    Number of children: None    Years of education: None    Highest education level: None   Occupational History    None   Social Needs    Financial resource strain: None    Food insecurity     Worry: None     Inability: None    Transportation needs     Medical: None     Non-medical: None   Tobacco Use    Smoking status: Current Every Day Smoker     Packs/day: 0.50     Years: 29.00     Pack years: 14.50     Types: Cigarettes    Smokeless tobacco: Never Used   Substance and Sexual Activity    Alcohol use: No    Drug use: Yes     Types: Marijuana    Sexual activity: None   Lifestyle    Physical activity     Days per week: None     Minutes per session: None    Stress: None   Relationships    Social connections     Talks on phone: None     Gets together: None     Attends Taoist service: None     Active member of club or organization: None     Attends meetings of clubs or organizations: None     Relationship status: None    Intimate partner violence     Fear of current or ex partner: None     Emotionally abused: None     Physically abused: None     Forced sexual activity: None   Other Topics Concern    None   Social History Narrative    None         PHYSICAL EXAM    (up to 7 for level 4, 8 or more for level 5)     ED Triage Vitals   BP Temp Temp src Pulse Resp SpO2 Height Weight   -- -- -- -- -- -- -- --       Physical Exam  General :Patient is awake, alert, oriented, in no acute distress, nontoxic appearing  HEENT: Pupils are equally round and reactive to light, EOMI, conjunctivae clear. Oral mucosa is moist, no exudate. Uvula is midline  Cardiac: Heart regular rate, rhythm, no murmurs, rubs, or gallops  Lungs: Lungs are clear to auscultation, there is no wheezing, rhonchi, or rales. There is no use of accessory muscles. Abdomen: Abdomen is soft, nontender, nondistended.  There is no firm or pulsatile masses, no rebound rigidity or guarding. Musculoskeletal: 5 out of 5 strength in all 4 extremities. No focal muscle deficits are appreciated  Neuro: Motor intact, sensory intact, level of consciousness is normal, cerebellar function is normal, reflexes are grossly normal.   Dermatology: Skin is warm and dry  Psych: Mentation is grossly normal, cognition is grossly normal. Affect is appropriate. BACK: There is not thoracic or lumbar midline tenderness to palpation or step-offs. Paraspinal tenderness to palpation is not present in the lumbar or thoracic region region. No overlying rashes. LE strength is 5/5. LE light touch is intact. LE DTR's are 2+ in the patellas and achilles. Straight leg test is negative on the RIGHT, negative on the LEFT. Palpable tenderness over the SI joint on the left. I went patient is forced to move flexor perform any activity with the left he does seem to be in excruciating pain but does not place any more pressure on the right side to alleviate or get off of that injured side. Mild scoliosis noted of the spine      DIAGNOSTIC RESULTS     EKG: All EKG's are interpreted by the Emergency Department Physician who either signs or Co-signs this chart in the [address] a cardiologist.        RADIOLOGY:   Non-plain film images such as CT, Ultrasound and MRI are read by the radiologist. Plain radiographic images are visualized and preliminarily interpreted by the emergency physician with the below findings:      ? Radiologist's Report Reviewed:  No orders to display         ED BEDSIDE ULTRASOUND:   Performed by ED Physician - none    LABS:    I have reviewed and interpreted all of the currently available lab results from this visit (ifapplicable):  No results found for this visit on 03/15/21. All other labs were within normal range or not returned as of this dictation.     EMERGENCY DEPARTMENT COURSE and DIFFERENTIAL DIAGNOSIS/MDM:   Vitals:    Vitals:    03/15/21 1012   BP: return back to the emergency department for further evaluation work-up. CONSULTS:  None    PROCEDURES:  Procedures    CRITICAL CARE TIME    Total Critical Care time was 0 minutes, excluding separately reportable procedures. There was a high probability of clinically significant/life threatening deterioration in the patient's condition which required my urgent intervention. FINAL IMPRESSION      1. Pain of left sacroiliac joint          DISPOSITION/PLAN   DISPOSITION        PATIENT REFERRED TO:  SEAN Singleton NP  [address] [address]  [phone]    In 2 days      [facility] Emergency Department  [facility] [address] [facility]  [phone]    As needed, If symptoms worsen      DISCHARGE MEDICATIONS:  New Prescriptions    No medications on file       Comment: Please note this report has been produced using speech recognition software and may contain errorsrelated to that system including errors in grammar, punctuation, and spelling, as well as words and phrases that may be inappropriate. If there are any questions or concerns please feel free to contact the dictating providerfor clarification.     Blaine Brasher DO  Attending Emergency Physician              Blaine Brasher DO  03/15/21 1000

## 2021-03-15 NOTE — ED NOTES
Reviewed discharge plan with Gerald Gibbs. Encouraged him to f/u with jessica humphries and he understood. NAD noted on discharge, gait steady.        Electronically signed by Grover Cohen RN on 3/15/2021 at 11:10 AM     Grover Cohen RN  03/15/21 6298

## 2021-03-16 ENCOUNTER — TELEMEDICINE (OUTPATIENT)
Dept: FAMILY MEDICINE CLINIC | Age: 46
End: 2021-03-16
Payer: MEDICAID

## 2021-03-16 DIAGNOSIS — M47.812 CERVICAL SPONDYLOSIS: ICD-10-CM

## 2021-03-16 DIAGNOSIS — M48.00 CENTRAL STENOSIS OF SPINAL CANAL: ICD-10-CM

## 2021-03-16 DIAGNOSIS — M54.42 ACUTE MIDLINE LOW BACK PAIN WITH LEFT-SIDED SCIATICA: Primary | ICD-10-CM

## 2021-03-16 DIAGNOSIS — R20.0 LEFT LEG NUMBNESS: ICD-10-CM

## 2021-03-16 DIAGNOSIS — M50.20 CERVICAL DISC HERNIATION: ICD-10-CM

## 2021-03-16 PROCEDURE — 99212 OFFICE O/P EST SF 10 MIN: CPT | Performed by: NURSE PRACTITIONER

## 2021-03-16 RX ORDER — PREDNISONE 10 MG/1
TABLET ORAL
Qty: 1 EACH | Refills: 0 | Status: SHIPPED | OUTPATIENT
Start: 2021-03-16 | End: 2022-05-08

## 2021-03-16 RX ORDER — HYDROCODONE BITARTRATE AND ACETAMINOPHEN 7.5; 325 MG/1; MG/1
1 TABLET ORAL EVERY 6 HOURS PRN
Qty: 12 TABLET | Refills: 0 | Status: SHIPPED | OUTPATIENT
Start: 2021-03-16 | End: 2021-03-19

## 2021-03-16 ASSESSMENT — ENCOUNTER SYMPTOMS
COUGH: 0
RESPIRATORY NEGATIVE: 1
EYES NEGATIVE: 1
GASTROINTESTINAL NEGATIVE: 1
VOMITING: 0
SHORTNESS OF BREATH: 0
BACK PAIN: 1
DIARRHEA: 0
BOWEL INCONTINENCE: 0
ABDOMINAL PAIN: 0
NAUSEA: 0

## 2021-03-16 NOTE — PROGRESS NOTES
3/16/2021    TELEHEALTH EVALUATION -- Audio/Visual (During MBYCK-47 public health emergency)    HPI:    Tom Liao (:  1975) has requested an audio/video evaluation for the following concern(s):    Pt reports he was moving a hutch. He was lifting a hutch into the back of a truck with another person and all the weight came back on him on 2021. Back Pain  This is a new problem. The current episode started in the past 7 days. The problem occurs constantly. The problem is unchanged. The pain is present in the lumbar spine. The quality of the pain is described as stabbing. The pain radiates to the left knee. The pain is at a severity of 8/10. The pain is severe. The pain is the same all the time. The symptoms are aggravated by sitting, bending and twisting. Associated symptoms include leg pain (left leg to knee) and numbness (left thigh and groin). Pertinent negatives include no abdominal pain, bladder incontinence, bowel incontinence, chest pain, dysuria or headaches. (Numbness outside of left leg from groin to knee. )     Pt also request to review MRI results of cervical spine. Pt continues to c/o electrical shock sensation right shoulder. MRI results below. EXAM: MRI CERVICAL SPINE WO CONTRAST       INDICATION: Nerve pain, Neck pain, Right arm pain       COMPARISON: Cervical CT from 2015.       TECHNIQUE: Multiplanar, multisequence MR imaging of the cervical spine obtained without contrast.   IV contrast: None.       FINDINGS:       BRAINSTEM/SPINAL CORD: No pathologic signal.       CEREBELLAR TONSILS: Normal.        ALIGNMENT: The craniovertebral and cervical spine alignment are normal.       BONES: Normal.       NECK SOFT TISSUES: Normal.       C2-C3: Normal.       C3-C4: Normal.       C4-C5: Bilateral uncovertebral osteophytes, left greater than right. Desiccation and mild disc bulge. No evidence of central stenosis.  Mild to moderate left foraminal stenosis.       C5-C6: Disc desiccation and diffuse disc bulge loss of disc space with endplate and uncovertebral osteophyte, right greater than left. Mixed hard and soft disc herniation in the central and right greater than left central portions of the disc. There is    mild to moderate central stenosis with AP thecal sac dimension 8 mm. There is contact of the ventral cervical cord at and to the right of midline without definite cord deformity. Moderate right greater than left foraminal stenosis.       C6-C7: Disc desiccation and diffuse disc bulge with superimposed shallow central and bilateral central disc herniation. No evidence of canal stenosis. No foraminal stenosis.       C7-T1: Normal.           Impression       1. Multilevel cervical spondylosis as described, most pronounced at C5-C6.  At this level, there is mild to moderate central stenosis related to mixed hard and soft disc herniation as well as endplate and uncovertebral osteophyte. Central and right    central portion of the disc herniation contacts the ventral cervical spinal cord without definite cord deformity. There is also moderate right greater than left foraminal stenosis at this level.             Review of Systems   Constitutional: Negative. HENT: Negative. Eyes: Negative. Respiratory: Negative. Negative for cough and shortness of breath. Cardiovascular: Negative. Negative for chest pain. Gastrointestinal: Negative. Negative for abdominal pain, bowel incontinence, diarrhea, nausea and vomiting. Genitourinary: Negative for bladder incontinence, difficulty urinating and dysuria. Musculoskeletal: Positive for arthralgias, back pain and neck pain. Skin: Negative. Neurological: Positive for numbness (left thigh and groin). Negative for headaches. Hematological: Negative. Prior to Visit Medications    Medication Sig Taking?  Authorizing Provider   predniSONE 10 MG (21) TBPK Take as directed at 5-6-4-3-2-1-stop Yes SEAN Huitron - NP   HYDROcodone-acetaminophen (NORCO) 7.5-325 MG per tablet Take 1 tablet by mouth every 6 hours as needed for Pain for up to 3 days. Intended supply: 3 days. Take lowest dose possible to manage pain Yes SEAN Hodges NP   tiZANidine (ZANAFLEX) 4 MG tablet Take 4 mg by mouth every 6 hours as needed  Historical Provider, MD   ibuprofen (IBU) 800 MG tablet Take 1 tablet by mouth every 6 hours as needed for Pain  SEAN Hodges NP   pantoprazole (PROTONIX) 40 MG tablet TAKE ONE TABLET BY MOUTH EVERY MORNING (BEFORE BREAKFAST)  SEAN Hodges NP   vitamin D (ERGOCALCIFEROL) 1.25 MG (52246 UT) CAPS capsule Take 1 capsule by mouth once a week  SEAN Hodges NP   albuterol sulfate HFA (VENTOLIN HFA) 108 (90 Base) MCG/ACT inhaler Inhale 2 puffs into the lungs 4 times daily as needed for Wheezing  SEAN Hodges NP       Social History     Tobacco Use    Smoking status: Current Every Day Smoker     Packs/day: 0.50     Years: 29.00     Pack years: 14.50     Types: Cigarettes    Smokeless tobacco: Never Used   Substance Use Topics    Alcohol use: No    Drug use: Yes     Types: Marijuana        No Known Allergies    PHYSICAL EXAMINATION:  [ INSTRUCTIONS:  \"[x]\" Indicates a positive item  \"[]\" Indicates a negative item  -- DELETE ALL ITEMS NOT EXAMINED]  Vital Signs: (As obtained by patient/caregiver or practitioner observation)    Blood pressure-  Heart rate-    Respiratory rate-    Temperature-  Pulse oximetry-     Constitutional: [x] Appears well-developed and well-nourished [x] No apparent distress   Pt is lying in bed at time of exam.  He reports pain is worse when sitting. [] Abnormal-   Mental status  [x] Alert and awake  [x] Oriented to person/place/time [x]Able to follow commands      Eyes:  EOM    [x]  Normal  [] Abnormal-  Sclera  [x]  Normal  [] Abnormal -         Discharge [x]  None visible  [] Abnormal -    HENT:   [x] Normocephalic, atraumatic.   [] Abnormal   [x] Mouth/Throat: Mucous membranes are moist.     External Ears [x] Normal  [] Abnormal-     Neck: [x] No visualized mass     Pulmonary/Chest: [x] Respiratory effort normal.  [x] No visualized signs of difficulty breathing or respiratory distress        [] Abnormal-      Musculoskeletal:   [] Normal gait with no signs of ataxia         [] Normal range of motion of neck        [] Abnormal-   Pt is lying in bed. Neurological:        [x] No Facial Asymmetry (Cranial nerve 7 motor function) (limited exam to video visit)          [x] No gaze palsy        [] Abnormal-         Skin:        [x] No significant exanthematous lesions or discoloration noted on facial skin         [] Abnormal-            Psychiatric:       [x] Normal Affect [x] No Hallucinations        [] Abnormal-     Other pertinent observable physical exam findings-     ASSESSMENT/PLAN:  1. Acute midline low back pain with left-sided sciatica-severe per patient. No improvement with Motrin 800mg TID PRN for pain.   - HYDROcodone-acetaminophen (NORCO) 7.5-325 MG per tablet; Take 1 tablet by mouth every 6 hours as needed for Pain for up to 3 days. Intended supply: 3 days. Take lowest dose possible to manage pain  Dispense: 12 tablet; Refill: 0  Controlled Substance Monitoring:    Acute and Chronic Pain Monitoring:   RX Monitoring 3/16/2021   Attestation The Prescription Monitoring Report for this patient was reviewed today. Periodic Controlled Substance Monitoring Possible medication side effects, risk of tolerance/dependence & alternative treatments discussed. ;No signs of potential drug abuse or diversion identified. ;Assessed functional status. ;Obtaining appropriate analgesic effect of treatment. ;Random urine drug screen sent today. Pt agreed to come straight to the office for UDS. When patient arrived he ambulated into the office with antalgic gait  2. Left leg numbness  - HYDROcodone-acetaminophen (NORCO) 7.5-325 MG per tablet;  Take 1 tablet by mouth every 6 hours as needed for Pain for up to 3 days. Intended supply: 3 days. Take lowest dose possible to manage pain  Dispense: 12 tablet; Refill: 0    3. Cervical spondylosis  - External Referral To Neurosurgery    4. Central stenosis of spinal canal  - External Referral To Neurosurgery    5. Cervical disc herniation  - External Referral To Neurosurgery      Return in about 1 week (around 3/23/2021), or if symptoms worsen or fail to improve. Delma Santos, was evaluated through a synchronous (real-time) audio-video encounter. The patient (or guardian if applicable) is aware that this is a billable service. Verbal consent to proceed has been obtained within the past 12 months. The visit was conducted pursuant to the emergency declaration under the [address] [place] [address], [address] [place] [address] authority and the [employer] General Act. Patient identification was verified, and a caregiver was present when appropriate. The patient was located in a state where the provider was credentialed to provide care. Total time spent on this encounter: 15 minutes    --SEAN Miller NP on 3/16/2021 at 2:20 PM    An electronic signature was used to authenticate this note.

## 2021-04-01 ENCOUNTER — TELEPHONE (OUTPATIENT)
Dept: NEUROSURGERY | Facility: CLINIC | Age: 46
End: 2021-04-01

## 2021-04-01 ENCOUNTER — OFFICE VISIT (OUTPATIENT)
Dept: NEUROSURGERY | Facility: CLINIC | Age: 46
End: 2021-04-01

## 2021-04-01 VITALS — WEIGHT: 121 LBS | TEMPERATURE: 98.2 F | HEIGHT: 69 IN | BODY MASS INDEX: 17.92 KG/M2

## 2021-04-01 DIAGNOSIS — M50.10 HERNIATION OF CERVICAL INTERVERTEBRAL DISC WITH RADICULOPATHY: Primary | ICD-10-CM

## 2021-04-01 DIAGNOSIS — M54.9 MECHANICAL BACK PAIN: ICD-10-CM

## 2021-04-01 DIAGNOSIS — F41.8 ANXIOUS DEPRESSION: ICD-10-CM

## 2021-04-01 PROCEDURE — 99204 OFFICE O/P NEW MOD 45 MIN: CPT | Performed by: NEUROLOGICAL SURGERY

## 2021-04-01 RX ORDER — HYDROCODONE BITARTRATE AND ACETAMINOPHEN 7.5; 325 MG/1; MG/1
TABLET ORAL
COMMUNITY
Start: 2021-03-16 | End: 2021-08-02

## 2021-04-01 RX ORDER — TIZANIDINE 4 MG/1
4 TABLET ORAL NIGHTLY
COMMUNITY

## 2021-04-01 RX ORDER — DICLOFENAC SODIUM 75 MG/1
75 TABLET, DELAYED RELEASE ORAL 2 TIMES DAILY
Qty: 60 TABLET | Refills: 0 | Status: SHIPPED | OUTPATIENT
Start: 2021-04-01 | End: 2021-07-22

## 2021-04-01 RX ORDER — METHYLPREDNISOLONE 4 MG/1
TABLET ORAL
Qty: 1 EACH | Refills: 0 | Status: SHIPPED | OUTPATIENT
Start: 2021-04-01 | End: 2021-07-22

## 2021-04-22 ENCOUNTER — TELEPHONE (OUTPATIENT)
Dept: NEUROSURGERY | Facility: CLINIC | Age: 46
End: 2021-04-22

## 2021-04-26 ENCOUNTER — TELEPHONE (OUTPATIENT)
Dept: NEUROSURGERY | Facility: CLINIC | Age: 46
End: 2021-04-26

## 2021-04-26 DIAGNOSIS — M54.9 MECHANICAL BACK PAIN: ICD-10-CM

## 2021-04-26 DIAGNOSIS — M50.10 HERNIATION OF CERVICAL INTERVERTEBRAL DISC WITH RADICULOPATHY: Primary | ICD-10-CM

## 2021-04-29 ENCOUNTER — HOSPITAL ENCOUNTER (OUTPATIENT)
Dept: NEUROLOGY | Facility: HOSPITAL | Age: 46
Discharge: HOME | End: 2021-04-29
Admitting: NEUROLOGICAL SURGERY

## 2021-04-29 ENCOUNTER — APPOINTMENT (OUTPATIENT)
Dept: MRI IMAGING | Facility: HOSPITAL | Age: 46
End: 2021-04-29

## 2021-04-29 DIAGNOSIS — M50.10 HERNIATION OF CERVICAL INTERVERTEBRAL DISC WITH RADICULOPATHY: ICD-10-CM

## 2021-04-29 PROCEDURE — 95910 NRV CNDJ TEST 7-8 STUDIES: CPT

## 2021-04-29 PROCEDURE — 95886 MUSC TEST DONE W/N TEST COMP: CPT

## 2021-05-24 ENCOUNTER — APPOINTMENT (OUTPATIENT)
Dept: MRI IMAGING | Facility: HOSPITAL | Age: 46
End: 2021-05-24

## 2021-07-22 ENCOUNTER — HOSPITAL ENCOUNTER (OUTPATIENT)
Dept: MRI IMAGING | Facility: HOSPITAL | Age: 46
Discharge: HOME | End: 2021-07-22

## 2021-07-22 ENCOUNTER — APPOINTMENT (OUTPATIENT)
Dept: OTHER | Facility: HOSPITAL | Age: 46
End: 2021-07-22

## 2021-07-22 ENCOUNTER — OFFICE VISIT (OUTPATIENT)
Dept: NEUROSURGERY | Facility: CLINIC | Age: 46
End: 2021-07-22

## 2021-07-22 ENCOUNTER — PREP FOR SURGERY (OUTPATIENT)
Dept: OTHER | Facility: HOSPITAL | Age: 46
End: 2021-07-22

## 2021-07-22 VITALS
BODY MASS INDEX: 18.07 KG/M2 | TEMPERATURE: 97.3 F | SYSTOLIC BLOOD PRESSURE: 126 MMHG | WEIGHT: 122 LBS | DIASTOLIC BLOOD PRESSURE: 88 MMHG | HEIGHT: 69 IN

## 2021-07-22 DIAGNOSIS — M50.10 HERNIATION OF CERVICAL INTERVERTEBRAL DISC WITH RADICULOPATHY: Primary | ICD-10-CM

## 2021-07-22 DIAGNOSIS — M50.30 DDD (DEGENERATIVE DISC DISEASE), CERVICAL: ICD-10-CM

## 2021-07-22 DIAGNOSIS — M54.2 CERVICALGIA: Primary | ICD-10-CM

## 2021-07-22 DIAGNOSIS — M51.16 LUMBAR DISC HERNIATION WITH RADICULOPATHY: ICD-10-CM

## 2021-07-22 DIAGNOSIS — M54.9 MECHANICAL BACK PAIN: ICD-10-CM

## 2021-07-22 PROBLEM — K21.00 GASTROESOPHAGEAL REFLUX DISEASE WITH ESOPHAGITIS: Status: ACTIVE | Noted: 2021-07-22

## 2021-07-22 PROBLEM — M54.12 CERVICAL RADICULOPATHY AT C6: Status: ACTIVE | Noted: 2021-07-22

## 2021-07-22 PROBLEM — K29.30 CHRONIC SUPERFICIAL GASTRITIS WITHOUT BLEEDING: Status: ACTIVE | Noted: 2021-07-22

## 2021-07-22 PROCEDURE — 99214 OFFICE O/P EST MOD 30 MIN: CPT | Performed by: NEUROLOGICAL SURGERY

## 2021-07-22 PROCEDURE — 72148 MRI LUMBAR SPINE W/O DYE: CPT

## 2021-07-22 RX ORDER — FAMOTIDINE 20 MG/1
20 TABLET, FILM COATED ORAL
Status: CANCELLED | OUTPATIENT
Start: 2021-07-22

## 2021-07-22 RX ORDER — HYDROCODONE BITARTRATE AND ACETAMINOPHEN 7.5; 325 MG/1; MG/1
1 TABLET ORAL ONCE
Status: CANCELLED | OUTPATIENT
Start: 2021-07-22 | End: 2021-07-22

## 2021-07-22 RX ORDER — SODIUM CHLORIDE, SODIUM LACTATE, POTASSIUM CHLORIDE, CALCIUM CHLORIDE 600; 310; 30; 20 MG/100ML; MG/100ML; MG/100ML; MG/100ML
9 INJECTION, SOLUTION INTRAVENOUS CONTINUOUS
Status: CANCELLED | OUTPATIENT
Start: 2021-07-22

## 2021-07-22 RX ORDER — SODIUM CHLORIDE 0.9 % (FLUSH) 0.9 %
1-10 SYRINGE (ML) INJECTION AS NEEDED
Status: CANCELLED | OUTPATIENT
Start: 2021-07-22

## 2021-07-22 RX ORDER — ACETAMINOPHEN 325 MG/1
650 TABLET ORAL ONCE
Status: CANCELLED | OUTPATIENT
Start: 2021-07-22 | End: 2021-07-22

## 2021-07-22 RX ORDER — IBUPROFEN 800 MG/1
800 TABLET ORAL ONCE
Status: CANCELLED | OUTPATIENT
Start: 2021-07-22 | End: 2021-07-22

## 2021-07-22 RX ORDER — SODIUM CHLORIDE 0.9 % (FLUSH) 0.9 %
10 SYRINGE (ML) INJECTION EVERY 12 HOURS SCHEDULED
Status: CANCELLED | OUTPATIENT
Start: 2021-07-22

## 2021-07-22 RX ORDER — CHLORHEXIDINE GLUCONATE 4 G/100ML
SOLUTION TOPICAL
Qty: 120 ML | Refills: 0 | Status: SHIPPED | OUTPATIENT
Start: 2021-07-22 | End: 2021-08-04 | Stop reason: HOSPADM

## 2021-07-22 RX ORDER — CEFAZOLIN SODIUM 2 G/100ML
2 INJECTION, SOLUTION INTRAVENOUS ONCE
Status: CANCELLED | OUTPATIENT
Start: 2021-07-22 | End: 2021-07-22

## 2021-07-22 RX ORDER — GABAPENTIN 300 MG/1
300 CAPSULE ORAL 3 TIMES DAILY
COMMUNITY
Start: 2021-06-22 | End: 2021-08-02

## 2021-07-26 PROBLEM — M54.2 CERVICALGIA: Status: ACTIVE | Noted: 2021-07-26

## 2021-08-02 ENCOUNTER — PRE-ADMISSION TESTING (OUTPATIENT)
Dept: PREADMISSION TESTING | Facility: HOSPITAL | Age: 46
End: 2021-08-02

## 2021-08-02 VITALS — BODY MASS INDEX: 17.6 KG/M2 | HEIGHT: 69 IN | WEIGHT: 118.83 LBS

## 2021-08-02 DIAGNOSIS — M54.2 CERVICALGIA: ICD-10-CM

## 2021-08-02 LAB
ANION GAP SERPL CALCULATED.3IONS-SCNC: 11 MMOL/L (ref 5–15)
BUN SERPL-MCNC: 19 MG/DL (ref 6–20)
BUN/CREAT SERPL: 20.9 (ref 7–25)
CALCIUM SPEC-SCNC: 10.3 MG/DL (ref 8.6–10.5)
CHLORIDE SERPL-SCNC: 101 MMOL/L (ref 98–107)
CO2 SERPL-SCNC: 29 MMOL/L (ref 22–29)
CREAT SERPL-MCNC: 0.91 MG/DL (ref 0.76–1.27)
DEPRECATED RDW RBC AUTO: 39 FL (ref 37–54)
ERYTHROCYTE [DISTWIDTH] IN BLOOD BY AUTOMATED COUNT: 11.9 % (ref 12.3–15.4)
GFR SERPL CREATININE-BSD FRML MDRD: 90 ML/MIN/1.73
GLUCOSE SERPL-MCNC: 93 MG/DL (ref 65–99)
HBA1C MFR BLD: 5.5 % (ref 4.8–5.6)
HCT VFR BLD AUTO: 46.3 % (ref 37.5–51)
HGB BLD-MCNC: 15.9 G/DL (ref 13–17.7)
MCH RBC QN AUTO: 30.7 PG (ref 26.6–33)
MCHC RBC AUTO-ENTMCNC: 34.3 G/DL (ref 31.5–35.7)
MCV RBC AUTO: 89.4 FL (ref 79–97)
MRSA DNA SPEC QL NAA+PROBE: NEGATIVE
PLATELET # BLD AUTO: 273 10*3/MM3 (ref 140–450)
PMV BLD AUTO: 10 FL (ref 6–12)
POTASSIUM SERPL-SCNC: 4.4 MMOL/L (ref 3.5–5.2)
QT INTERVAL: 352 MS
QTC INTERVAL: 430 MS
RBC # BLD AUTO: 5.18 10*6/MM3 (ref 4.14–5.8)
SARS-COV-2 RNA PNL SPEC NAA+PROBE: NOT DETECTED
SODIUM SERPL-SCNC: 141 MMOL/L (ref 136–145)
WBC # BLD AUTO: 7.63 10*3/MM3 (ref 3.4–10.8)

## 2021-08-02 PROCEDURE — 85027 COMPLETE CBC AUTOMATED: CPT

## 2021-08-02 PROCEDURE — U0004 COV-19 TEST NON-CDC HGH THRU: HCPCS

## 2021-08-02 PROCEDURE — 83036 HEMOGLOBIN GLYCOSYLATED A1C: CPT

## 2021-08-02 PROCEDURE — 80048 BASIC METABOLIC PNL TOTAL CA: CPT

## 2021-08-02 PROCEDURE — 36415 COLL VENOUS BLD VENIPUNCTURE: CPT

## 2021-08-02 PROCEDURE — 87641 MR-STAPH DNA AMP PROBE: CPT

## 2021-08-02 PROCEDURE — C9803 HOPD COVID-19 SPEC COLLECT: HCPCS

## 2021-08-02 PROCEDURE — 93005 ELECTROCARDIOGRAM TRACING: CPT

## 2021-08-02 PROCEDURE — 93010 ELECTROCARDIOGRAM REPORT: CPT | Performed by: INTERNAL MEDICINE

## 2021-08-04 ENCOUNTER — ANESTHESIA (OUTPATIENT)
Dept: PERIOP | Facility: HOSPITAL | Age: 46
End: 2021-08-04

## 2021-08-04 ENCOUNTER — APPOINTMENT (OUTPATIENT)
Dept: GENERAL RADIOLOGY | Facility: HOSPITAL | Age: 46
End: 2021-08-04

## 2021-08-04 ENCOUNTER — HOSPITAL ENCOUNTER (OUTPATIENT)
Facility: HOSPITAL | Age: 46
Discharge: HOME | End: 2021-08-04
Attending: NEUROLOGICAL SURGERY | Admitting: NEUROLOGICAL SURGERY

## 2021-08-04 ENCOUNTER — ANESTHESIA EVENT (OUTPATIENT)
Dept: PERIOP | Facility: HOSPITAL | Age: 46
End: 2021-08-04

## 2021-08-04 VITALS
SYSTOLIC BLOOD PRESSURE: 116 MMHG | HEART RATE: 77 BPM | WEIGHT: 118 LBS | DIASTOLIC BLOOD PRESSURE: 81 MMHG | OXYGEN SATURATION: 98 % | BODY MASS INDEX: 17.48 KG/M2 | RESPIRATION RATE: 16 BRPM | HEIGHT: 69 IN | TEMPERATURE: 98 F

## 2021-08-04 DIAGNOSIS — M54.2 CERVICALGIA: ICD-10-CM

## 2021-08-04 DIAGNOSIS — M54.12 CERVICAL RADICULOPATHY AT C6: Primary | ICD-10-CM

## 2021-08-04 PROCEDURE — C1889 IMPLANT/INSERT DEVICE, NOC: HCPCS | Performed by: NEUROLOGICAL SURGERY

## 2021-08-04 PROCEDURE — 22845 INSERT SPINE FIXATION DEVICE: CPT | Performed by: NEUROLOGICAL SURGERY

## 2021-08-04 PROCEDURE — 25010000002 PROPOFOL 10 MG/ML EMULSION: Performed by: NURSE ANESTHETIST, CERTIFIED REGISTERED

## 2021-08-04 PROCEDURE — C1713 ANCHOR/SCREW BN/BN,TIS/BN: HCPCS | Performed by: NEUROLOGICAL SURGERY

## 2021-08-04 PROCEDURE — 25010000002 ONDANSETRON PER 1 MG: Performed by: NURSE ANESTHETIST, CERTIFIED REGISTERED

## 2021-08-04 PROCEDURE — 22845 INSERT SPINE FIXATION DEVICE: CPT | Performed by: PHYSICIAN ASSISTANT

## 2021-08-04 PROCEDURE — 20931 SP BONE ALGRFT STRUCT ADD-ON: CPT | Performed by: NEUROLOGICAL SURGERY

## 2021-08-04 PROCEDURE — 25010000002 FENTANYL CITRATE (PF) 50 MCG/ML SOLUTION: Performed by: NURSE ANESTHETIST, CERTIFIED REGISTERED

## 2021-08-04 PROCEDURE — 25010000003 CEFAZOLIN IN DEXTROSE 2-4 GM/100ML-% SOLUTION: Performed by: NEUROLOGICAL SURGERY

## 2021-08-04 PROCEDURE — 76000 FLUOROSCOPY <1 HR PHYS/QHP: CPT

## 2021-08-04 PROCEDURE — 22551 ARTHRD ANT NTRBDY CERVICAL: CPT | Performed by: PHYSICIAN ASSISTANT

## 2021-08-04 PROCEDURE — 22551 ARTHRD ANT NTRBDY CERVICAL: CPT | Performed by: NEUROLOGICAL SURGERY

## 2021-08-04 PROCEDURE — 25010000002 DEXAMETHASONE PER 1 MG: Performed by: NURSE ANESTHETIST, CERTIFIED REGISTERED

## 2021-08-04 PROCEDURE — 25010000002 DIAZEPAM PER 5 MG: Performed by: NEUROLOGICAL SURGERY

## 2021-08-04 PROCEDURE — 25010000002 HYDROMORPHONE PER 4 MG: Performed by: NURSE ANESTHETIST, CERTIFIED REGISTERED

## 2021-08-04 DEVICE — SCRW SKYLINE VAR SD 14MM: Type: IMPLANTABLE DEVICE | Site: SPINE CERVICAL | Status: FUNCTIONAL

## 2021-08-04 DEVICE — HEMOST ABS SURGIFOAM SZ100 8X12 10MM: Type: IMPLANTABLE DEVICE | Site: SPINE CERVICAL | Status: FUNCTIONAL

## 2021-08-04 DEVICE — ALLOGRFT SPINE CERV VERTIGRAFT WEDGE FZ 7DEG PRESERV 5.75MM: Type: IMPLANTABLE DEVICE | Site: SPINE CERVICAL | Status: FUNCTIONAL

## 2021-08-04 DEVICE — FLOSEAL HEMOSTATIC MATRIX, 10ML
Type: IMPLANTABLE DEVICE | Site: SPINE CERVICAL | Status: FUNCTIONAL
Brand: FLOSEAL HEMOSTATIC MATRIX

## 2021-08-04 DEVICE — PLT SKYLINE 1LEVEL 12MM: Type: IMPLANTABLE DEVICE | Site: SPINE CERVICAL | Status: FUNCTIONAL

## 2021-08-04 RX ORDER — LIDOCAINE HYDROCHLORIDE 10 MG/ML
0.5 INJECTION, SOLUTION EPIDURAL; INFILTRATION; INTRACAUDAL; PERINEURAL ONCE AS NEEDED
Status: COMPLETED | OUTPATIENT
Start: 2021-08-04 | End: 2021-08-04

## 2021-08-04 RX ORDER — HYDROMORPHONE HYDROCHLORIDE 1 MG/ML
0.5 INJECTION, SOLUTION INTRAMUSCULAR; INTRAVENOUS; SUBCUTANEOUS
Status: DISCONTINUED | OUTPATIENT
Start: 2021-08-04 | End: 2021-08-04 | Stop reason: SDUPTHER

## 2021-08-04 RX ORDER — ROCURONIUM BROMIDE 10 MG/ML
INJECTION, SOLUTION INTRAVENOUS AS NEEDED
Status: DISCONTINUED | OUTPATIENT
Start: 2021-08-04 | End: 2021-08-04 | Stop reason: SURG

## 2021-08-04 RX ORDER — FAMOTIDINE 10 MG/ML
20 INJECTION, SOLUTION INTRAVENOUS ONCE
Status: DISCONTINUED | OUTPATIENT
Start: 2021-08-04 | End: 2021-08-04 | Stop reason: HOSPADM

## 2021-08-04 RX ORDER — SODIUM CHLORIDE, SODIUM LACTATE, POTASSIUM CHLORIDE, CALCIUM CHLORIDE 600; 310; 30; 20 MG/100ML; MG/100ML; MG/100ML; MG/100ML
9 INJECTION, SOLUTION INTRAVENOUS CONTINUOUS
Status: DISCONTINUED | OUTPATIENT
Start: 2021-08-04 | End: 2021-08-04 | Stop reason: HOSPADM

## 2021-08-04 RX ORDER — HYDROCODONE BITARTRATE AND ACETAMINOPHEN 7.5; 325 MG/1; MG/1
1 TABLET ORAL ONCE
Status: COMPLETED | OUTPATIENT
Start: 2021-08-04 | End: 2021-08-04

## 2021-08-04 RX ORDER — LIDOCAINE HYDROCHLORIDE 10 MG/ML
INJECTION, SOLUTION EPIDURAL; INFILTRATION; INTRACAUDAL; PERINEURAL AS NEEDED
Status: DISCONTINUED | OUTPATIENT
Start: 2021-08-04 | End: 2021-08-04 | Stop reason: SURG

## 2021-08-04 RX ORDER — ONDANSETRON 2 MG/ML
INJECTION INTRAMUSCULAR; INTRAVENOUS AS NEEDED
Status: DISCONTINUED | OUTPATIENT
Start: 2021-08-04 | End: 2021-08-04 | Stop reason: SURG

## 2021-08-04 RX ORDER — LIDOCAINE HYDROCHLORIDE AND EPINEPHRINE 5; 5 MG/ML; UG/ML
INJECTION, SOLUTION INFILTRATION; PERINEURAL AS NEEDED
Status: DISCONTINUED | OUTPATIENT
Start: 2021-08-04 | End: 2021-08-04 | Stop reason: HOSPADM

## 2021-08-04 RX ORDER — PROPOFOL 10 MG/ML
VIAL (ML) INTRAVENOUS AS NEEDED
Status: DISCONTINUED | OUTPATIENT
Start: 2021-08-04 | End: 2021-08-04 | Stop reason: SURG

## 2021-08-04 RX ORDER — FAMOTIDINE 20 MG/1
20 TABLET, FILM COATED ORAL ONCE
Status: COMPLETED | OUTPATIENT
Start: 2021-08-04 | End: 2021-08-04

## 2021-08-04 RX ORDER — MAGNESIUM HYDROXIDE 1200 MG/15ML
LIQUID ORAL AS NEEDED
Status: DISCONTINUED | OUTPATIENT
Start: 2021-08-04 | End: 2021-08-04 | Stop reason: HOSPADM

## 2021-08-04 RX ORDER — SODIUM CHLORIDE 0.9 % (FLUSH) 0.9 %
10 SYRINGE (ML) INJECTION AS NEEDED
Status: DISCONTINUED | OUTPATIENT
Start: 2021-08-04 | End: 2021-08-04 | Stop reason: HOSPADM

## 2021-08-04 RX ORDER — FENTANYL CITRATE 50 UG/ML
INJECTION, SOLUTION INTRAMUSCULAR; INTRAVENOUS AS NEEDED
Status: DISCONTINUED | OUTPATIENT
Start: 2021-08-04 | End: 2021-08-04 | Stop reason: SURG

## 2021-08-04 RX ORDER — DEXAMETHASONE SODIUM PHOSPHATE 4 MG/ML
INJECTION, SOLUTION INTRA-ARTICULAR; INTRALESIONAL; INTRAMUSCULAR; INTRAVENOUS; SOFT TISSUE AS NEEDED
Status: DISCONTINUED | OUTPATIENT
Start: 2021-08-04 | End: 2021-08-04 | Stop reason: SURG

## 2021-08-04 RX ORDER — CEFAZOLIN SODIUM 2 G/100ML
2 INJECTION, SOLUTION INTRAVENOUS ONCE
Status: COMPLETED | OUTPATIENT
Start: 2021-08-04 | End: 2021-08-04

## 2021-08-04 RX ORDER — FENTANYL CITRATE 50 UG/ML
50 INJECTION, SOLUTION INTRAMUSCULAR; INTRAVENOUS
Status: DISCONTINUED | OUTPATIENT
Start: 2021-08-04 | End: 2021-08-04 | Stop reason: HOSPADM

## 2021-08-04 RX ORDER — SODIUM CHLORIDE 0.9 % (FLUSH) 0.9 %
10 SYRINGE (ML) INJECTION EVERY 12 HOURS SCHEDULED
Status: DISCONTINUED | OUTPATIENT
Start: 2021-08-04 | End: 2021-08-04 | Stop reason: HOSPADM

## 2021-08-04 RX ORDER — ACETAMINOPHEN 325 MG/1
650 TABLET ORAL ONCE
Status: COMPLETED | OUTPATIENT
Start: 2021-08-04 | End: 2021-08-04

## 2021-08-04 RX ORDER — FENTANYL CITRATE 50 UG/ML
50 INJECTION, SOLUTION INTRAMUSCULAR; INTRAVENOUS
Status: DISCONTINUED | OUTPATIENT
Start: 2021-08-04 | End: 2021-08-04 | Stop reason: SDUPTHER

## 2021-08-04 RX ORDER — HYDROCODONE BITARTRATE AND ACETAMINOPHEN 7.5; 325 MG/1; MG/1
1 TABLET ORAL EVERY 6 HOURS PRN
Qty: 40 TABLET | Refills: 0 | Status: SHIPPED | OUTPATIENT
Start: 2021-08-04 | End: 2022-01-06

## 2021-08-04 RX ORDER — IBUPROFEN 800 MG/1
800 TABLET ORAL ONCE
Status: COMPLETED | OUTPATIENT
Start: 2021-08-04 | End: 2021-08-04

## 2021-08-04 RX ORDER — HYDROMORPHONE HYDROCHLORIDE 1 MG/ML
0.5 INJECTION, SOLUTION INTRAMUSCULAR; INTRAVENOUS; SUBCUTANEOUS
Status: DISCONTINUED | OUTPATIENT
Start: 2021-08-04 | End: 2021-08-04 | Stop reason: HOSPADM

## 2021-08-04 RX ORDER — DIAZEPAM 5 MG/ML
5 INJECTION, SOLUTION INTRAMUSCULAR; INTRAVENOUS EVERY 4 HOURS PRN
Status: COMPLETED | OUTPATIENT
Start: 2021-08-04 | End: 2021-08-04

## 2021-08-04 RX ORDER — MIDAZOLAM HYDROCHLORIDE 1 MG/ML
1 INJECTION INTRAMUSCULAR; INTRAVENOUS
Status: DISCONTINUED | OUTPATIENT
Start: 2021-08-04 | End: 2021-08-04 | Stop reason: HOSPADM

## 2021-08-04 RX ADMIN — SODIUM CHLORIDE, POTASSIUM CHLORIDE, SODIUM LACTATE AND CALCIUM CHLORIDE 9 ML/HR: 600; 310; 30; 20 INJECTION, SOLUTION INTRAVENOUS at 06:45

## 2021-08-04 RX ADMIN — HYDROMORPHONE HYDROCHLORIDE 0.5 MG: 1 INJECTION, SOLUTION INTRAMUSCULAR; INTRAVENOUS; SUBCUTANEOUS at 10:04

## 2021-08-04 RX ADMIN — FENTANYL CITRATE 100 MCG: 50 INJECTION, SOLUTION INTRAMUSCULAR; INTRAVENOUS at 07:33

## 2021-08-04 RX ADMIN — ROCURONIUM BROMIDE 10 MG: 10 INJECTION, SOLUTION INTRAVENOUS at 08:19

## 2021-08-04 RX ADMIN — LIDOCAINE HYDROCHLORIDE 50 MG: 10 INJECTION, SOLUTION EPIDURAL; INFILTRATION; INTRACAUDAL; PERINEURAL at 07:33

## 2021-08-04 RX ADMIN — SUGAMMADEX 107 MG: 100 INJECTION, SOLUTION INTRAVENOUS at 08:53

## 2021-08-04 RX ADMIN — ACETAMINOPHEN 650 MG: 325 TABLET ORAL at 07:03

## 2021-08-04 RX ADMIN — HYDROMORPHONE HYDROCHLORIDE 0.5 MG: 1 INJECTION, SOLUTION INTRAMUSCULAR; INTRAVENOUS; SUBCUTANEOUS at 09:37

## 2021-08-04 RX ADMIN — CEFAZOLIN SODIUM 2 G: 10 INJECTION, POWDER, FOR SOLUTION INTRAVENOUS at 07:32

## 2021-08-04 RX ADMIN — HYDROMORPHONE HYDROCHLORIDE 0.5 MG: 1 INJECTION, SOLUTION INTRAMUSCULAR; INTRAVENOUS; SUBCUTANEOUS at 09:26

## 2021-08-04 RX ADMIN — FENTANYL CITRATE 50 MCG: 50 INJECTION, SOLUTION INTRAMUSCULAR; INTRAVENOUS at 09:23

## 2021-08-04 RX ADMIN — FAMOTIDINE 20 MG: 20 TABLET ORAL at 07:03

## 2021-08-04 RX ADMIN — HYDROCODONE BITARTRATE AND ACETAMINOPHEN 1 TABLET: 7.5; 325 TABLET ORAL at 07:03

## 2021-08-04 RX ADMIN — DEXAMETHASONE SODIUM PHOSPHATE 8 MG: 4 INJECTION, SOLUTION INTRA-ARTICULAR; INTRALESIONAL; INTRAMUSCULAR; INTRAVENOUS; SOFT TISSUE at 07:36

## 2021-08-04 RX ADMIN — ONDANSETRON 4 MG: 2 INJECTION INTRAMUSCULAR; INTRAVENOUS at 08:47

## 2021-08-04 RX ADMIN — ROCURONIUM BROMIDE 50 MG: 10 INJECTION, SOLUTION INTRAVENOUS at 07:33

## 2021-08-04 RX ADMIN — DIAZEPAM 5 MG: 5 INJECTION, SOLUTION INTRAMUSCULAR; INTRAVENOUS at 10:06

## 2021-08-04 RX ADMIN — PROPOFOL 200 MG: 10 INJECTION, EMULSION INTRAVENOUS at 07:33

## 2021-08-04 RX ADMIN — FENTANYL CITRATE 50 MCG: 50 INJECTION, SOLUTION INTRAMUSCULAR; INTRAVENOUS at 09:15

## 2021-08-04 RX ADMIN — LIDOCAINE HYDROCHLORIDE 0.2 ML: 10 INJECTION, SOLUTION EPIDURAL; INFILTRATION; INTRACAUDAL; PERINEURAL at 06:45

## 2021-08-04 RX ADMIN — IBUPROFEN 800 MG: 800 TABLET, FILM COATED ORAL at 07:03

## 2021-08-19 ENCOUNTER — OFFICE VISIT (OUTPATIENT)
Dept: NEUROSURGERY | Facility: CLINIC | Age: 46
End: 2021-08-19

## 2021-08-19 VITALS
DIASTOLIC BLOOD PRESSURE: 62 MMHG | WEIGHT: 124.6 LBS | BODY MASS INDEX: 18.46 KG/M2 | HEIGHT: 69 IN | SYSTOLIC BLOOD PRESSURE: 108 MMHG | TEMPERATURE: 97.6 F

## 2021-08-19 DIAGNOSIS — M54.2 CERVICALGIA: ICD-10-CM

## 2021-08-19 DIAGNOSIS — M54.12 CERVICAL RADICULOPATHY AT C6: Primary | ICD-10-CM

## 2021-08-19 DIAGNOSIS — Z98.1 S/P CERVICAL SPINAL FUSION: ICD-10-CM

## 2021-08-19 PROCEDURE — 99024 POSTOP FOLLOW-UP VISIT: CPT | Performed by: PHYSICIAN ASSISTANT

## 2021-08-19 RX ORDER — METHOCARBAMOL 750 MG/1
750 TABLET, FILM COATED ORAL 3 TIMES DAILY PRN
Qty: 60 TABLET | Refills: 1 | Status: SHIPPED | OUTPATIENT
Start: 2021-08-19 | End: 2022-01-06

## 2021-08-19 RX ORDER — TRAMADOL HYDROCHLORIDE 50 MG/1
50 TABLET ORAL 3 TIMES DAILY PRN
Qty: 25 TABLET | Refills: 0 | Status: SHIPPED | OUTPATIENT
Start: 2021-08-19 | End: 2022-01-06

## 2021-09-15 ENCOUNTER — TELEPHONE (OUTPATIENT)
Dept: NEUROSURGERY | Facility: CLINIC | Age: 46
End: 2021-09-15

## 2021-10-14 ENCOUNTER — HOSPITAL ENCOUNTER (EMERGENCY)
Dept: HOSPITAL 49 - FER | Age: 46
Discharge: HOME | End: 2021-10-14
Payer: COMMERCIAL

## 2021-10-14 DIAGNOSIS — J44.9: ICD-10-CM

## 2021-10-14 DIAGNOSIS — F19.10: Primary | ICD-10-CM

## 2021-10-14 LAB
ALBUMIN SERPL-MCNC: 4.3 G/DL (ref 3.4–5)
ALKALINE PHOSHATASE: 82 U/L (ref 46–116)
ALT SERPL-CCNC: 50 U/L (ref 16–63)
AMPHETAMINES: NEGATIVE
AST: 28 U/L (ref 15–37)
BARBITURATES: NEGATIVE
BASOPHIL: 0.8 % (ref 0–2)
BILIRUBIN - TOTAL: 0.6 MG/DL (ref 0.2–1)
BILIRUBIN: NEGATIVE MG/DL
BLOOD: NEGATIVE ERY/UL
BUN SERPL-MCNC: 19 MG/DL (ref 7–18)
BUN/CREAT RATIO (CALC): 20 RATIO
CHLORIDE: 102 MMOL/L (ref 98–107)
CLARITY UR: CLEAR
CO2 (BICARBONATE): 30 MMOL/L (ref 21–32)
COLOR: YELLOW
CREATININE: 0.95 MG/DL (ref 0.67–1.17)
ECSTASY (MDMA): NEGATIVE
EOSINOPHIL: 3.2 % (ref 0–5)
GLOBULIN (CALCULATION): 3.6 G/DL
GLUCOSE (U): NORMAL MG/DL
GLUCOSE SERPL-MCNC: 82 MG/DL (ref 74–106)
HCT: 49.7 % (ref 42–52)
HGB BLD-MCNC: 16.4 G/DL (ref 13.2–18)
LEUKOCYTES: NEGATIVE LEU/UL
LYMPHOCYTE: 31.7 % (ref 15–48)
MAGNESIUM SERPL-MCNC: 2.4 MG/DL (ref 1.8–2.4)
MARIJUANA (THC): POSITIVE
MCH RBC QN AUTO: 29.9 PG (ref 25–31)
MCHC RBC AUTO-ENTMCNC: 33 G/DL (ref 32–36)
MCV: 90.7 FL (ref 78–100)
METHADONE: NEGATIVE
MONOCYTE: 6.6 % (ref 0–12)
MPV: 9.8 FL (ref 6–9.5)
NEUTROPHIL: 57.2 % (ref 41–80)
NITRITE: NEGATIVE MG/DL
NRBC: 0
OPIATES: NEGATIVE
OXYCODONE: NEGATIVE
PLT: 308 K/UL (ref 150–400)
POTASSIUM: 4.1 MMOL/L (ref 3.5–5.1)
PROTEIN: NEGATIVE MG/DL
RBC MORPHOLOGY: NORMAL
RBC: 5.48 M/UL (ref 4.7–6)
RDW: 11.9 % (ref 11.5–14)
SPECIFIC GRAVITY: 1.01 (ref 1–1.03)
TOTAL PROTEIN: 7.9 G/DL (ref 6.4–8.2)
UROBILINOGEN: 1 MG/DL (ref 0.2–1)
WBC: 8.4 K/UL (ref 4–10.5)

## 2021-11-01 ENCOUNTER — TELEPHONE (OUTPATIENT)
Dept: NEUROSURGERY | Facility: CLINIC | Age: 46
End: 2021-11-01

## 2022-01-06 ENCOUNTER — OFFICE VISIT (OUTPATIENT)
Dept: NEUROSURGERY | Facility: CLINIC | Age: 47
End: 2022-01-06

## 2022-01-06 ENCOUNTER — HOSPITAL ENCOUNTER (OUTPATIENT)
Dept: GENERAL RADIOLOGY | Facility: HOSPITAL | Age: 47
Discharge: HOME | End: 2022-01-06
Admitting: PHYSICIAN ASSISTANT

## 2022-01-06 VITALS
BODY MASS INDEX: 19.7 KG/M2 | WEIGHT: 133 LBS | HEART RATE: 73 BPM | HEIGHT: 69 IN | TEMPERATURE: 98.2 F | OXYGEN SATURATION: 99 %

## 2022-01-06 DIAGNOSIS — M51.16 LUMBAR DISC HERNIATION WITH RADICULOPATHY: ICD-10-CM

## 2022-01-06 DIAGNOSIS — M54.12 CERVICAL RADICULOPATHY AT C6: Primary | ICD-10-CM

## 2022-01-06 PROCEDURE — 72040 X-RAY EXAM NECK SPINE 2-3 VW: CPT

## 2022-01-06 PROCEDURE — 99213 OFFICE O/P EST LOW 20 MIN: CPT | Performed by: PHYSICIAN ASSISTANT

## 2022-01-06 RX ORDER — IBUPROFEN 800 MG/1
1 TABLET ORAL DAILY PRN
COMMUNITY
Start: 2021-11-27

## 2022-01-06 RX ORDER — HYDROXYZINE PAMOATE 25 MG/1
25 CAPSULE ORAL 2 TIMES DAILY
COMMUNITY
Start: 2021-12-20

## 2022-01-06 RX ORDER — ATENOLOL 25 MG/1
25 TABLET ORAL EVERY MORNING
COMMUNITY
Start: 2021-12-13

## 2022-01-06 RX ORDER — ONDANSETRON 4 MG/1
TABLET, FILM COATED ORAL
COMMUNITY
Start: 2021-10-14 | End: 2022-04-28

## 2022-02-10 DIAGNOSIS — J98.4 RESTRICTIVE LUNG DISEASE: ICD-10-CM

## 2022-02-16 DIAGNOSIS — J98.4 RESTRICTIVE LUNG DISEASE: ICD-10-CM

## 2022-04-11 ENCOUNTER — TELEPHONE (OUTPATIENT)
Dept: NEUROSURGERY | Facility: CLINIC | Age: 47
End: 2022-04-11

## 2022-04-28 ENCOUNTER — OFFICE VISIT (OUTPATIENT)
Dept: NEUROSURGERY | Facility: CLINIC | Age: 47
End: 2022-04-28

## 2022-04-28 VITALS — TEMPERATURE: 97.7 F | HEIGHT: 69 IN | BODY MASS INDEX: 18.93 KG/M2 | WEIGHT: 127.8 LBS

## 2022-04-28 DIAGNOSIS — M51.16 LUMBAR DISC HERNIATION WITH RADICULOPATHY: Primary | ICD-10-CM

## 2022-04-28 PROCEDURE — 99214 OFFICE O/P EST MOD 30 MIN: CPT | Performed by: PHYSICIAN ASSISTANT

## 2022-04-28 RX ORDER — SIMVASTATIN 40 MG
40 TABLET ORAL NIGHTLY
COMMUNITY

## 2022-04-28 RX ORDER — GABAPENTIN 300 MG/1
300 CAPSULE ORAL 3 TIMES DAILY
Qty: 90 CAPSULE | Refills: 0 | Status: SHIPPED | OUTPATIENT
Start: 2022-04-28 | End: 2022-05-17 | Stop reason: DRUGHIGH

## 2022-05-05 ENCOUNTER — TELEPHONE (OUTPATIENT)
Dept: NEUROSURGERY | Facility: CLINIC | Age: 47
End: 2022-05-05

## 2022-05-05 ENCOUNTER — OFFICE VISIT (OUTPATIENT)
Dept: NEUROSURGERY | Facility: CLINIC | Age: 47
End: 2022-05-05

## 2022-05-05 VITALS
TEMPERATURE: 97.9 F | WEIGHT: 127.8 LBS | SYSTOLIC BLOOD PRESSURE: 122 MMHG | BODY MASS INDEX: 18.93 KG/M2 | HEIGHT: 69 IN | DIASTOLIC BLOOD PRESSURE: 70 MMHG

## 2022-05-05 DIAGNOSIS — M51.9 LUMBOSACRAL DISC DISEASE: ICD-10-CM

## 2022-05-05 DIAGNOSIS — G89.29 CHRONIC BILATERAL LOW BACK PAIN WITH LEFT-SIDED SCIATICA: ICD-10-CM

## 2022-05-05 DIAGNOSIS — M54.42 CHRONIC BILATERAL LOW BACK PAIN WITH LEFT-SIDED SCIATICA: ICD-10-CM

## 2022-05-05 DIAGNOSIS — M51.16 LUMBAR DISC HERNIATION WITH RADICULOPATHY: Primary | ICD-10-CM

## 2022-05-05 DIAGNOSIS — M54.9 MECHANICAL BACK PAIN: ICD-10-CM

## 2022-05-05 PROBLEM — M54.50 LOW BACK PAIN: Status: ACTIVE | Noted: 2021-03-04

## 2022-05-05 PROCEDURE — 99214 OFFICE O/P EST MOD 30 MIN: CPT | Performed by: PHYSICIAN ASSISTANT

## 2022-05-08 ENCOUNTER — APPOINTMENT (OUTPATIENT)
Dept: CT IMAGING | Facility: HOSPITAL | Age: 47
End: 2022-05-08
Payer: MEDICAID

## 2022-05-08 ENCOUNTER — HOSPITAL ENCOUNTER (EMERGENCY)
Facility: HOSPITAL | Age: 47
Discharge: HOME | End: 2022-05-08
Attending: HOSPITALIST
Payer: MEDICAID

## 2022-05-08 ENCOUNTER — APPOINTMENT (OUTPATIENT)
Dept: GENERAL RADIOLOGY | Facility: HOSPITAL | Age: 47
End: 2022-05-08
Payer: MEDICAID

## 2022-05-08 VITALS
HEART RATE: 94 BPM | SYSTOLIC BLOOD PRESSURE: 123 MMHG | OXYGEN SATURATION: 97 % | DIASTOLIC BLOOD PRESSURE: 94 MMHG | WEIGHT: 127 LBS | HEIGHT: 69 IN | RESPIRATION RATE: 16 BRPM | TEMPERATURE: 98.1 F | BODY MASS INDEX: 18.81 KG/M2

## 2022-05-08 DIAGNOSIS — S06.0X0A CONCUSSION WITHOUT LOSS OF CONSCIOUSNESS, INITIAL ENCOUNTER: ICD-10-CM

## 2022-05-08 DIAGNOSIS — S09.90XA INJURY OF HEAD, INITIAL ENCOUNTER: Primary | ICD-10-CM

## 2022-05-08 DIAGNOSIS — R07.81 RIB PAIN: ICD-10-CM

## 2022-05-08 PROCEDURE — 70450 CT HEAD/BRAIN W/O DYE: CPT

## 2022-05-08 PROCEDURE — 71101 X-RAY EXAM UNILAT RIBS/CHEST: CPT

## 2022-05-08 PROCEDURE — 6370000000 HC RX 637 (ALT 250 FOR IP): Performed by: HOSPITALIST

## 2022-05-08 PROCEDURE — 99284 EMERGENCY DEPT VISIT MOD MDM: CPT

## 2022-05-08 RX ORDER — OXYCODONE HYDROCHLORIDE AND ACETAMINOPHEN 5; 325 MG/1; MG/1
2 TABLET ORAL ONCE
Status: COMPLETED | OUTPATIENT
Start: 2022-05-08 | End: 2022-05-08

## 2022-05-08 RX ORDER — GABAPENTIN 300 MG/1
300 CAPSULE ORAL 4 TIMES DAILY
COMMUNITY

## 2022-05-08 RX ORDER — SIMVASTATIN 40 MG
40 TABLET ORAL NIGHTLY
COMMUNITY

## 2022-05-08 RX ORDER — ATENOLOL 25 MG/1
25 TABLET ORAL DAILY
COMMUNITY

## 2022-05-08 RX ORDER — MIRTAZAPINE 15 MG/1
15 TABLET, ORALLY DISINTEGRATING ORAL NIGHTLY
COMMUNITY

## 2022-05-08 RX ORDER — ONDANSETRON 4 MG/1
4 TABLET, ORALLY DISINTEGRATING ORAL EVERY 4 HOURS PRN
Qty: 10 TABLET | Refills: 0 | Status: SHIPPED | OUTPATIENT
Start: 2022-05-08

## 2022-05-08 RX ORDER — ONDANSETRON 4 MG/1
8 TABLET, ORALLY DISINTEGRATING ORAL ONCE
Status: COMPLETED | OUTPATIENT
Start: 2022-05-08 | End: 2022-05-08

## 2022-05-08 RX ADMIN — ONDANSETRON 8 MG: 4 TABLET, ORALLY DISINTEGRATING ORAL at 16:55

## 2022-05-08 RX ADMIN — OXYCODONE AND ACETAMINOPHEN 2 TABLET: 5; 325 TABLET ORAL at 16:55

## 2022-05-08 ASSESSMENT — PAIN DESCRIPTION - LOCATION: LOCATION: RIB CAGE

## 2022-05-08 ASSESSMENT — PAIN DESCRIPTION - ORIENTATION: ORIENTATION: RIGHT

## 2022-05-08 ASSESSMENT — PAIN DESCRIPTION - DESCRIPTORS: DESCRIPTORS: STABBING

## 2022-05-08 NOTE — ED PROVIDER NOTES
[facility] ENCOUNTER      Pt Name: Kirsten Dickens  MRN: [id number]  YOB: 1975  Date of evaluation: 5/8/2022  [facility]       Chief Complaint   Patient presents with    Fall    Head Injury    Back Pain    Arm Pain    Neck Pain         HISTORY OF PRESENT ILLNESS  (Location/Symptom, Timing/Onset, Context/Setting, Quality, Duration, Modifying Factors, Severity.)   Kirsten Dickens is a 55 y.o. male who presents to the emergency department for fall. Patient states he took a \"horrendous\" fall this morning. He states the dog to go outside so he was attempting to get out of the bed he states he stood up and put his foot on the bed rail and was attempting to step over his girlfriend and out of the bed when he did he states that his leg slipped and it went between the foot rail and the mattress and the gap which caused him to fall backwards. Patient states he struck his head against a TV stand and states that it folded him up like head in the crotch folded up. Patient states that since then he has had continued pain across the lower portion of his head over the occipital area. He denies any neck pain. He denies any actual true back pain. Patient states he also hit his arm is complaining of pain to the posterior of his left upper arm he states there is a 3 \"scratch\" there. He is also complaining of pain to the right posterior ribs just below his shoulder blade. He denies any numbness tingling or weakness. States he has known herniated disc at L3 cause him to have sciatica down the left side he states that he is on gabapentin for that. He already takes muscle relaxers at home. He is written for 800 mg of Motrin for pain. Patient states that he has continued to have nausea ever since the head injury itself but denies any loss of consciousness. Denies any vomiting since the injury which was early this morning. Patient states that as a day goes on he just becomes more sore and tender. States only thing it really helps with him to sort of get more into a fetal position to help ease his discomfort. Otherwise he denies any loss of bowel or bladder control. Denies any numbness tingling weakness of extremities out of ordinary. Denies any flank pain out of ordinary. Denies any change urinary frequency denies any hematuria. Nursing notes were reviewed. REVIEW OFSYSTEMS    (2-9 systems for level 4, 10 or more for level 5)   ROS:  General:  No fevers, no chills, no weakness  Cardiovascular:  No chest pain, no palpitations  Respiratory:  No shortness of breath, no cough, no wheezing  Gastrointestinal:  No pain, no nausea, no vomiting, no diarrhea  Musculoskeletal:  + Left arm pain/abrasion, no joint pain, + right posterior rib pain  Skin:  No rash, no easy bruising  Neurologic:  No speech problems, + headache/pain-trauma, no extremity weakness  Psychiatric:  No anxiety  Genitourinary:  No dysuria, no hematuria    Except as noted above the remainder of the review of systems was reviewed and negative. PAST MEDICAL HISTORY     Past Medical History:   Diagnosis Date    Hepatitis C     Hepatitis C     Hypoglycemia          SURGICAL HISTORY       Past Surgical History:   Procedure Laterality Date    MOUTH SURGERY      NECK SURGERY      ROTATOR CUFF REPAIR      UPPER GASTROINTESTINAL ENDOSCOPY N/A 6/10/2019    EGD BIOPSY performed by Collin Olmos MD at [facility]       Previous Medications    ALBUTEROL SULFATE HFA (VENTOLIN HFA) 108 (90 BASE) MCG/ACT INHALER    Inhale 2 puffs into the lungs 4 times daily as needed for Wheezing    ATENOLOL (TENORMIN) 25 MG TABLET    Take 25 mg by mouth daily    GABAPENTIN (NEURONTIN) 300 MG CAPSULE    Take 300 mg by mouth 4 times daily.     IBUPROFEN (IBU) 800 MG TABLET    Take 1 tablet by mouth every 6 hours as needed for Pain    MIRTAZAPINE (REMERON SOL-TAB) 15 MG DISINTEGRATING TABLET    Take 15 mg by mouth nightly    PANTOPRAZOLE (PROTONIX) 40 MG TABLET    TAKE ONE TABLET BY MOUTH EVERY MORNING (BEFORE BREAKFAST)    SIMVASTATIN (ZOCOR) 40 MG TABLET    Take 40 mg by mouth nightly    TIZANIDINE (ZANAFLEX) 4 MG TABLET    Take 4 mg by mouth 2 times daily as needed        ALLERGIES     Patient has no known allergies. FAMILY HISTORY     History reviewed. No pertinent family history. SOCIAL HISTORY       Social History     Socioeconomic History    Marital status: Single     Spouse name: None    Number of children: None    Years of education: None    Highest education level: None   Occupational History    None   Tobacco Use    Smoking status: Current Every Day Smoker     Packs/day: 0.50     Years: 29.00     Pack years: 14.50     Types: Cigarettes    Smokeless tobacco: Never Used   Vaping Use    Vaping Use: Never used   Substance and Sexual Activity    Alcohol use: No    Drug use: Yes     Types: Marijuana Libby Awkward)    Sexual activity: None   Other Topics Concern    None   Social History Narrative    None     Social Determinants of Health     Financial Resource Strain:     Difficulty of Paying Living Expenses: Not on file   Food Insecurity:     Worried About Running Out of Food in the Last Year: Not on file    Ebnjamin of Food in the Last Year: Not on file   Transportation Needs:     Lack of Transportation (Medical): Not on file    Lack of Transportation (Non-Medical):  Not on file   Physical Activity:     Days of Exercise per Week: Not on file    Minutes of Exercise per Session: Not on file   Stress:     Feeling of Stress : Not on file   Social Connections:     Frequency of Communication with Friends and Family: Not on file    Frequency of Social Gatherings with Friends and Family: Not on file    Attends Temple Services: Not on file    Active Member of Clubs or Organizations: Not on file    Attends Club or Organization Meetings: Not on file    Marital Status: Not on file   Intimate Partner Violence:     Fear of Current or Ex-Partner: Not on file    Emotionally Abused: Not on file    Physically Abused: Not on file    Sexually Abused: Not on file   Housing Stability:     Unable to Pay for Housing in the Last Year: Not on file    Number of [place] in the Last Year: Not on file    Unstable Housing in the Last Year: Not on file         PHYSICAL EXAM    (up to 7 for level 4, 8 or more for level 5)     ED Triage Vitals [05/08/22 1549]   BP Temp Temp Source Pulse Resp SpO2 Height Weight   (!) 131/93 98.1 °F (36.7 °C) Oral 94 16 98 % 5' 9\" (1.753 m) 127 lb (57.6 kg)       Physical Exam  General :Patient is awake, alert, oriented, in no acute distress, nontoxic appearing  HEENT: Pupils are equally round and reactive to light, EOMI, conjunctivae clear. Oral mucosa is moist, no exudate. Uvula is midline there is palpable tenderness over the inferior aspect of the occipital area just below the occipital protuberance but there is not any definitive palpable deformity or depression noted  Neck: Neck is supple, full range of motion, trachea midline, no midline bony tenderness to palpation  Cardiac: Heart regular rate, rhythm, no murmurs, rubs, or gallops  Lungs: Lungs are clear to auscultation, there is no wheezing, rhonchi, or rales. There is no use of accessory muscles. Chest wall: There is palpable tenderness to the right posterior ribs midline just inferior to the tip of the scapula. There is no contusion or bruising noted. No paradoxical movement. No crepitus noted. Abdomen: Abdomen is soft, nontender, nondistended. There is no firm or pulsatile masses, no rebound rigidity or guarding. Musculoskeletal: No focal muscle deficits are appreciated, range of motion is intact to the left upper extremity the 1 that is having the majority the pain because of the abrasions. Range of motion is intact of both upper extremities. Distal pulses at the radial is +2. Cap refills less than 3 seconds. Neurovascular intact distally. Neuro: Motor intact, sensory intact, level of consciousness is normal  Dermatology: Skin is warm and dry, and abrasion noted to the posterior aspect of the left arm right about the start of the axilla and down just below the triceps area. There is also a very minimal straight-line abrasion across the back towards the scapula area. Psych: Mentation is grossly normal, cognition is grossly normal. Affect is appropriate. BACK: There is not thoracic or lumbar midline tenderness to palpation or step-offs. Paraspinal tenderness to palpation is not present in the thoracic or lumbar region. No overlying rashes. LE strength is 5/5. LE light touch is intact. LE DTR's are 2+ in the patellas and achilles. Straight leg test is negative on the RIGHT, negative on the LEFT. DIAGNOSTIC RESULTS     EKG: All EKG's are interpreted by the Emergency Department Physician who either signs or Co-signs this chart in the [address] a cardiologist.        RADIOLOGY:   Non-plain film images such as CT, Ultrasound and MRI are read by the radiologist. Plain radiographic images are visualized and preliminarily interpreted by the emergency physician with the below findings:      ? Radiologist's Report Reviewed:  XR RIBS RIGHT INCLUDE CHEST (MIN 3 VIEWS)   Final Result      No acute chest process. No rib fractures or suspicious bony lesions. CT Head WO Contrast   Final Result      No acute intracranial process. ED BEDSIDE ULTRASOUND:   Performed by ED Physician - none    LABS:    I have reviewed and interpreted all of the currently available lab results from this visit (ifapplicable):  No results found for this visit on 05/08/22. All other labs were within normal range or not returned as of this dictation.     EMERGENCY DEPARTMENT COURSE and DIFFERENTIAL DIAGNOSIS/MDM:   Vitals:    Vitals:    05/08/22 1549   BP: (!) 131/93   Pulse: 94   Resp: 16   Temp: 98.1 °F (36.7 °C)   TempSrc: Oral   SpO2: 98%   Weight: 127 lb (57.6 kg)   Height: 5' 9\" (1.753 m)       MEDICATIONS ADMINISTERED IN ED:  Medications   oxyCODONE-acetaminophen (PERCOCET) 5-325 MG per tablet 2 tablet (2 tablets Oral Given 5/8/22 1655)   ondansetron (ZOFRAN-ODT) disintegrating tablet 8 mg (8 mg Oral Given 5/8/22 1655)       After initial evaluation examination I did have a conversation with the patient about the upcoming plan, treatment possible disposition which she was agreeable to the times dictation. Patient carolyn we give a couple Percocet 5/325 tablets here for pain but would not be able to write him for any for home unless there is any acute fractures noted on his radiographs. Patient thinks he does not believe he fractured anything but he was just here because of the worsening pain. He has already written for muscle relaxers at home. Advised we give him some Zofran for nausea since this is the precursor to why we are going to perform CT scan of the head. He does have palpable tenderness to the posterior aspect of the head over the occipital area but has also had continued nausea since the fall this morning but denies any vomiting again denies any loss of consciousness. He is not on any blood thinners or antiplatelet medication. He also have radiograph of the right ribs with chest to rule out acute fracture. Otherwise the patient is resting on the stretcher in no acute distress. Nontoxic-appearing. Final disposition will determine once his radiological diagnostic studies been performed reviewed. Right rib series with chest read by radiology as no acute chest process. No rib fractures or suspicious bony lesions. CT scan of the head without contrast read by radiology as no acute intracranial process. Patient's radiological studies were discussed with him and he does state his understanding. Advised the negative findings on his radiograph. Did discuss with him about the concussion precautions especially with his head injury and then the persistent nausea. He does state his understanding of these. We will write him a prescription for Zofran for nausea. Advised use Tylenol Motrin for pain. Advised to continue with his regular medications at home also discussed with her about possibly using heat such as heating pad or hot water soaks to see if this helps with some of his discomfort that he is having but otherwise he will be discharged home in stable condition. The patient has a does need to follow-up with his regular family physician within the next 1 to 2 days for evaluation. Is also given instructions of the symptoms worsens or new symptoms arise he should return back to emergency department for further evaluation work-up. CONSULTS:  None    PROCEDURES:  Procedures    CRITICAL CARE TIME    Total Critical Care time was 0 minutes, excluding separately reportable procedures. There was a high probability of clinically significant/life threatening deterioration in the patient's condition which required my urgent intervention. FINAL IMPRESSION      1. Injury of head, initial encounter    2. Concussion without loss of consciousness, initial encounter    3. Rib pain          DISPOSITION/PLAN   DISPOSITION        PATIENT REFERRED TO:  Sam Floyd APRN - NP  [address] [place] [address]  [phone]    In 2 days      [facility] Emergency Department  [facility] [address]   [facility]  [phone]    As needed, If symptoms worsen      DISCHARGE MEDICATIONS:  New Prescriptions    ONDANSETRON (ZOFRAN ODT) 4 MG DISINTEGRATING TABLET    Take 1 tablet by mouth every 4 hours as needed for Nausea or Vomiting       Comment: Please note this report has been produced using speech recognition software and may contain errorsrelated to that system including errors in grammar, punctuation, and spelling, as well as words and phrases that may be inappropriate. If there are any questions or concerns please feel free to contact the dictating providerfor clarification.     Marilynn Lake DO  Attending Emergency Physician              Marilynn Lake DO  05/08/22 3311

## 2022-05-08 NOTE — ED NOTES
Discharge instructions reviewed and medications discussed with verbalized understanding from patient. Patient had no further questions or concerns.         Ivan Ga RN  05/08/22 1360

## 2022-05-09 ENCOUNTER — TELEPHONE (OUTPATIENT)
Dept: NEUROSURGERY | Facility: CLINIC | Age: 47
End: 2022-05-09

## 2022-05-17 ENCOUNTER — TELEPHONE (OUTPATIENT)
Dept: NEUROSURGERY | Facility: CLINIC | Age: 47
End: 2022-05-17

## 2022-05-17 DIAGNOSIS — M51.16 LUMBAR DISC HERNIATION WITH RADICULOPATHY: ICD-10-CM

## 2022-05-17 RX ORDER — GABAPENTIN 300 MG/1
300 CAPSULE ORAL 3 TIMES DAILY
Qty: 90 CAPSULE | Refills: 0 | Status: CANCELLED | OUTPATIENT
Start: 2022-05-17

## 2022-05-17 RX ORDER — GABAPENTIN 600 MG/1
600 TABLET ORAL 2 TIMES DAILY
Qty: 60 TABLET | Refills: 0 | Status: SHIPPED | OUTPATIENT
Start: 2022-05-17

## 2022-09-05 ENCOUNTER — APPOINTMENT (OUTPATIENT)
Dept: GENERAL RADIOLOGY | Facility: HOSPITAL | Age: 47
End: 2022-09-05

## 2022-09-05 ENCOUNTER — APPOINTMENT (OUTPATIENT)
Dept: CT IMAGING | Facility: HOSPITAL | Age: 47
End: 2022-09-05

## 2022-09-05 ENCOUNTER — HOSPITAL ENCOUNTER (EMERGENCY)
Facility: HOSPITAL | Age: 47
Discharge: HOME | End: 2022-09-05
Attending: EMERGENCY MEDICINE | Admitting: EMERGENCY MEDICINE

## 2022-09-05 VITALS
RESPIRATION RATE: 16 BRPM | TEMPERATURE: 98.3 F | SYSTOLIC BLOOD PRESSURE: 140 MMHG | BODY MASS INDEX: 18.51 KG/M2 | HEIGHT: 69 IN | HEART RATE: 75 BPM | OXYGEN SATURATION: 99 % | DIASTOLIC BLOOD PRESSURE: 90 MMHG | WEIGHT: 125 LBS

## 2022-09-05 DIAGNOSIS — M54.50 ACUTE BILATERAL LOW BACK PAIN, UNSPECIFIED WHETHER SCIATICA PRESENT: Primary | ICD-10-CM

## 2022-09-05 PROCEDURE — 25010000002 ORPHENADRINE CITRATE PER 60 MG: Performed by: EMERGENCY MEDICINE

## 2022-09-05 PROCEDURE — 72131 CT LUMBAR SPINE W/O DYE: CPT

## 2022-09-05 PROCEDURE — 25010000002 MORPHINE PER 10 MG: Performed by: EMERGENCY MEDICINE

## 2022-09-05 PROCEDURE — 25010000002 KETOROLAC TROMETHAMINE PER 15 MG: Performed by: EMERGENCY MEDICINE

## 2022-09-05 PROCEDURE — 99283 EMERGENCY DEPT VISIT LOW MDM: CPT

## 2022-09-05 PROCEDURE — 96375 TX/PRO/DX INJ NEW DRUG ADDON: CPT

## 2022-09-05 PROCEDURE — 96374 THER/PROPH/DIAG INJ IV PUSH: CPT

## 2022-09-05 RX ORDER — SODIUM CHLORIDE 0.9 % (FLUSH) 0.9 %
10 SYRINGE (ML) INJECTION AS NEEDED
Status: DISCONTINUED | OUTPATIENT
Start: 2022-09-05 | End: 2022-09-05 | Stop reason: HOSPADM

## 2022-09-05 RX ORDER — SULFAMETHOXAZOLE AND TRIMETHOPRIM 800; 160 MG/1; MG/1
1 TABLET ORAL ONCE
Status: COMPLETED | OUTPATIENT
Start: 2022-09-05 | End: 2022-09-05

## 2022-09-05 RX ORDER — MORPHINE SULFATE 4 MG/ML
4 INJECTION, SOLUTION INTRAMUSCULAR; INTRAVENOUS ONCE
Status: COMPLETED | OUTPATIENT
Start: 2022-09-05 | End: 2022-09-05

## 2022-09-05 RX ORDER — HYDROCODONE BITARTRATE AND ACETAMINOPHEN 5; 325 MG/1; MG/1
1 TABLET ORAL EVERY 6 HOURS PRN
Qty: 12 TABLET | Refills: 0 | Status: SHIPPED | OUTPATIENT
Start: 2022-09-05 | End: 2022-09-05 | Stop reason: SDUPTHER

## 2022-09-05 RX ORDER — SULFAMETHOXAZOLE AND TRIMETHOPRIM 800; 160 MG/1; MG/1
1 TABLET ORAL 2 TIMES DAILY
Qty: 14 TABLET | Refills: 0 | Status: SHIPPED | OUTPATIENT
Start: 2022-09-05 | End: 2022-09-05 | Stop reason: SDUPTHER

## 2022-09-05 RX ORDER — ORPHENADRINE CITRATE 30 MG/ML
60 INJECTION INTRAMUSCULAR; INTRAVENOUS ONCE
Status: COMPLETED | OUTPATIENT
Start: 2022-09-05 | End: 2022-09-05

## 2022-09-05 RX ORDER — SULFAMETHOXAZOLE AND TRIMETHOPRIM 800; 160 MG/1; MG/1
1 TABLET ORAL 2 TIMES DAILY
Qty: 14 TABLET | Refills: 0 | Status: SHIPPED | OUTPATIENT
Start: 2022-09-05

## 2022-09-05 RX ORDER — KETOROLAC TROMETHAMINE 30 MG/ML
30 INJECTION, SOLUTION INTRAMUSCULAR; INTRAVENOUS ONCE
Status: COMPLETED | OUTPATIENT
Start: 2022-09-05 | End: 2022-09-05

## 2022-09-05 RX ORDER — HYDROCODONE BITARTRATE AND ACETAMINOPHEN 5; 325 MG/1; MG/1
1 TABLET ORAL ONCE
Status: COMPLETED | OUTPATIENT
Start: 2022-09-05 | End: 2022-09-05

## 2022-09-05 RX ORDER — HYDROCODONE BITARTRATE AND ACETAMINOPHEN 5; 325 MG/1; MG/1
1 TABLET ORAL EVERY 6 HOURS PRN
Qty: 12 TABLET | Refills: 0 | Status: SHIPPED | OUTPATIENT
Start: 2022-09-05

## 2022-09-05 RX ADMIN — ORPHENADRINE CITRATE 60 MG: 60 INJECTION INTRAMUSCULAR; INTRAVENOUS at 18:39

## 2022-09-05 RX ADMIN — SULFAMETHOXAZOLE AND TRIMETHOPRIM 1 TABLET: 800; 160 TABLET ORAL at 19:30

## 2022-09-05 RX ADMIN — MORPHINE SULFATE 4 MG: 4 INJECTION, SOLUTION INTRAMUSCULAR; INTRAVENOUS at 18:39

## 2022-09-05 RX ADMIN — KETOROLAC TROMETHAMINE 30 MG: 30 INJECTION, SOLUTION INTRAMUSCULAR; INTRAVENOUS at 18:39

## 2022-09-05 RX ADMIN — HYDROCODONE BITARTRATE AND ACETAMINOPHEN 1 TABLET: 5; 325 TABLET ORAL at 19:30

## 2022-10-05 ENCOUNTER — TELEPHONE (OUTPATIENT)
Dept: NEUROSURGERY | Facility: CLINIC | Age: 47
End: 2022-10-05

## 2022-10-15 ENCOUNTER — HOSPITAL ENCOUNTER (EMERGENCY)
Facility: HOSPITAL | Age: 47
Discharge: HOME | End: 2022-10-16
Attending: FAMILY MEDICINE
Payer: MEDICAID

## 2022-10-15 DIAGNOSIS — M54.41 ACUTE RIGHT-SIDED LOW BACK PAIN WITH RIGHT-SIDED SCIATICA: Primary | ICD-10-CM

## 2022-10-15 DIAGNOSIS — Z87.39 HISTORY OF HERNIATED INTERVERTEBRAL DISC: ICD-10-CM

## 2022-10-15 PROCEDURE — 96372 THER/PROPH/DIAG INJ SC/IM: CPT

## 2022-10-15 PROCEDURE — 99284 EMERGENCY DEPT VISIT MOD MDM: CPT

## 2022-10-15 RX ORDER — PROMETHAZINE HYDROCHLORIDE 25 MG/ML
12.5 INJECTION, SOLUTION INTRAMUSCULAR; INTRAVENOUS ONCE
Status: COMPLETED | OUTPATIENT
Start: 2022-10-16 | End: 2022-10-16

## 2022-10-15 ASSESSMENT — PAIN - FUNCTIONAL ASSESSMENT: PAIN_FUNCTIONAL_ASSESSMENT: 0-10

## 2022-10-15 ASSESSMENT — PAIN SCALES - GENERAL: PAINLEVEL_OUTOF10: 10

## 2022-10-15 ASSESSMENT — PAIN DESCRIPTION - LOCATION: LOCATION: BACK

## 2022-10-15 ASSESSMENT — PAIN DESCRIPTION - DESCRIPTORS: DESCRIPTORS: SHOOTING;SHARP

## 2022-10-15 ASSESSMENT — PAIN DESCRIPTION - ORIENTATION: ORIENTATION: RIGHT

## 2022-10-16 VITALS
BODY MASS INDEX: 19.26 KG/M2 | OXYGEN SATURATION: 96 % | RESPIRATION RATE: 22 BRPM | SYSTOLIC BLOOD PRESSURE: 117 MMHG | HEIGHT: 69 IN | HEART RATE: 75 BPM | DIASTOLIC BLOOD PRESSURE: 88 MMHG | TEMPERATURE: 97.9 F | WEIGHT: 130 LBS

## 2022-10-16 PROCEDURE — 6360000002 HC RX W HCPCS: Performed by: FAMILY MEDICINE

## 2022-10-16 RX ORDER — HYDROCODONE BITARTRATE AND ACETAMINOPHEN 5; 325 MG/1; MG/1
1-2 TABLET ORAL
Qty: 18 TABLET | Refills: 0 | Status: SHIPPED | OUTPATIENT
Start: 2022-10-16 | End: 2022-10-19

## 2022-10-16 RX ADMIN — HYDROMORPHONE HYDROCHLORIDE 1 MG: 1 INJECTION, SOLUTION INTRAMUSCULAR; INTRAVENOUS; SUBCUTANEOUS at 00:25

## 2022-10-16 RX ADMIN — PROMETHAZINE HYDROCHLORIDE 12.5 MG: 25 INJECTION INTRAMUSCULAR; INTRAVENOUS at 00:27

## 2022-10-16 ASSESSMENT — PAIN SCALES - GENERAL
PAINLEVEL_OUTOF10: 10
PAINLEVEL_OUTOF10: 6

## 2022-10-16 ASSESSMENT — ENCOUNTER SYMPTOMS: BACK PAIN: 1

## 2022-10-16 NOTE — ED NOTES
Pt informed of d/c instructions. Pt verbalizes understanding.       José Luis Carson RN  10/16/22 2929

## 2022-10-16 NOTE — ED PROVIDER NOTES
[address]  eMERGENCY dEPARTMENT eNCOUnter      Pt Name: Terrace Mcardle  MRN: [id number]  [place] 1975  Date of evaluation: 10/15/2022  Provider: Ladonna Trinh DO    CHIEF COMPLAINT       Chief Complaint   Patient presents with    Back Pain         HISTORY OF PRESENT ILLNESS   (Location/Symptom, Timing/Onset, Context/Setting, Quality, Duration, Modifying Factors, Severity)  Note limiting factors. Terrace Mcardle is a 55 y.o. male who presents to the emergency department for having back pain. Pt states that he has bad back, history of herniated disc, patient was in pain management and told them that he wanted to try take anything but narcotics. Pt was given a spine stimulator but it caused some trouble to the skin on the spine. Pt states that he saw neurosurgery and they said it was a 50/50 chance of getting worse with surgery. Pt said that he talked to his primary care and they said that they would give him pain medications. Pt said that he mopped and after 3 large swipes, his back \"went out\" and he started having severe pain to the right side of his back. Pain going down the right leg and had some weakness to his right leg for short time. Pt's pain got bad to point of needing medication. Pain 10/10, sharp, aching, constant with any movement. No numbness to groin/perineum, no loss of bowel or bladder. Nursing Notes were reviewed. REVIEW OF SYSTEMS    (2-9 systems for level 4, 10 or more forlevel 5)     Review of Systems   Musculoskeletal:  Positive for arthralgias, back pain and myalgias. Pain to right leg   All other systems reviewed and are negative.         PAST MEDICAL HISTORY     Past Medical History:   Diagnosis Date    Hepatitis C     Hepatitis C     Hypoglycemia          SURGICAL HISTORY       Past Surgical History:   Procedure Laterality Date    MOUTH SURGERY      NECK SURGERY      ROTATOR CUFF REPAIR      UPPER GASTROINTESTINAL ENDOSCOPY N/A 6/10/2019 EGD BIOPSY performed by Kaci Paul MD at [address]       Previous Medications    ALBUTEROL SULFATE HFA (VENTOLIN HFA) 108 (90 BASE) MCG/ACT INHALER    Inhale 2 puffs into the lungs 4 times daily as needed for Wheezing    ATENOLOL (TENORMIN) 25 MG TABLET    Take 25 mg by mouth daily    GABAPENTIN (NEURONTIN) 300 MG CAPSULE    Take 300 mg by mouth 4 times daily. IBUPROFEN (IBU) 800 MG TABLET    Take 1 tablet by mouth every 6 hours as needed for Pain    MIRTAZAPINE (REMERON SOL-TAB) 15 MG DISINTEGRATING TABLET    Take 15 mg by mouth nightly    ONDANSETRON (ZOFRAN ODT) 4 MG DISINTEGRATING TABLET    Take 1 tablet by mouth every 4 hours as needed for Nausea or Vomiting    PANTOPRAZOLE (PROTONIX) 40 MG TABLET    TAKE ONE TABLET BY MOUTH EVERY MORNING (BEFORE BREAKFAST)    SIMVASTATIN (ZOCOR) 40 MG TABLET    Take 40 mg by mouth nightly    TIZANIDINE (ZANAFLEX) 4 MG TABLET    Take 4 mg by mouth 2 times daily as needed        ALLERGIES     Patient has no known allergies. FAMILY HISTORY     No family history on file.        SOCIAL HISTORY       Social History     Socioeconomic History    Marital status: Single   Tobacco Use    Smoking status: Every Day     Packs/day: 0.50     Years: 29.00     Pack years: 14.50     Types: Cigarettes    Smokeless tobacco: Never   Vaping Use    Vaping Use: Never used   Substance and Sexual Activity    Alcohol use: No    Drug use: Yes     Types: Marijuana (Weed)       SCREENINGS    [place] Coma Scale  Eye Opening: Spontaneous  Best Verbal Response: Oriented  Best Motor Response: Obeys commands  [place] Coma Scale Score: 15        PHYSICAL EXAM    (up to 7 for level 4, 8 or more for level 5)     ED Triage Vitals   BP Temp Temp Source Heart Rate Resp SpO2 Height Weight   10/15/22 2231 10/15/22 2231 10/15/22 2231 10/15/22 2231 10/15/22 2231 10/15/22 2231 10/15/22 2237 10/15/22 2237   119/78 97.9 °F (36.6 °C) Oral 75 22 97 % 5' 9\" (1.753 m) 130 lb (59 kg) Physical Exam  Vitals and nursing note reviewed. Constitutional:       Comments: Pt laying there on his side in fetal position but calm and hurting in the room   HENT:      Head: Normocephalic. Eyes:      Extraocular Movements: Extraocular movements intact. Pupils: Pupils are equal, round, and reactive to light. Cardiovascular:      Rate and Rhythm: Normal rate and regular rhythm. Heart sounds: Normal heart sounds. Pulmonary:      Effort: Pulmonary effort is normal.      Breath sounds: Normal breath sounds. Musculoskeletal:         General: Tenderness present. Cervical back: Neck supple. Comments: No spinous process tenderness. Tenderness to right lower lumbar area and right paraspinal muscular tenderness. Slight right sciatic notch tenderness. No weakness to extremity   Skin:     General: Skin is warm and dry. Neurological:      Mental Status: He is alert and oriented to person, place, and time. Psychiatric:         Mood and Affect: Mood normal.         Behavior: Behavior normal.       DIAGNOSTIC RESULTS     EKG: All EKG's are interpreted by the Emergency Department Physician who either signs or Co-signs this chart in the absence of a cardiologist.    None    RADIOLOGY:   Non-plain film images such as CT, Ultrasound and MRI are read by the radiologist. Plain radiographic images are visualized andpreliminarily interpreted by the emergency physician with the below findings:    None    Interpretationper the Radiologist below, if available at the time of this note:    No orders to display         ED BEDSIDE ULTRASOUND:   Performed by ED Physician - none    LABS:  Labs Reviewed - No data to display    All other labs were within normal range or not returned as of this dictation.     EMERGENCY DEPARTMENT COURSE and DIFFERENTIAL DIAGNOSIS/MDM:   :    Vitals:    10/15/22 2319 10/15/22 2349 10/15/22 2359 10/16/22 0019   BP: 102/68 123/87 111/67 100/64   Pulse:       Resp:       Temp: TempSrc:       SpO2: 98% 99% 100% 100%   Weight:       Height:               CRITICAL CARE TIME   Total Critical Care time was 0 minutes, excluding separatelyreportable procedures. There was a high probability ofclinically significant/life threatening deterioration in the patient's condition which required my urgent intervention. CONSULTS:  None    PROCEDURES:  None    PROGRESS NOTES:    Pt with acute low back pain. Pt with long history of herniated disc with 50/50 operation improvement. Pt is in pain management but hasn't been taking narcotic medication. Will give IM pain shot and give him 3 days of pain medication to continue with primary care. Discussed about worsening signs and symptoms and cauda equina symptoms or worsening acute herniation    Is this patient to be included in the SEP-1 Core Measure due to severe sepsis or septic shock? No   Exclusion criteria - the patient is NOT to be included for SEP-1 Core Measure due to: Infection is not suspected     FINAL IMPRESSION      1. Acute right-sided low back pain with right-sided sciatica New Problem   2. History of herniated intervertebral disc New Problem         DISPOSITION/PLAN   DISPOSITION Decision To Discharge 10/16/2022 12:37:13 AM      PATIENT REFERRED TO:    Follow up with Dr. Vi Hernandez early next week if able. Pain meds sent to pharmacy. DISCHARGE MEDICATIONS:  New Prescriptions    HYDROCODONE-ACETAMINOPHEN (NORCO) 5-325 MG PER TABLET    Take 1-2 tablets by mouth every 4-6 hours as needed for Pain for up to 3 days. Intended supply: 3 days.  Take lowest dose possible to manage pain       (Please note that portions of this note were completed with a voice recognition program.  Efforts were made to edit the dictations but occasionallywords are mis-transcribed.)    Rizwana Sam DO (electronically signed)  Attending Emergency Physician          Rizwana Sam DO  10/16/22 0041

## 2022-10-16 NOTE — ED TRIAGE NOTES
Pt arrives to ED with c/o of right sided back/flank pain. Pt has a history of back pain and herniated disc. Pt says his pain is a 10/10 on pain scale. Pt is laying in the bed on his left side for comfort.

## 2022-11-05 NOTE — PATIENT INSTRUCTIONS
Education and Discharge Instructions:  Keep a list of your medicines with you at all times. Always bring a up to date list or the medication bottles when going to the doctor or hospital.   Always follow the directions on your medications unless the doctor or nurse has instructed you otherwise. Keep all medications out of reach of children. Store medicines according to the directions on the label. Seek emergency medical attention if you think you have used too much medication. A overdose can be fatal.  Don't share your medicines with anyone. It may harm them. Discard any unused or out dated medications. If you have any questions, ask your provider or pharmacist for more information. Be sure to keep all appointments for provider visits or tests. Please continue all your medications that the Provider has prescribed for you unless other [facility]    1. Mental Health Info and Treatment [phone]  2. Drug and Alcohol Detox Rehab treatment 24 hr [phone]  3. Stop Smoking Classes- [facility] [place]-[phone]  4. [address] Program- prescription lozlsjawox-[phone]  5. Hospice Care [phone]  6. Adult/Child Protection [phone]    [employer]: Your online connection to your health information. Download the [employer] at [employer]) or the [employer]	[place] (FRM Study Course). [address] from the list of providers. [employer] Help Desk: [phone]-BGCBA    [place]        We are committed to providing you with the best care possible. In order to help us achieve these goals please remember to bring all medications, herbal products, and over the counter supplements with you to each visit. If your provider has ordered testing for you, please be sure to follow up with our office if you have not received results within 7 days after the testing took place.      *If you receive a survey after visiting one of our offices, please take time to share your experience concerning your physician office visit. These surveys are confidential and no health information about you is shared.   We are eager to improve for you and we are counting on your feedback to help make that happen Patient discussed on morning rounds with Dr. Muller    Operation / Date: 11/2/22 Suprasternal I&D and wound vac placement  7/21/22 TEVAR with right groin cutdown and femoral artery  8/9/21 AVR, ascending/hemiarch replacement with frozen elephant  9/20/21 pec flap and closure      SUBJECTIVE ASSESSMENT:  76y Female seen and examined at bedside.  Patient with no complaints.  Patient denies chest pain, shortness of breath, nausea, vomiting.        Vital Signs Last 24 Hrs  T(C): 36.4 (05 Nov 2022 09:41), Max: 36.8 (04 Nov 2022 17:27)  T(F): 97.6 (05 Nov 2022 09:41), Max: 98.3 (04 Nov 2022 17:27)  HR: 64 (05 Nov 2022 08:47) (64 - 103)  BP: 138/63 (05 Nov 2022 08:47) (131/60 - 171/83)  BP(mean): 91 (05 Nov 2022 08:47) (91 - 115)  RR: 20 (05 Nov 2022 08:47) (18 - 22)  SpO2: 96% (05 Nov 2022 08:47) (93% - 99%)    Parameters below as of 05 Nov 2022 08:47  Patient On (Oxygen Delivery Method): nasal cannula  O2 Flow (L/min): 1    I&O's Detail    04 Nov 2022 07:01  -  05 Nov 2022 07:00  --------------------------------------------------------  IN:    IV PiggyBack: 550 mL    Oral Fluid: 240 mL  Total IN: 790 mL    OUT:    VAC (Vacuum Assisted Closure) System (mL): 80 mL    Voided (mL): 1550 mL  Total OUT: 1630 mL    Total NET: -840 mL          CHEST TUBE:  No.   FRANCHESKA DRAIN:  No.  EPICARDIAL WIRES: No.  TIE DOWNS: No.  REGALADO: No.    PHYSICAL EXAM:    GEN: NAD, looks comfortable  Psych: Mood appropriate  Neuro: A&Ox3.  No focal deficits.  Moving all extremities.   HEENT: No obvious abnormalities  CV: S1S2, regular, no murmurs appreciated.  No carotid bruits.  No JVD  Lungs: Clear B/L.  No wheezing, rales or rhonchi  ABD: Soft, non-tender, non-distended.  +Bowel sounds  EXT: Warm and well perfused.  trace to 1+ peripheral edema noted   Musculoskeletal: Moving all extremities with normal ROM, no joint swelling  PV: Pedal pulses palpable  Incisions: Incision at superior sternal pole is clean and dry with a wound vac in place with adequate suction.     LABS:                        7.7    8.41  )-----------( 75       ( 05 Nov 2022 06:51 )             23.7       COUMADIN:  No. REASON: .        11-05    135  |  100  |  15  ----------------------------<  179<H>  3.6   |  26  |  0.77    Ca    8.6      05 Nov 2022 06:51  Mg     2.0     11-05            MEDICATIONS  (STANDING):  amLODIPine   Tablet 2.5 milliGRAM(s) Oral daily  aspirin  chewable 81 milliGRAM(s) Oral daily  atorvastatin 10 milliGRAM(s) Oral at bedtime  budesonide  80 MICROgram(s)/formoterol 4.5 MICROgram(s) Inhaler 2 Puff(s) Inhalation every 12 hours  cefepime   IVPB 2000 milliGRAM(s) IV Intermittent every 12 hours  dextrose 5%. 1000 milliLiter(s) (50 mL/Hr) IV Continuous <Continuous>  dextrose 5%. 1000 milliLiter(s) (100 mL/Hr) IV Continuous <Continuous>  dextrose 50% Injectable 25 Gram(s) IV Push once  dextrose 50% Injectable 25 Gram(s) IV Push once  dextrose 50% Injectable 12.5 Gram(s) IV Push once  famotidine    Tablet 20 milliGRAM(s) Oral daily  furosemide    Tablet 20 milliGRAM(s) Oral daily  glucagon  Injectable 1 milliGRAM(s) IntraMuscular once  hydrALAZINE 10 milliGRAM(s) Oral every 8 hours  insulin lispro (ADMELOG) corrective regimen sliding scale   SubCutaneous Before meals and at bedtime  lactated ringers. 1000 milliLiter(s) (50 mL/Hr) IV Continuous <Continuous>  lidocaine   4% Patch 1 Patch Transdermal daily  metoprolol tartrate 50 milliGRAM(s) Oral two times a day  potassium chloride    Tablet ER 10 milliEquivalent(s) Oral daily    MEDICATIONS  (PRN):  acetaminophen     Tablet .. 650 milliGRAM(s) Oral every 6 hours PRN Mild Pain (1 - 3)  dextrose Oral Gel 15 Gram(s) Oral once PRN Blood Glucose LESS THAN 70 milliGRAM(s)/deciliter        RADIOLOGY & ADDITIONAL TESTS:

## 2023-04-20 ENCOUNTER — OFFICE VISIT (OUTPATIENT)
Dept: NEUROSURGERY | Facility: CLINIC | Age: 48
End: 2023-04-20
Payer: MEDICAID

## 2023-04-20 VITALS
TEMPERATURE: 97.5 F | BODY MASS INDEX: 21.36 KG/M2 | HEIGHT: 69 IN | WEIGHT: 144.2 LBS | SYSTOLIC BLOOD PRESSURE: 143 MMHG | DIASTOLIC BLOOD PRESSURE: 97 MMHG | HEART RATE: 87 BPM

## 2023-04-20 DIAGNOSIS — M51.16 LUMBAR DISC HERNIATION WITH RADICULOPATHY: Primary | ICD-10-CM

## 2023-04-20 PROCEDURE — 99213 OFFICE O/P EST LOW 20 MIN: CPT | Performed by: PHYSICIAN ASSISTANT

## 2023-04-20 RX ORDER — ONDANSETRON 8 MG/1
TABLET, ORALLY DISINTEGRATING ORAL
COMMUNITY
Start: 2023-04-11

## 2023-04-20 RX ORDER — GABAPENTIN 800 MG/1
TABLET ORAL 3 TIMES DAILY
COMMUNITY
Start: 2023-04-06

## 2023-04-20 RX ORDER — HYDROCODONE BITARTRATE AND ACETAMINOPHEN 7.5; 325 MG/1; MG/1
TABLET ORAL
COMMUNITY
Start: 2023-03-01

## 2023-04-20 RX ORDER — MIRTAZAPINE 15 MG/1
15 TABLET, ORALLY DISINTEGRATING ORAL
COMMUNITY
Start: 2022-04-13

## 2023-05-15 ENCOUNTER — TELEPHONE (OUTPATIENT)
Dept: NEUROSURGERY | Facility: CLINIC | Age: 48
End: 2023-05-15

## 2023-05-17 ENCOUNTER — TELEPHONE (OUTPATIENT)
Dept: NEUROSURGERY | Facility: CLINIC | Age: 48
End: 2023-05-17
Payer: MEDICAID

## 2023-05-17 DIAGNOSIS — M51.9 LUMBOSACRAL DISC DISEASE: Primary | ICD-10-CM

## 2023-05-17 DIAGNOSIS — M51.16 LUMBAR DISC HERNIATION WITH RADICULOPATHY: ICD-10-CM

## 2023-07-25 ENCOUNTER — TELEPHONE (OUTPATIENT)
Dept: NEUROSURGERY | Facility: CLINIC | Age: 48
End: 2023-07-25

## 2023-10-24 ENCOUNTER — HOSPITAL ENCOUNTER (OUTPATIENT)
Dept: MRI IMAGING | Facility: HOSPITAL | Age: 48
Discharge: HOME | End: 2023-10-24
Admitting: PHYSICIAN ASSISTANT
Payer: MEDICAID

## 2023-10-24 DIAGNOSIS — M51.16 LUMBAR DISC HERNIATION WITH RADICULOPATHY: ICD-10-CM

## 2023-10-24 PROCEDURE — A9577 INJ MULTIHANCE: HCPCS | Performed by: PHYSICIAN ASSISTANT

## 2023-10-24 PROCEDURE — 72158 MRI LUMBAR SPINE W/O & W/DYE: CPT

## 2023-10-24 PROCEDURE — 0 GADOBENATE DIMEGLUMINE 529 MG/ML SOLUTION: Performed by: PHYSICIAN ASSISTANT

## 2023-10-24 RX ADMIN — GADOBENATE DIMEGLUMINE 13 ML: 529 INJECTION, SOLUTION INTRAVENOUS at 10:52

## 2023-10-30 ENCOUNTER — TELEMEDICINE (OUTPATIENT)
Dept: NEUROSURGERY | Facility: CLINIC | Age: 48
End: 2023-10-30
Payer: MEDICAID

## 2023-10-30 DIAGNOSIS — M51.16 LUMBAR DISC HERNIATION WITH RADICULOPATHY: Primary | ICD-10-CM

## 2023-10-30 PROCEDURE — 99213 OFFICE O/P EST LOW 20 MIN: CPT | Performed by: PHYSICIAN ASSISTANT

## 2023-11-03 ENCOUNTER — TELEPHONE (OUTPATIENT)
Dept: NEUROSURGERY | Facility: CLINIC | Age: 48
End: 2023-11-03
Payer: MEDICAID

## 2024-01-03 NOTE — TELEPHONE ENCOUNTER
From: Rosalio Hollingsworth  To: Willy SEAN Armijo CNP  Sent: 5/21/2019 3:35 PM EDT  Subject: Non-Urgent Medical Question    I think I need an endoscopy. I have excruciating pain in my stomach, no nausea. I cannot swallow solid food. My throat feels very narrow. Zofran is not helping. It might all be due to nerves, as I feel I may be having a nervous breakdown. General

## 2024-02-23 ENCOUNTER — TRANSCRIBE ORDERS (OUTPATIENT)
Dept: GENERAL RADIOLOGY | Facility: HOSPITAL | Age: 49
End: 2024-02-23

## 2024-02-23 DIAGNOSIS — M51.16 INTERVERTEBRAL DISC DISORDER WITH RADICULOPATHY OF LUMBAR REGION: Primary | ICD-10-CM

## 2024-03-11 ENCOUNTER — HOSPITAL ENCOUNTER (OUTPATIENT)
Dept: MRI IMAGING | Facility: HOSPITAL | Age: 49
Discharge: HOME | End: 2024-03-11
Payer: MEDICAID

## 2024-03-11 DIAGNOSIS — M51.16 INTERVERTEBRAL DISC DISORDER WITH RADICULOPATHY OF LUMBAR REGION: ICD-10-CM

## 2024-03-11 PROCEDURE — 72148 MRI LUMBAR SPINE W/O DYE: CPT

## 2024-05-06 ENCOUNTER — APPOINTMENT (OUTPATIENT)
Dept: ULTRASOUND IMAGING | Facility: HOSPITAL | Age: 49
End: 2024-05-06
Payer: MEDICAID

## 2024-05-06 ENCOUNTER — HOSPITAL ENCOUNTER (OUTPATIENT)
Facility: HOSPITAL | Age: 49
Discharge: HOME | End: 2024-05-06
Payer: MEDICAID

## 2024-05-06 PROCEDURE — 83883 ASSAY NEPHELOMETRY NOT SPEC: CPT

## 2024-05-06 PROCEDURE — 84460 ALANINE AMINO (ALT) (SGPT): CPT

## 2024-05-06 PROCEDURE — 84450 TRANSFERASE (AST) (SGOT): CPT

## 2024-05-06 PROCEDURE — 84520 ASSAY OF UREA NITROGEN: CPT

## 2024-05-06 PROCEDURE — 82977 ASSAY OF GGT: CPT

## 2024-05-09 LAB — MISCELLANEOUS LAB TEST ORDER: ABNORMAL

## 2024-06-14 ENCOUNTER — HOSPITAL ENCOUNTER (OUTPATIENT)
Facility: HOSPITAL | Age: 49
Discharge: HOME | End: 2024-06-14
Payer: MEDICAID

## 2024-06-14 LAB
ALBUMIN SERPL-MCNC: 4.2 G/DL (ref 3.4–4.8)
ALP SERPL-CCNC: 75 U/L (ref 25–100)
ALT SERPL-CCNC: 14 U/L (ref 4–36)
ANION GAP SERPL CALCULATED.3IONS-SCNC: 8 MMOL/L (ref 3–16)
AST SERPL-CCNC: 11 U/L (ref 8–33)
BASOPHILS # BLD: 0.1 K/UL (ref 0–0.1)
BASOPHILS NFR BLD: 1 %
BILIRUB SERPL-MCNC: 0.3 MG/DL (ref 0.3–1.2)
BUN SERPL-MCNC: 18 MG/DL (ref 6–20)
CALCIUM SERPL-MCNC: 8.8 MG/DL (ref 8.5–10.5)
CHLORIDE SERPL-SCNC: 103 MMOL/L (ref 98–107)
CO2 SERPL-SCNC: 29 MMOL/L (ref 20–30)
CREAT SERPL-MCNC: 0.9 MG/DL (ref 0.4–1.2)
EOSINOPHIL # BLD: 1.9 K/UL (ref 0–0.4)
EOSINOPHIL NFR BLD: 23.7 %
ERYTHROCYTE [DISTWIDTH] IN BLOOD BY AUTOMATED COUNT: 11.9 % (ref 11–16)
GFR SERPLBLD CREATININE-BSD FMLA CKD-EPI: >90 ML/MIN/{1.73_M2}
GLOBULIN SER CALC-MCNC: 2.5 G/DL
GLUCOSE SERPL-MCNC: 84 MG/DL (ref 74–106)
HCT VFR BLD AUTO: 43.9 % (ref 40–54)
HGB BLD-MCNC: 14.9 G/DL (ref 13–18)
IMM GRANULOCYTES # BLD: 0 K/UL
IMM GRANULOCYTES NFR BLD: 0.3 % (ref 0–5)
INR PPP: 0.93 (ref 0.87–1.11)
LYMPHOCYTES # BLD: 2.6 K/UL (ref 1.5–4)
LYMPHOCYTES NFR BLD: 32 %
MCH RBC QN AUTO: 30.5 PG (ref 27–32)
MCHC RBC AUTO-ENTMCNC: 33.9 G/DL (ref 31–35)
MCV RBC AUTO: 90 FL (ref 80–100)
MONOCYTES # BLD: 0.4 K/UL (ref 0.2–0.8)
MONOCYTES NFR BLD: 5.2 %
NEUTROPHILS # BLD: 3 K/UL (ref 2–7.5)
NEUTS SEG NFR BLD: 37.8 %
PLATELET # BLD AUTO: 247 K/UL (ref 150–400)
PMV BLD AUTO: 9.9 FL (ref 6–10)
POTASSIUM SERPL-SCNC: 4 MMOL/L (ref 3.4–5.1)
PROT SERPL-MCNC: 6.7 G/DL (ref 6.4–8.3)
PROTHROMBIN TIME: 12.3 SEC (ref 11.8–14.2)
RBC # BLD AUTO: 4.88 M/UL (ref 4.5–6)
SODIUM SERPL-SCNC: 140 MMOL/L (ref 136–145)
WBC # BLD AUTO: 8 K/UL (ref 4–11)

## 2024-06-14 PROCEDURE — 87522 HEPATITIS C REVRS TRNSCRPJ: CPT

## 2024-06-14 PROCEDURE — 85025 COMPLETE CBC W/AUTO DIFF WBC: CPT

## 2024-06-14 PROCEDURE — 36415 COLL VENOUS BLD VENIPUNCTURE: CPT

## 2024-06-14 PROCEDURE — 85610 PROTHROMBIN TIME: CPT

## 2024-06-14 PROCEDURE — 80053 COMPREHEN METABOLIC PANEL: CPT

## 2024-06-17 LAB
HCV RNA SERPL NAA+PROBE-ACNC: NOT DETECTED IU/ML
HCV RNA SERPL NAA+PROBE-LOG IU: NOT DETECTED LOG IU/ML
HCV RNA SERPL QL NAA+PROBE: NOT DETECTED

## 2024-08-02 ENCOUNTER — TELEPHONE (OUTPATIENT)
Dept: NEUROSURGERY | Facility: CLINIC | Age: 49
End: 2024-08-02
Payer: MEDICAID

## 2024-08-02 ENCOUNTER — APPOINTMENT (OUTPATIENT)
Dept: GENERAL RADIOLOGY | Facility: HOSPITAL | Age: 49
End: 2024-08-02
Payer: MEDICAID

## 2024-08-02 ENCOUNTER — HOSPITAL ENCOUNTER (EMERGENCY)
Facility: HOSPITAL | Age: 49
Discharge: HOME | End: 2024-08-02
Attending: EMERGENCY MEDICINE
Payer: MEDICAID

## 2024-08-02 VITALS
SYSTOLIC BLOOD PRESSURE: 126 MMHG | HEIGHT: 69 IN | BODY MASS INDEX: 21.48 KG/M2 | DIASTOLIC BLOOD PRESSURE: 89 MMHG | RESPIRATION RATE: 20 BRPM | TEMPERATURE: 97.7 F | OXYGEN SATURATION: 97 % | WEIGHT: 145 LBS

## 2024-08-02 DIAGNOSIS — S46.911A STRAIN OF RIGHT SHOULDER, INITIAL ENCOUNTER: Primary | ICD-10-CM

## 2024-08-02 PROCEDURE — 73030 X-RAY EXAM OF SHOULDER: CPT

## 2024-08-02 PROCEDURE — 6360000002 HC RX W HCPCS: Performed by: EMERGENCY MEDICINE

## 2024-08-02 PROCEDURE — 96372 THER/PROPH/DIAG INJ SC/IM: CPT

## 2024-08-02 PROCEDURE — 99284 EMERGENCY DEPT VISIT MOD MDM: CPT

## 2024-08-02 PROCEDURE — 72040 X-RAY EXAM NECK SPINE 2-3 VW: CPT

## 2024-08-02 RX ORDER — OMEPRAZOLE 10 MG/1
10 CAPSULE, DELAYED RELEASE ORAL DAILY
COMMUNITY

## 2024-08-02 RX ORDER — BUPROPION HYDROCHLORIDE 75 MG/1
75 TABLET ORAL 2 TIMES DAILY
COMMUNITY

## 2024-08-02 RX ORDER — KETOROLAC TROMETHAMINE 15 MG/ML
15 INJECTION, SOLUTION INTRAMUSCULAR; INTRAVENOUS ONCE
Status: COMPLETED | OUTPATIENT
Start: 2024-08-02 | End: 2024-08-02

## 2024-08-02 RX ORDER — KETOROLAC TROMETHAMINE 10 MG/1
10 TABLET, FILM COATED ORAL EVERY 6 HOURS PRN
Qty: 20 TABLET | Refills: 0 | Status: SHIPPED | OUTPATIENT
Start: 2024-08-02 | End: 2025-08-02

## 2024-08-02 RX ADMIN — KETOROLAC TROMETHAMINE 15 MG: 15 INJECTION, SOLUTION INTRAMUSCULAR; INTRAVENOUS at 09:52

## 2024-08-02 ASSESSMENT — LIFESTYLE VARIABLES
HOW OFTEN DO YOU HAVE A DRINK CONTAINING ALCOHOL: NEVER
HOW MANY STANDARD DRINKS CONTAINING ALCOHOL DO YOU HAVE ON A TYPICAL DAY: PATIENT DOES NOT DRINK

## 2024-08-02 ASSESSMENT — PAIN DESCRIPTION - PAIN TYPE: TYPE: ACUTE PAIN

## 2024-08-02 ASSESSMENT — PAIN DESCRIPTION - LOCATION: LOCATION: SHOULDER

## 2024-08-02 ASSESSMENT — PAIN DESCRIPTION - ORIENTATION: ORIENTATION: RIGHT

## 2024-08-02 ASSESSMENT — PAIN SCALES - GENERAL: PAINLEVEL_OUTOF10: 10

## 2024-08-02 ASSESSMENT — PAIN - FUNCTIONAL ASSESSMENT: PAIN_FUNCTIONAL_ASSESSMENT: 0-10

## 2024-08-02 NOTE — ED NOTES
Patient given discharge instructions. Educated on new medication. No further questions or concerns at this time. Patient ambulatory with [facility] 15 at time of discharge.

## 2024-08-02 NOTE — ED PROVIDER NOTES
.        [facility]  EMERGENCY DEPARTMENT ENCOUNTER        Pt Name: Yunior Jack  MRN: [id number]  Birthdate 1975  Date of evaluation: 8/2/2024  Provider: Indira Torres MD  PCP: Edward Javed APRN - NP  Note Started: 9:41 AM EDT 8/2/24    CHIEF COMPLAINT       Chief Complaint   Patient presents with    Shoulder Pain     Right        HISTORY OF PRESENT ILLNESS: 1 or more Elements     History from : Patient    Limitations to history : None    Yunior Jack is a 48 y.o. male who presents complaining of right shoulder pain he has a history of shoulder surgery 4 years ago for rotator cuff injury however this pain just started in the last 24 hours the only thing he can think of is he was reaching under a bed and suddenly felt a \"tweak\" in his shoulder since that time he has been getting increased pain in the posterior aspect of the shoulder and neck.  Of note patient also has had cervical disc surgery in the past for radicular pain which she says is very much like what he is currently experiencing.  He denies any acute numbness denies any weakness he did take methadone and Neurontin about an hour and a half ago which she is prescribed from pain clinic.    Nursing Notes were all reviewed and agreed with or any disagreements were addressed in the HPI.    REVIEW OF SYSTEMS :      Review of Systems     systems reviewed and negative except as in HPI/MDM    SURGICAL HISTORY     Past Surgical History:   Procedure Laterality Date    MOUTH SURGERY      NECK SURGERY      ROTATOR CUFF REPAIR      UPPER GASTROINTESTINAL ENDOSCOPY N/A 6/10/2019    EGD BIOPSY performed by Ben Robb MD at [facility]COPY       CURRENTMEDICATIONS       Previous Medications    ALBUTEROL SULFATE HFA (VENTOLIN HFA) 108 (90 BASE) MCG/ACT INHALER    Inhale 2 puffs into the lungs 4 times daily as needed for Wheezing    ATENOLOL (TENORMIN) 25 MG TABLET    Take 25 mg by mouth daily    BUPROPION (WELLBUTRIN) 75 MG

## 2024-08-02 NOTE — ED TRIAGE NOTES
Patient presents to the ED via [facility] with c/o right shoulder pain that started yesterday after reaching for something under his bed. Patient states the pain feels similar to when he had rotator cuff surgery in 2020.

## 2024-08-07 ENCOUNTER — OFFICE VISIT (OUTPATIENT)
Dept: NEUROSURGERY | Facility: CLINIC | Age: 49
End: 2024-08-07
Payer: MEDICAID

## 2024-08-07 VITALS
HEIGHT: 69 IN | BODY MASS INDEX: 20.44 KG/M2 | DIASTOLIC BLOOD PRESSURE: 82 MMHG | SYSTOLIC BLOOD PRESSURE: 124 MMHG | WEIGHT: 138 LBS | TEMPERATURE: 97.7 F

## 2024-08-07 DIAGNOSIS — M54.12 CERVICAL RADICULOPATHY AT C5: Primary | ICD-10-CM

## 2024-08-07 PROCEDURE — 99214 OFFICE O/P EST MOD 30 MIN: CPT | Performed by: PHYSICIAN ASSISTANT

## 2024-08-07 RX ORDER — VELPATASVIR AND SOFOSBUVIR 100; 400 MG/1; MG/1
1 TABLET, FILM COATED ORAL DAILY
COMMUNITY
Start: 2024-05-17

## 2024-08-07 RX ORDER — METHADONE HYDROCHLORIDE 5 MG/1
TABLET ORAL
COMMUNITY
Start: 2024-07-25

## 2024-08-07 RX ORDER — ONDANSETRON 4 MG/1
4 TABLET, FILM COATED ORAL
COMMUNITY

## 2024-08-07 RX ORDER — OMEPRAZOLE 20 MG/1
20 CAPSULE, DELAYED RELEASE ORAL DAILY
COMMUNITY
Start: 2024-05-17

## 2024-08-19 ENCOUNTER — HOSPITAL ENCOUNTER (OUTPATIENT)
Dept: GENERAL RADIOLOGY | Facility: HOSPITAL | Age: 49
Discharge: HOME | End: 2024-08-19
Payer: MEDICAID

## 2024-08-19 ENCOUNTER — HOSPITAL ENCOUNTER (EMERGENCY)
Facility: HOSPITAL | Age: 49
Discharge: HOME | End: 2024-08-19
Attending: HOSPITALIST
Payer: MEDICAID

## 2024-08-19 ENCOUNTER — HOSPITAL ENCOUNTER (OUTPATIENT)
Dept: MRI IMAGING | Facility: HOSPITAL | Age: 49
Discharge: HOME | End: 2024-08-19
Payer: MEDICAID

## 2024-08-19 VITALS
HEART RATE: 90 BPM | SYSTOLIC BLOOD PRESSURE: 140 MMHG | BODY MASS INDEX: 19.26 KG/M2 | RESPIRATION RATE: 18 BRPM | TEMPERATURE: 98.6 F | DIASTOLIC BLOOD PRESSURE: 99 MMHG | HEIGHT: 69 IN | WEIGHT: 130 LBS | OXYGEN SATURATION: 100 %

## 2024-08-19 DIAGNOSIS — F11.93 OPIOID WITHDRAWAL (HCC): Primary | ICD-10-CM

## 2024-08-19 DIAGNOSIS — M54.12 CERVICAL RADICULOPATHY: ICD-10-CM

## 2024-08-19 DIAGNOSIS — M54.12 CERVICAL RADICULITIS: ICD-10-CM

## 2024-08-19 PROCEDURE — 72040 X-RAY EXAM NECK SPINE 2-3 VW: CPT

## 2024-08-19 PROCEDURE — 99283 EMERGENCY DEPT VISIT LOW MDM: CPT

## 2024-08-19 PROCEDURE — 6370000000 HC RX 637 (ALT 250 FOR IP): Performed by: HOSPITALIST

## 2024-08-19 PROCEDURE — 72141 MRI NECK SPINE W/O DYE: CPT

## 2024-08-19 RX ORDER — METHADONE HYDROCHLORIDE 10 MG/1
10 TABLET ORAL ONCE
Status: COMPLETED | OUTPATIENT
Start: 2024-08-19 | End: 2024-08-19

## 2024-08-19 RX ADMIN — METHADONE HYDROCHLORIDE 10 MG: 10 TABLET ORAL at 12:11

## 2024-08-19 ASSESSMENT — PAIN DESCRIPTION - DESCRIPTORS: DESCRIPTORS: SHOOTING

## 2024-08-19 ASSESSMENT — PAIN - FUNCTIONAL ASSESSMENT
PAIN_FUNCTIONAL_ASSESSMENT: PREVENTS OR INTERFERES SOME ACTIVE ACTIVITIES AND ADLS
PAIN_FUNCTIONAL_ASSESSMENT: 0-10

## 2024-08-19 ASSESSMENT — LIFESTYLE VARIABLES: HOW OFTEN DO YOU HAVE A DRINK CONTAINING ALCOHOL: NEVER

## 2024-08-19 ASSESSMENT — PAIN SCALES - GENERAL
PAINLEVEL_OUTOF10: 8
PAINLEVEL_OUTOF10: 9

## 2024-08-19 ASSESSMENT — PAIN DESCRIPTION - LOCATION
LOCATION: NECK;ARM
LOCATION: ARM

## 2024-08-19 ASSESSMENT — PAIN DESCRIPTION - ORIENTATION
ORIENTATION: RIGHT
ORIENTATION: RIGHT

## 2024-08-19 ASSESSMENT — PAIN DESCRIPTION - PAIN TYPE: TYPE: CHRONIC PAIN;ACUTE PAIN

## 2024-08-19 NOTE — ED TRIAGE NOTES
Pt states he came to the hospital today for an mri which is scheduled for 1200.  He came to er for withdrawal from pain meds.  He states he takes methadone 10mg 3 x daily but has been taking more than prescribed because of his acute pinched nerve (which he is getting mri for).  He last took his pain med on Saturday and is not scheduled to see pain clinic until Thursday.  He c/o shaking and nausea in addition to his pain 8/10 at this time.

## 2024-08-19 NOTE — ED NOTES
Reviewed discharge plan with Yunior Jack.  Encouraged him to f/u with Edward Javed APRN - NP and he understood.  NAD noted on discharge, gait steady.  D/c'd by tra em      Electronically signed by Katia Raines RN on 8/19/2024 at 12:18 PM

## 2024-08-28 ENCOUNTER — HOSPITAL ENCOUNTER (EMERGENCY)
Facility: HOSPITAL | Age: 49
Discharge: HOME | End: 2024-08-28
Attending: EMERGENCY MEDICINE
Payer: MEDICAID

## 2024-08-28 VITALS
HEART RATE: 78 BPM | OXYGEN SATURATION: 94 % | HEIGHT: 69 IN | TEMPERATURE: 97.8 F | WEIGHT: 130 LBS | RESPIRATION RATE: 16 BRPM | SYSTOLIC BLOOD PRESSURE: 132 MMHG | DIASTOLIC BLOOD PRESSURE: 77 MMHG | BODY MASS INDEX: 19.26 KG/M2

## 2024-08-28 DIAGNOSIS — R11.2 NAUSEA AND VOMITING, UNSPECIFIED VOMITING TYPE: Primary | ICD-10-CM

## 2024-08-28 LAB
ALBUMIN SERPL-MCNC: 4.6 G/DL (ref 3.5–5.2)
ALBUMIN/GLOB SERPL: 1.4 G/DL
ALP SERPL-CCNC: 83 U/L (ref 39–117)
ALT SERPL W P-5'-P-CCNC: 12 U/L (ref 1–41)
AMPHET+METHAMPHET UR QL: NEGATIVE
AMPHETAMINES UR QL: NEGATIVE
ANION GAP SERPL CALCULATED.3IONS-SCNC: 11 MMOL/L (ref 5–15)
AST SERPL-CCNC: 17 U/L (ref 1–40)
BACTERIA UR QL AUTO: NORMAL /HPF
BARBITURATES UR QL SCN: NEGATIVE
BASOPHILS # BLD AUTO: 0.04 10*3/MM3 (ref 0–0.2)
BASOPHILS NFR BLD AUTO: 0.5 % (ref 0–1.5)
BENZODIAZ UR QL SCN: NEGATIVE
BILIRUB SERPL-MCNC: 0.4 MG/DL (ref 0–1.2)
BILIRUB UR QL STRIP: NEGATIVE
BUN SERPL-MCNC: 14 MG/DL (ref 6–20)
BUN/CREAT SERPL: 15.9 (ref 7–25)
BUPRENORPHINE SERPL-MCNC: NEGATIVE NG/ML
CALCIUM SPEC-SCNC: 9.9 MG/DL (ref 8.6–10.5)
CANNABINOIDS SERPL QL: NEGATIVE
CHLORIDE SERPL-SCNC: 99 MMOL/L (ref 98–107)
CLARITY UR: ABNORMAL
CO2 SERPL-SCNC: 30 MMOL/L (ref 22–29)
COCAINE UR QL: NEGATIVE
COLOR UR: YELLOW
CREAT SERPL-MCNC: 0.88 MG/DL (ref 0.76–1.27)
DEPRECATED RDW RBC AUTO: 39.5 FL (ref 37–54)
EGFRCR SERPLBLD CKD-EPI 2021: 106.1 ML/MIN/1.73
EOSINOPHIL # BLD AUTO: 0.02 10*3/MM3 (ref 0–0.4)
EOSINOPHIL NFR BLD AUTO: 0.2 % (ref 0.3–6.2)
ERYTHROCYTE [DISTWIDTH] IN BLOOD BY AUTOMATED COUNT: 11.8 % (ref 12.3–15.4)
FENTANYL UR-MCNC: POSITIVE NG/ML
GLOBULIN UR ELPH-MCNC: 3.3 GM/DL
GLUCOSE SERPL-MCNC: 140 MG/DL (ref 65–99)
GLUCOSE UR STRIP-MCNC: NEGATIVE MG/DL
HCT VFR BLD AUTO: 47.7 % (ref 37.5–51)
HGB BLD-MCNC: 15.7 G/DL (ref 13–17.7)
HGB UR QL STRIP.AUTO: NEGATIVE
HOLD SPECIMEN: NORMAL
HYALINE CASTS UR QL AUTO: NORMAL /LPF
IMM GRANULOCYTES # BLD AUTO: 0.05 10*3/MM3 (ref 0–0.05)
IMM GRANULOCYTES NFR BLD AUTO: 0.6 % (ref 0–0.5)
KETONES UR QL STRIP: ABNORMAL
LEUKOCYTE ESTERASE UR QL STRIP.AUTO: NEGATIVE
LIPASE SERPL-CCNC: 23 U/L (ref 13–60)
LYMPHOCYTES # BLD AUTO: 0.71 10*3/MM3 (ref 0.7–3.1)
LYMPHOCYTES NFR BLD AUTO: 8.3 % (ref 19.6–45.3)
MCH RBC QN AUTO: 30.2 PG (ref 26.6–33)
MCHC RBC AUTO-ENTMCNC: 32.9 G/DL (ref 31.5–35.7)
MCV RBC AUTO: 91.7 FL (ref 79–97)
METHADONE UR QL SCN: POSITIVE
MONOCYTES # BLD AUTO: 0.18 10*3/MM3 (ref 0.1–0.9)
MONOCYTES NFR BLD AUTO: 2.1 % (ref 5–12)
NEUTROPHILS NFR BLD AUTO: 7.59 10*3/MM3 (ref 1.7–7)
NEUTROPHILS NFR BLD AUTO: 88.3 % (ref 42.7–76)
NITRITE UR QL STRIP: NEGATIVE
NRBC BLD AUTO-RTO: 0 /100 WBC (ref 0–0.2)
OPIATES UR QL: NEGATIVE
OXYCODONE UR QL SCN: NEGATIVE
PCP UR QL SCN: NEGATIVE
PH UR STRIP.AUTO: 8 [PH] (ref 5–8)
PLATELET # BLD AUTO: 298 10*3/MM3 (ref 140–450)
PMV BLD AUTO: 9.7 FL (ref 6–12)
POTASSIUM SERPL-SCNC: 4.4 MMOL/L (ref 3.5–5.2)
PROT SERPL-MCNC: 7.9 G/DL (ref 6–8.5)
PROT UR QL STRIP: ABNORMAL
RBC # BLD AUTO: 5.2 10*6/MM3 (ref 4.14–5.8)
RBC # UR STRIP: NORMAL /HPF
REF LAB TEST METHOD: NORMAL
SODIUM SERPL-SCNC: 140 MMOL/L (ref 136–145)
SP GR UR STRIP: 1.02 (ref 1–1.03)
SQUAMOUS #/AREA URNS HPF: NORMAL /HPF
TRICYCLICS UR QL SCN: NEGATIVE
UROBILINOGEN UR QL STRIP: ABNORMAL
WBC # UR STRIP: NORMAL /HPF
WBC NRBC COR # BLD AUTO: 8.59 10*3/MM3 (ref 3.4–10.8)
WHOLE BLOOD HOLD COAG: NORMAL
WHOLE BLOOD HOLD SPECIMEN: NORMAL

## 2024-08-28 PROCEDURE — 83690 ASSAY OF LIPASE: CPT | Performed by: EMERGENCY MEDICINE

## 2024-08-28 PROCEDURE — 85025 COMPLETE CBC W/AUTO DIFF WBC: CPT | Performed by: EMERGENCY MEDICINE

## 2024-08-28 PROCEDURE — 81001 URINALYSIS AUTO W/SCOPE: CPT | Performed by: EMERGENCY MEDICINE

## 2024-08-28 PROCEDURE — 80307 DRUG TEST PRSMV CHEM ANLYZR: CPT | Performed by: PHYSICIAN ASSISTANT

## 2024-08-28 PROCEDURE — 25810000003 SODIUM CHLORIDE 0.9 % SOLUTION: Performed by: PHYSICIAN ASSISTANT

## 2024-08-28 PROCEDURE — 25010000002 ONDANSETRON PER 1 MG: Performed by: PHYSICIAN ASSISTANT

## 2024-08-28 PROCEDURE — 96374 THER/PROPH/DIAG INJ IV PUSH: CPT

## 2024-08-28 PROCEDURE — 99283 EMERGENCY DEPT VISIT LOW MDM: CPT

## 2024-08-28 PROCEDURE — 80053 COMPREHEN METABOLIC PANEL: CPT | Performed by: EMERGENCY MEDICINE

## 2024-08-28 RX ORDER — ONDANSETRON 2 MG/ML
4 INJECTION INTRAMUSCULAR; INTRAVENOUS ONCE
Status: COMPLETED | OUTPATIENT
Start: 2024-08-28 | End: 2024-08-28

## 2024-08-28 RX ORDER — ONDANSETRON 4 MG/1
4 TABLET, ORALLY DISINTEGRATING ORAL EVERY 8 HOURS PRN
Qty: 12 TABLET | Refills: 0 | Status: SHIPPED | OUTPATIENT
Start: 2024-08-28

## 2024-08-28 RX ORDER — SODIUM CHLORIDE 9 MG/ML
10 INJECTION, SOLUTION INTRAMUSCULAR; INTRAVENOUS; SUBCUTANEOUS AS NEEDED
Status: DISCONTINUED | OUTPATIENT
Start: 2024-08-28 | End: 2024-08-28 | Stop reason: HOSPADM

## 2024-08-28 RX ADMIN — SODIUM CHLORIDE 1000 ML: 9 INJECTION, SOLUTION INTRAVENOUS at 12:57

## 2024-08-28 RX ADMIN — ONDANSETRON 4 MG: 2 INJECTION INTRAMUSCULAR; INTRAVENOUS at 12:57

## 2024-08-29 ENCOUNTER — TELEPHONE (OUTPATIENT)
Dept: NEUROSURGERY | Facility: CLINIC | Age: 49
End: 2024-08-29

## 2024-08-29 ENCOUNTER — OFFICE VISIT (OUTPATIENT)
Dept: NEUROSURGERY | Facility: CLINIC | Age: 49
End: 2024-08-29
Payer: MEDICAID

## 2024-08-29 VITALS
HEIGHT: 69 IN | DIASTOLIC BLOOD PRESSURE: 70 MMHG | BODY MASS INDEX: 19.7 KG/M2 | TEMPERATURE: 98 F | SYSTOLIC BLOOD PRESSURE: 100 MMHG | WEIGHT: 133 LBS

## 2024-08-29 DIAGNOSIS — M54.12 CERVICAL RADICULOPATHY AT C6: ICD-10-CM

## 2024-08-29 DIAGNOSIS — M54.12 CERVICAL RADICULOPATHY AT C5: Primary | ICD-10-CM

## 2024-08-29 PROCEDURE — 99214 OFFICE O/P EST MOD 30 MIN: CPT | Performed by: PHYSICIAN ASSISTANT

## 2024-09-23 ENCOUNTER — OFFICE VISIT (OUTPATIENT)
Dept: PAIN MEDICINE | Facility: CLINIC | Age: 49
End: 2024-09-23
Payer: MEDICARE

## 2024-09-23 VITALS — HEIGHT: 69 IN | WEIGHT: 132 LBS | BODY MASS INDEX: 19.55 KG/M2

## 2024-09-23 DIAGNOSIS — M54.12 CERVICAL RADICULOPATHY AT C7: Primary | ICD-10-CM

## 2024-09-23 DIAGNOSIS — M54.12 CERVICAL RADICULOPATHY AT C7: ICD-10-CM

## 2024-09-23 PROCEDURE — 1160F RVW MEDS BY RX/DR IN RCRD: CPT | Performed by: STUDENT IN AN ORGANIZED HEALTH CARE EDUCATION/TRAINING PROGRAM

## 2024-09-23 PROCEDURE — 1159F MED LIST DOCD IN RCRD: CPT | Performed by: STUDENT IN AN ORGANIZED HEALTH CARE EDUCATION/TRAINING PROGRAM

## 2024-09-23 PROCEDURE — 1125F AMNT PAIN NOTED PAIN PRSNT: CPT | Performed by: STUDENT IN AN ORGANIZED HEALTH CARE EDUCATION/TRAINING PROGRAM

## 2024-09-23 PROCEDURE — G2211 COMPLEX E/M VISIT ADD ON: HCPCS | Performed by: STUDENT IN AN ORGANIZED HEALTH CARE EDUCATION/TRAINING PROGRAM

## 2024-09-23 PROCEDURE — 99204 OFFICE O/P NEW MOD 45 MIN: CPT | Performed by: STUDENT IN AN ORGANIZED HEALTH CARE EDUCATION/TRAINING PROGRAM

## 2024-09-23 RX ORDER — BUPRENORPHINE HYDROCHLORIDE AND NALOXONE HYDROCHLORIDE DIHYDRATE 8; 2 MG/1; MG/1
TABLET SUBLINGUAL
COMMUNITY
Start: 2024-09-02

## 2024-09-24 ENCOUNTER — OUTSIDE FACILITY SERVICE (OUTPATIENT)
Dept: PAIN MEDICINE | Facility: CLINIC | Age: 49
End: 2024-09-24
Payer: MEDICAID

## 2024-09-24 ENCOUNTER — DOCUMENTATION (OUTPATIENT)
Dept: PAIN MEDICINE | Facility: CLINIC | Age: 49
End: 2024-09-24

## 2024-09-24 PROCEDURE — 99152 MOD SED SAME PHYS/QHP 5/>YRS: CPT | Performed by: STUDENT IN AN ORGANIZED HEALTH CARE EDUCATION/TRAINING PROGRAM

## 2024-09-24 PROCEDURE — 62321 NJX INTERLAMINAR CRV/THRC: CPT | Performed by: STUDENT IN AN ORGANIZED HEALTH CARE EDUCATION/TRAINING PROGRAM

## 2024-09-27 ENCOUNTER — PATIENT ROUNDING (BHMG ONLY) (OUTPATIENT)
Dept: PAIN MEDICINE | Facility: CLINIC | Age: 49
End: 2024-09-27
Payer: MEDICAID

## 2024-11-04 ENCOUNTER — OFFICE VISIT (OUTPATIENT)
Dept: PAIN MEDICINE | Facility: CLINIC | Age: 49
End: 2024-11-04
Payer: MEDICARE

## 2024-11-04 VITALS — BODY MASS INDEX: 19.71 KG/M2 | HEIGHT: 69 IN | WEIGHT: 133.1 LBS

## 2024-11-04 DIAGNOSIS — M54.16 LUMBAR RADICULOPATHY: ICD-10-CM

## 2024-11-04 DIAGNOSIS — M54.12 CERVICAL RADICULOPATHY AT C7: Primary | ICD-10-CM

## 2024-11-04 DIAGNOSIS — M47.812 CERVICAL SPONDYLOSIS WITHOUT MYELOPATHY: ICD-10-CM

## 2024-11-04 PROCEDURE — G2211 COMPLEX E/M VISIT ADD ON: HCPCS

## 2024-11-04 PROCEDURE — 1125F AMNT PAIN NOTED PAIN PRSNT: CPT

## 2024-11-04 PROCEDURE — 1159F MED LIST DOCD IN RCRD: CPT

## 2024-11-04 PROCEDURE — 1160F RVW MEDS BY RX/DR IN RCRD: CPT

## 2024-11-04 PROCEDURE — 99213 OFFICE O/P EST LOW 20 MIN: CPT

## 2024-11-04 NOTE — PROGRESS NOTES
Referring Physician: No referring provider defined for this encounter.    Primary Physician: Medhat Georges APRN    CHIEF COMPLAINT or REASON FOR VISIT: Follow-up (Post DARIA/Patient states injection helped for a few days.) and Neck Pain      Initial history of present illness on 09/23/2024:  Mr. Sharad Chun is 48 y.o. male who presents as a new patient referral for evaluation and treatment of chronic neck pain with radiation into right upper extremity.  Patient does have a prior history of C5-6 ACDF with Dr. Metcalf in August 2021.  Subsequent to that injection he was doing relatively well without cervical radicular pain however in approximately July 2024 he redeveloped right-sided neck pain with radiation into the right hand.  He describes numbness tingling and pain into the long and ring fingers of his right hand.  He states that it does feel like it did before his surgery.  He was evaluated again by neurosurgery, Amandeep Williamson PA-C, who referred for consideration of epidural steroid injection as well as ordered an EMG/NCV.  That study is pending.  Patient denies any clumsiness or weakness of the upper extremities, falls, unsteady gait.  He has been treated for some time for various chronic pains with Percocet 10 mg, gabapentin.  Then, in July 2024 this was rotated to methadone.  Then, according to the PDMP he was started on Suboxone in September.    Interval history:  Patient returns to clinic today after undergoing a cervical interlaminar epidural steroid injection.  Initially, patient reported minimal relief from this procedure but after further investigation he reports he is no longer having right upper extremity pain extending from his neck.  All of this pain subsided after his cervical epidural steroid injection.  Today, he primary complaints of chronic bilateral neck pain.  There is associated neck stiffness.  Pain is exacerbated with lateral rotation.    Additionally, patient complains of chronic  bilateral low back pain with radiation into the bilateral lower extremities.  Patient states pain will radiate down both of his legs all the way into the plantar aspect of his feet.  This has been going on since 2020.  Patient states he was treated at a separate pain management clinic where he received epidural steroid injections without relief.  He has been evaluated by neurosurgery for this issue in 2023 who at that time did not identify any role for current surgical intervention.  He reports he has tried physical therapy in the past without relief.  He denies any new injuries or events since his previous appointment.  Lumbar MRI from 10/24/2023 reveals mild multilevel lumbar spondylosis with multilevel bilateral neural foraminal stenosis with no significant spinal canal stenosis.  There was a left paramedian disc protrusion at L2/3.  We did discuss multiple treatment options for this today including a lumbar epidural steroid injection.  Patient would like to speak with neurosurgery before deciding on any treatment options for his lower back.  He has a follow-up appointment with Amandeep Williamson PA-C on 11/12/2024 to discuss back pain.    Interventions:  9/24/2024: Right paramedian DARIA with 100% relief of right upper extremity radicular symptoms    Objective Pain Scoring:   BRIEF PAIN INVENTORY:  Total score:   Pain Score    11/04/24 0858   PainSc:   8   PainLoc: Neck        PHQ-2:    PHQ-9:    Opioid Risk Tool:         Review of Systems:   ROS negative except as otherwise noted     Past Medical History:   Past Medical History:   Diagnosis Date    Arthritis     Cervical disc disorder 07/01/2020    Surgery cured nerve pain in arm.    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    GERD (gastroesophageal reflux disease)     Headache     Heart murmur     grew out of it     Hepatitis C      Hiatal hernia     still present     Hyperlipidemia     Joint pain 2012    Low back pain 2021    Lumbosacral disc disease 03/10/2021    MRI shows bulging disc in lower back    Rheumatoid arthritis 2010         Past Surgical History:   Past Surgical History:   Procedure Laterality Date    ANTERIOR CERVICAL DISCECTOMY W/ FUSION Right 2021    Procedure: CERVICAL DISCECTOMY ANTERIOR WITH FUSION C5-6;  Surgeon: Mikel Metcalf MD;  Location: [facility] license of [facility];  Service: Neurosurgery;  Laterality: Right;    NECK SURGERY  2021    ROTATOR CUFF REPAIR Right 2020         Family History   Family History   Problem Relation Age of Onset    Arthritis Mother     Alcohol abuse Father         I got PTSD from being the child of an alcoholic.         Social History   Social History     Socioeconomic History    Marital status: Single   Tobacco Use    Smoking status: Former     Current packs/day: 0.00     Average packs/day: 0.3 packs/day for 33.9 years (8.5 ttl pk-yrs)     Types: Cigarettes     Start date: 1990     Quit date: 2024     Years since quittin.2     Passive exposure: Past    Smokeless tobacco: Former    Tobacco comments:     I used Wellbutrin and Nicorette to quit smoking.   Vaping Use    Vaping status: Never Used   Substance and Sexual Activity    Alcohol use: Not Currently     Comment: I might have drank alcohol 50 times in my life.    Drug use: Not Currently     Frequency: 7.0 times per week     Types: Marijuana     Comment: No marijuana since 2022.    Sexual activity: Not Currently     Partners: Female     Birth control/protection: Partner of same sex     Comment: ED        Medications:     Current Outpatient Medications:     buprenorphine-naloxone (SUBOXONE) 8-2 MG per SL tablet, , Disp: , Rfl:     gabapentin (NEURONTIN) 800 MG tablet, 3 (Three) Times a Day., Disp: , Rfl:     mirtazapine (REMERON SOL-TAB) 15 MG disintegrating tablet, Take 1 tablet by mouth., Disp: , Rfl:      "ondansetron ODT (ZOFRAN-ODT) 4 MG disintegrating tablet, Place 1 tablet on the tongue Every 8 (Eight) Hours As Needed for Nausea or Vomiting., Disp: 12 tablet, Rfl: 0    ProAir  (90 Base) MCG/ACT inhaler, , Disp: , Rfl:     tiZANidine (ZANAFLEX) 4 MG tablet, Take 1 tablet by mouth Every Night., Disp: , Rfl:         Physical Exam:     Vitals:    11/04/24 0858   Weight: 60.4 kg (133 lb 1.6 oz)   Height: 175.3 cm (69.02\")   PainSc:   8   PainLoc: Neck          General: Alert and oriented, No acute distress.   HEENT: Normocephalic, atraumatic.   Cardiovascular: No gross edema  Respiratory: Respirations are non-labored    Cervical Spine:   No masses or atrophy  Range of motion - Flexion normal. Extension normal. Lateral rotation normal.   Palpation -tender bilaterally  Facet loading-tender bilaterally  Spurling's -positive right      Lumbar:  Inspection: no gross abnormality  ROM: Limited secondary to pain  Facet Loading: positive bilaterally  Paraspinal muscle palpation: Positive left  Spinous process palpation: nontender  Facet palpation: Positive left  MOTOR: LLE: 5/5 throughout RLE: 5/5 throughout  REFLEX: 2+ reflexes in BLE with negative ankle clonus bilaterally  Straight Leg Raise: Positive bilaterally  Negative Jenna's bilaterally       Motor Exam:    Strength: Rate on 1-5 scale Right Left    C5-Elbow flexion, Deltoid 5/5  5/5    C6-Wrist extension 5/5  5/5    C7- Elbow / finger extension 5/5  5/5    C8- Finger flexion 5/5  5/5    T1- Intrinsics hand 5/5  5/5       Sensory Exam: Altered sensation right C7 dermatome.       Neurologic: Cranial Nerves II-XII are grossly intact.   Jeff’s -negative bilaterally      Psychiatric: Cooperative.   Gait: Normal   Assistive Devices: None    Imaging Studies:   Results for orders placed during the hospital encounter of 07/22/21    MRI Lumbar Spine Without Contrast    Narrative  EXAMINATION: MRI LUMBAR SPINE WO CONTRAST-    INDICATION: Radiculopathy; " M54.9-Dorsalgia, unspecified.    TECHNIQUE: Routine multiplanar imaging was obtained of the lumbar spine  without the administration of gadolinium contrast.    COMPARISON: None.    FINDINGS: The vertebral body height is preserved. There is mild disc  desiccation seen at the L2-L3 level. The facets are well aligned. Normal  signal intensity within the conus. Spinal cord terminates at the T12-L1  disc space. Facets are well aligned. Pedicles are intact. No abnormal  mass or fluid collection is seen within the paraspinal muscles.    Axial imaging reveals at the L1-L2 level no significant central spinal  canal stenosis or nerve root contact or compromise.    At the L2-L3 level, there is a broad-based disc bulge with some  lateralization to the left creating narrowing of the left neuroforamina.  No definite nerve root contact or compromise.    At the L3-L4 level, there is a broad-based disc bulge with some  lateralization to the left creating narrowing of the left neuroforamina.  No nerve root contact or compromise.    At the L4-L5 disc space, there is no significant central spinal canal  stenosis or nerve root contact or compromise. No neural foraminal  narrowing.    At the L5-S1 level, there is no significant central spinal canal  stenosis or nerve root contact or compromise.    Impression  There is small posterior disc osteophyte complexes creating  no significant central spinal canal stenosis or nerve root contact or  compromise. No neuroforaminal narrowing.    D:  07/22/2021  E:  07/22/2021    This report was finalized on 7/23/2021 5:57 PM by Dr. Mya Tena MD.        Independent review of radiographic imaging: Available for my interpretation is a cervical MRI dated August 19, 2024 demonstrating presence of C5/C6 anterior fusion hardware; bilateral C6/C7 neuroforaminal stenosis.    Impression & Plan:       09/23/2024: Sharad Chun is a 48 y.o. male with past medical history significant for C5-6 ACDF with   [place] 2021, right rotator cuff repair, COPD, GERD, opioid use disorder (Suboxone) who presents to the pain clinic for evaluation and treatment of chronic neck pain with radiation right upper extremity and hand.  11/4/2024: Good relief of right upper extremity pain after DARIA.  Will plan for bilateral CMBB for continued axial neck pain.  Briefly discussed LESI for low back pain with radiation to the bilateral lower extremities.  Patient politely declined.    1. Cervical radiculopathy at C7    2. Cervical spondylosis without myelopathy    3. Lumbar radiculopathy          PLAN:  1. Medication Recommendations: Defer to addiction management, patient recently transitioned from Percocet to methadone to now Suboxone.  Agree with gabapentin.    2. Physical Therapy: Continue HEP    3. Psychological: defer    4. Complementary and alternative (CAM) Therapies:     5. Labs/Diagnostic studies: EMG pending    6. Imaging: Lumbar spine MRI report reviewed    7. Interventions: Schedule bilateral C6/7 and C7/T1 cervical medial branch blocks (38903, [id number]).  Will proceed with branch blocks below the level of anterior fusion. If the first block provides diagnostic relief we will schedule a second set of medial branch blocks.  If second set of medial branch blocks provides diagnostic relief we will schedule rhizotomy.   -Risks of this procedure include bleeding, infection, damage to surrounding structures which may exacerbate symptoms.  Can consider repeat right paramedian cervical interlaminar epidural steroid injection (04987).    8. Referrals: None indicated     9. Records: Neurosurgery notes reviewed    10. Lifestyle goals:    Follow-up 4-5 months      [facility]  Chino Poe PA-C          Quality Metrics:

## 2024-11-05 ENCOUNTER — HOSPITAL ENCOUNTER (OUTPATIENT)
Facility: HOSPITAL | Age: 49
Discharge: HOME | End: 2024-11-05
Payer: MEDICARE

## 2024-11-05 DIAGNOSIS — M54.12 CERVICAL RADICULOPATHY AT C7: Primary | ICD-10-CM

## 2024-11-05 LAB
ALBUMIN SERPL-MCNC: 4.6 G/DL (ref 3.4–4.8)
ALBUMIN/GLOB SERPL: 1.5 {RATIO} (ref 0.8–2)
ALP SERPL-CCNC: 82 U/L (ref 25–100)
ALT SERPL-CCNC: 10 U/L (ref 4–36)
ANION GAP SERPL CALCULATED.3IONS-SCNC: 12 MMOL/L (ref 3–16)
AST SERPL-CCNC: 15 U/L (ref 8–33)
BASOPHILS # BLD: 0 K/UL (ref 0–0.1)
BASOPHILS NFR BLD: 0.5 %
BILIRUB SERPL-MCNC: 0.7 MG/DL (ref 0.3–1.2)
BUN SERPL-MCNC: 18 MG/DL (ref 6–20)
CALCIUM SERPL-MCNC: 9.9 MG/DL (ref 8.5–10.5)
CHLORIDE SERPL-SCNC: 99 MMOL/L (ref 98–107)
CO2 SERPL-SCNC: 29 MMOL/L (ref 20–30)
CREAT SERPL-MCNC: 0.9 MG/DL (ref 0.4–1.2)
EOSINOPHIL # BLD: 0.1 K/UL (ref 0–0.4)
EOSINOPHIL NFR BLD: 1.5 %
ERYTHROCYTE [DISTWIDTH] IN BLOOD BY AUTOMATED COUNT: 11.4 % (ref 11–16)
GFR SERPLBLD CREATININE-BSD FMLA CKD-EPI: >90 ML/MIN/{1.73_M2}
GLOBULIN SER CALC-MCNC: 3.1 G/DL
GLUCOSE SERPL-MCNC: 96 MG/DL (ref 74–106)
HCT VFR BLD AUTO: 41.7 % (ref 40–54)
HGB BLD-MCNC: 14.3 G/DL (ref 13–18)
IMM GRANULOCYTES # BLD: 0 K/UL
IMM GRANULOCYTES NFR BLD: 0.4 % (ref 0–5)
INR PPP: 0.96 (ref 0.9–1.1)
LYMPHOCYTES # BLD: 2.2 K/UL (ref 1.5–4)
LYMPHOCYTES NFR BLD: 29.5 %
MCH RBC QN AUTO: 29.5 PG (ref 27–32)
MCHC RBC AUTO-ENTMCNC: 34.3 G/DL (ref 31–35)
MCV RBC AUTO: 86.2 FL (ref 80–100)
MONOCYTES # BLD: 0.5 K/UL (ref 0.2–0.8)
MONOCYTES NFR BLD: 6.3 %
NEUTROPHILS # BLD: 4.7 K/UL (ref 2–7.5)
NEUTS SEG NFR BLD: 61.8 %
PLATELET # BLD AUTO: 264 K/UL (ref 150–400)
PMV BLD AUTO: 9.3 FL (ref 6–10)
POTASSIUM SERPL-SCNC: 3.8 MMOL/L (ref 3.4–5.1)
PROT SERPL-MCNC: 7.7 G/DL (ref 6.4–8.3)
PROTHROMBIN TIME: 12.7 SEC (ref 12.1–14)
RBC # BLD AUTO: 4.84 M/UL (ref 4.5–6)
SODIUM SERPL-SCNC: 140 MMOL/L (ref 136–145)
WBC # BLD AUTO: 7.5 K/UL (ref 4–11)

## 2024-11-05 PROCEDURE — 87522 HEPATITIS C REVRS TRNSCRPJ: CPT

## 2024-11-05 PROCEDURE — 80053 COMPREHEN METABOLIC PANEL: CPT

## 2024-11-05 PROCEDURE — 85025 COMPLETE CBC W/AUTO DIFF WBC: CPT

## 2024-11-05 PROCEDURE — 85610 PROTHROMBIN TIME: CPT

## 2024-11-05 PROCEDURE — 36415 COLL VENOUS BLD VENIPUNCTURE: CPT

## 2024-11-07 ENCOUNTER — TELEPHONE (OUTPATIENT)
Dept: PAIN MEDICINE | Facility: CLINIC | Age: 49
End: 2024-11-07
Payer: MEDICARE

## 2024-11-07 NOTE — TELEPHONE ENCOUNTER
"Surgery/Procedure:  bilateral C6/7 and C7/T1 cervical medial branch blocks   Surgery/Procedure Date:  11/14/2024  Last visit:   Office Visit with Chino Poe PA-C (11/04/2024)   Next visit: 03/17/2025     Reason for call:      Copied from HUB:    \"Please call / leave vmail patient cell [phone] to advise patient if Dr. Proctor will take over prescribing patient's gabapentin 800 mg (one tablet three times per day)?      Dr. Matthias Mendez at [facility] has been seeing patient & has been prescribing gabapentin but Dr. Mendez stated he would no longer prescribe patient's gabapentin since patient is under the care of Dr. Proctor w/ pain mgmt      Patient ran out of gabapentin 800 mg yesterday 11-06-24      Uses [facility] (ph: [phone])\"      Okay to take over patient's Gabapentin?   No control substance agreement on file.       "

## 2024-11-07 NOTE — TELEPHONE ENCOUNTER
PLEASE CALL / LEAVE VMAIL PATIENT CELL [phone] TO ADVISE PATIENT IF DR GARIBAY WILL TAKE OVER PRESCRIBING PATIENT's GABAPENTIN 800 MG (ONE TABLET THREE TIMES PER DAY)?     DR MARINE AJ AT [facility] HAS BEEN SEEING PATIENT & HAS BEEN PRESCRIBING GABAPENTIN BUT DR AJ STATED HE WOULD NO LONGER PRESCRIBE PATIENT's GABAPENTIN SINCE PATIENT IS UNDER THE CARE OF DR GARIBAY w/ PAIN MGMT     PATIENT RAN OUT OF GABAPENTIN 800 MG YESTERDAY 11-06-24     USES [facility] (PH: [phone])     THANKS

## 2024-11-08 ENCOUNTER — TELEPHONE (OUTPATIENT)
Dept: PAIN MEDICINE | Facility: CLINIC | Age: 49
End: 2024-11-08
Payer: MEDICARE

## 2024-11-08 NOTE — TELEPHONE ENCOUNTER
"        Hub staff attempted to follow warm transfer process and was unsuccessful     Caller: Sharad Chun \"Mil\"    Relationship to patient: Self    Best call back number: [phone] (home)       Patient is needing: PATIENT CALLED IN RESPONSE TO YESTERDAY'S TE INTERACTON WITH KASIE JANG ATTEMPETED TO WT BUT WAS UNSUCCESSFUL. PATIENT HAS BEEN SCHEDULED PER [facility] POLICY 72 HOURS OUT.     THAT APPOINTMENT WAS SET FOR 11/13/2024 AT 8.45AM  .          "

## 2024-11-11 ENCOUNTER — TELEPHONE (OUTPATIENT)
Dept: PAIN MEDICINE | Facility: CLINIC | Age: 49
End: 2024-11-11
Payer: MEDICARE

## 2024-11-11 DIAGNOSIS — M54.12 CERVICAL RADICULOPATHY AT C5: Primary | ICD-10-CM

## 2024-11-11 DIAGNOSIS — M54.12 CERVICAL RADICULOPATHY AT C6: ICD-10-CM

## 2024-11-11 NOTE — TELEPHONE ENCOUNTER
"Hub staff attempted to follow warm transfer process and was unsuccessful     Caller: Sharad Chun \"Mil\"    Relationship to patient: Self    Best call back number: [phone]    Patient is needing: PT CALLING ABOUT [employer] [facility] MESSAGE SENT LAST WEEK ABOUT MOVING APPT TO 11/12/24 AT 11:30AM. PT ORIGINALLY SCHEDULED FOR 11/13/24. PLEASE ADVISE IF THIS DAY/TIME IS STILL OKAY FOR PT.  "

## 2024-11-12 ENCOUNTER — HOSPITAL ENCOUNTER (OUTPATIENT)
Dept: NEUROLOGY | Facility: HOSPITAL | Age: 49
Discharge: HOME | End: 2024-11-12
Payer: MEDICARE

## 2024-11-12 ENCOUNTER — OFFICE VISIT (OUTPATIENT)
Dept: PAIN MEDICINE | Facility: CLINIC | Age: 49
End: 2024-11-12
Payer: MEDICARE

## 2024-11-12 ENCOUNTER — OFFICE VISIT (OUTPATIENT)
Dept: NEUROSURGERY | Facility: CLINIC | Age: 49
End: 2024-11-12
Payer: MEDICARE

## 2024-11-12 ENCOUNTER — LAB (OUTPATIENT)
Dept: LAB | Facility: HOSPITAL | Age: 49
End: 2024-11-12
Payer: MEDICARE

## 2024-11-12 VITALS — BODY MASS INDEX: 19.7 KG/M2 | HEIGHT: 69 IN | WEIGHT: 133 LBS

## 2024-11-12 VITALS
BODY MASS INDEX: 19.7 KG/M2 | SYSTOLIC BLOOD PRESSURE: 120 MMHG | TEMPERATURE: 97 F | WEIGHT: 133 LBS | DIASTOLIC BLOOD PRESSURE: 84 MMHG | HEIGHT: 69 IN

## 2024-11-12 DIAGNOSIS — M54.12 CERVICAL RADICULOPATHY AT C5: ICD-10-CM

## 2024-11-12 DIAGNOSIS — M54.12 CERVICAL RADICULOPATHY AT C6: ICD-10-CM

## 2024-11-12 DIAGNOSIS — G89.29 CHRONIC BILATERAL LOW BACK PAIN WITHOUT SCIATICA: ICD-10-CM

## 2024-11-12 DIAGNOSIS — Z51.81 ENCOUNTER FOR THERAPEUTIC DRUG LEVEL MONITORING: ICD-10-CM

## 2024-11-12 DIAGNOSIS — M54.50 CHRONIC BILATERAL LOW BACK PAIN WITHOUT SCIATICA: ICD-10-CM

## 2024-11-12 DIAGNOSIS — Z51.81 ENCOUNTER FOR THERAPEUTIC DRUG LEVEL MONITORING: Primary | ICD-10-CM

## 2024-11-12 DIAGNOSIS — M54.12 CERVICAL RADICULOPATHY AT C7: ICD-10-CM

## 2024-11-12 DIAGNOSIS — G89.29 CHRONIC NECK PAIN: Primary | ICD-10-CM

## 2024-11-12 DIAGNOSIS — M47.812 CERVICAL SPONDYLOSIS WITHOUT MYELOPATHY: Primary | ICD-10-CM

## 2024-11-12 DIAGNOSIS — M54.2 CHRONIC NECK PAIN: Primary | ICD-10-CM

## 2024-11-12 LAB
AMPHET+METHAMPHET UR QL: NEGATIVE
AMPHETAMINES UR QL: NEGATIVE
BARBITURATES UR QL SCN: NEGATIVE
BENZODIAZ UR QL SCN: NEGATIVE
BUPRENORPHINE SERPL-MCNC: POSITIVE NG/ML
CANNABINOIDS SERPL QL: NEGATIVE
COCAINE UR QL: NEGATIVE
FENTANYL UR-MCNC: NEGATIVE NG/ML
METHADONE UR QL SCN: NEGATIVE
OPIATES UR QL: NEGATIVE
OXYCODONE UR QL SCN: POSITIVE
PCP UR QL SCN: NEGATIVE
TRICYCLICS UR QL SCN: NEGATIVE

## 2024-11-12 PROCEDURE — 99213 OFFICE O/P EST LOW 20 MIN: CPT | Performed by: PHYSICIAN ASSISTANT

## 2024-11-12 PROCEDURE — 95910 NRV CNDJ TEST 7-8 STUDIES: CPT

## 2024-11-12 PROCEDURE — 95886 MUSC TEST DONE W/N TEST COMP: CPT

## 2024-11-12 PROCEDURE — 80307 DRUG TEST PRSMV CHEM ANLYZR: CPT

## 2024-11-12 NOTE — PROGRESS NOTES
Patient: Sharad Chun  : 1975    Primary Care Provider: Medhat Georges APRN      Chief Complaint: Right arm pain    History of Present Illness:        Patient is a very nice 48-year-old gentleman known to the neurosurgical practice for C5-6 anterior cervical discectomy and fusion in 2021.  Patient doing really well with that procedure unfortunately about 3 months ago he has started having neck and right upper extremity pain.  This is very similar to the pain he was having prior to his ACDF pain radiates from the base of the neck through his suprascapular area around his deltoid and down to his right elbow.     MRI of the cervical spine was reviewed showing a left lateralizing disc bulge at C 6-7.  Area of the prior ACDF looks well decompressed.    Patient then went to get injections with Dr. Proctor and notes that his right upper extremity pain has gotten quite a bit better but his neck pain still persists    Patient was sent out for an EMG to ensure there is no peripheral nerve entrapment that could be attributing to his symptoms but this came back normal.    Patient has upcoming injection on Thursday       Review of Systems   Constitutional:  Negative for activity change, appetite change, chills, diaphoresis, fatigue, fever and unexpected weight change.   HENT:  Negative for congestion, dental problem, drooling, ear discharge, ear pain, facial swelling, hearing loss, mouth sores, nosebleeds, postnasal drip, rhinorrhea, sinus pressure, sinus pain, sneezing, sore throat, tinnitus, trouble swallowing and voice change.    Eyes:  Negative for photophobia, pain, discharge, redness, itching and visual disturbance.   Respiratory:  Negative for apnea, cough, choking, chest tightness, shortness of breath, wheezing and stridor.    Cardiovascular:  Negative for chest pain, palpitations and leg swelling.   Gastrointestinal:  Negative for abdominal distention, abdominal pain, anal bleeding, blood in  stool, constipation, diarrhea, nausea, rectal pain and vomiting.   Endocrine: Negative for cold intolerance, heat intolerance, polydipsia, polyphagia and polyuria.   Genitourinary:  Negative for decreased urine volume, difficulty urinating, dysuria, enuresis, flank pain, frequency, genital sores, hematuria, penile discharge, penile pain, penile swelling, scrotal swelling, testicular pain and urgency.   Musculoskeletal:  Positive for neck pain. Negative for arthralgias, back pain, gait problem, joint swelling, myalgias and neck stiffness.   Skin:  Negative for color change, pallor, rash and wound.   Allergic/Immunologic: Negative for environmental allergies, food allergies and immunocompromised state.   Neurological:  Negative for dizziness, tremors, seizures, syncope, facial asymmetry, speech difficulty, weakness, light-headedness, numbness and headaches.   Hematological:  Negative for adenopathy. Does not bruise/bleed easily.   Psychiatric/Behavioral:  Negative for agitation, behavioral problems, confusion, decreased concentration, dysphoric mood, hallucinations, self-injury, sleep disturbance and suicidal ideas. The patient is not nervous/anxious and is not hyperactive.        Past Medical History:     Past Medical History:   Diagnosis Date    Arthritis     Cervical disc disorder 07/01/2020    Surgery cured nerve pain in arm.    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    GERD (gastroesophageal reflux disease)     Headache     Heart murmur     grew out of it     Hepatitis C     Hiatal hernia     still present     Hyperlipidemia     Joint pain 2012    Low back pain 03/04/2021    Lumbosacral disc disease 03/10/2021    MRI shows bulging disc in lower back    Rheumatoid arthritis 2010       Family History:     Family History   Problem Relation Age of Onset    Arthritis Mother      "Alcohol abuse Father         I got PTSD from being the child of an alcoholic.       Social History:    reports that he quit smoking about 3 months ago. His smoking use included cigarettes. He started smoking about 34 years ago. He has a 8.5 pack-year smoking history. He has been exposed to tobacco smoke. He has quit using smokeless tobacco. He reports that he does not currently use alcohol. He reports that he does not currently use drugs after having used the following drugs: Marijuana. Frequency: 7.00 times per week.   SMOKING STATUS: Non-smoker    Surgical History:     Past Surgical History:   Procedure Laterality Date    ANTERIOR CERVICAL DISCECTOMY W/ FUSION Right 08/04/2021    Procedure: CERVICAL DISCECTOMY ANTERIOR WITH FUSION C5-6;  Surgeon: Mikel Metcalf MD;  [facility];  Service: Neurosurgery;  Laterality: Right;    NECK SURGERY  08/04/2021    ROTATOR CUFF REPAIR Right 02/28/2020       Allergies:   Patient has no known allergies.    Physical Exam:    Vital Signs:/84 (BP Location: Right arm, Patient Position: Sitting, Cuff Size: Adult)   Temp 97 °F (36.1 °C) (Infrared)   Ht 175.3 cm (69\")   Wt 60.3 kg (133 lb)   BMI 19.64 kg/m²    BMI: Body mass index is 19.64 kg/m².     GENERAL:   The patient is in no acute distress, and is able to answer all questions appropriately.  Skin:  The incision is well-healed and well approximated.  No signs of infection, bleeding, or erythema.  Musculoskeletal:     strength is 5 out of 5 bilaterally.   Shoulder abduction is 5 out of 5.    Dorsiflexion is 5/5 Bilaterally  Plantarflexion is 5/5 bilaterally  Hip Flexion 5/5 bilaterally.    The patient's gait is normal without antalgia.  Neurologic:   The patient is alert and oriented by 3.     Pupils are equal and reactive to light.    Visual fields are full.    Extraocular movements are intact without nystagmus.    There is no evidence of central motor drift. No facial droop.  No difficulty with rapid " alternating movements.    Sensation is equal bilaterally with no deficit.      Reflexes:  2+ @ biceps, triceps, brachioradialis, as well as the patellar and Achilles tendon bilaterally.  No sign of Jeff's, clonus, or long track signs      Medical Decision Making    Data Review:   MRI of the cervical spine as discussed in HPI.  Patient has a left-sided disc bulge at C6-7.    EMG nerve conduction study reviewed and is normal    Diagnosis:   Chronic neck pain  Chronic low back pain    Treatment Options:   Patient's symptoms do not correlate with his MRI.  This does not mean that he does not still have persistent upper extremity pain.  I think that the way to treat this would be with ongoing injections.  If he started develop C7 radiculopathy on the left we could always consider a revision ACDF but with his left arm remaining asymptomatic I would not recommend that.     Patient understands our recommendations will call back with changes in his status    BMI is within normal parameters. No other follow-up for BMI required.     Diagnosis Plan   1. Chronic neck pain        2. Chronic bilateral low back pain without sciatica

## 2024-11-12 NOTE — PROGRESS NOTES
Referring Physician: No referring provider defined for this encounter.    Primary Physician: Medhat Georges APRN    CHIEF COMPLAINT or REASON FOR VISIT: Follow-up, Neck Pain, and Med Management      Initial history of present illness on 09/23/2024:  Mr. Sharad Chun is 48 y.o. male who presents as a new patient referral for evaluation and treatment of chronic neck pain with radiation into right upper extremity.  Patient does have a prior history of C5-6 ACDF with Dr. Metcalf in August 2021.  Subsequent to that injection he was doing relatively well without cervical radicular pain however in approximately July 2024 he redeveloped right-sided neck pain with radiation into the right hand.  He describes numbness tingling and pain into the long and ring fingers of his right hand.  He states that it does feel like it did before his surgery.  He was evaluated again by neurosurgery, Amandeep Williamson PA-C, who referred for consideration of epidural steroid injection as well as ordered an EMG/NCV.  That study is pending.  Patient denies any clumsiness or weakness of the upper extremities, falls, unsteady gait.  He has been treated for some time for various chronic pains with Percocet 10 mg, gabapentin.  Then, in July 2024 this was rotated to methadone.  Then, according to the PDMP he was started on Suboxone in September.    Interval history:  Patient returns to clinic today.  He has previously undergone a cervical epidural steroid injection with excellent relief of his right upper extremity radicular symptoms.  He continues to complain of chronic neck pain.  He continues complain of chronic bilateral neck pain.  He is set for cervical medial branch blocks on Thursday, 11/14/2024.  Additionally, he has been taking gabapentin 300 mg 3 times daily as prescribed by another provider.  He is here today for consideration of pain management taking over this prescription.  Per patient report he has been out of this medication  since 11/6/2024.  Patient did undergo a urine drug screen on 8/28/2024 that revealed positive for methadone; consistent with prescription.  Additionally, this UDS revealed positive fentanyl.  He is no longer prescribed methadone.  He reports he believes the UDS revealed positive fentanyl because medication that he was buying off the street was laced with it.  He has been following with a Suboxone clinic where he receives weekly UDS's.  We do not have access to these urine drug screens.  Additionally, he reports he has been following with a peer consult team.  He reports he is not currently using any illicit drugs or substances.  His last reported use was a couple of months ago.  He states he has not been using any illicit drugs or substance since he started following with his Suboxone clinic.  I did inform patient that gabapentin is a controlled substance in the [facility], there are laws that regulate this medication.  He voiced understanding  He was evaluated by neurosurgery, Amandeep Williamson PA-C, who at the moment due to does not identify any role for current neurosurgical intervention.  He has undergone EMG/NCV of BUE which was essentially normal.         Interventions:  9/24/2024: Right paramedian DARIA with 100% relief of right upper extremity radicular symptoms    Objective Pain Scoring:   BRIEF PAIN INVENTORY:  Total score:   Pain Score    11/12/24 1140   PainSc:   8   PainLoc: Neck        PHQ-2:    PHQ-9:    Opioid Risk Tool:         Review of Systems:   ROS negative except as otherwise noted     Past Medical History:   Past Medical History:   Diagnosis Date    Arthritis     Cervical disc disorder 07/01/2020    Surgery cured nerve pain in arm.    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    GERD (gastroesophageal reflux disease)     Headache     Heart murmur     grew  out of it     Hepatitis C     Hiatal hernia     still present     Hyperlipidemia     Joint pain 2012    Low back pain 2021    Lumbosacral disc disease 03/10/2021    MRI shows bulging disc in lower back    Rheumatoid arthritis          Past Surgical History:   Past Surgical History:   Procedure Laterality Date    ANTERIOR CERVICAL DISCECTOMY W/ FUSION Right 2021    Procedure: CERVICAL DISCECTOMY ANTERIOR WITH FUSION C5-6;  Surgeon: Mikel Metcalf MD;  Location: [facility];  Service: Neurosurgery;  Laterality: Right;    NECK SURGERY  2021    ROTATOR CUFF REPAIR Right 2020         Family History   Family History   Problem Relation Age of Onset    Arthritis Mother     Alcohol abuse Father         I got PTSD from being the child of an alcoholic.         Social History   Social History     Socioeconomic History    Marital status: Single   Tobacco Use    Smoking status: Former     Current packs/day: 0.00     Average packs/day: 0.3 packs/day for 33.9 years (8.5 ttl pk-yrs)     Types: Cigarettes     Start date: 1990     Quit date: 2024     Years since quittin.2     Passive exposure: Past    Smokeless tobacco: Former    Tobacco comments:     I used Wellbutrin and Nicorette to quit smoking.   Vaping Use    Vaping status: Never Used   Substance and Sexual Activity    Alcohol use: Not Currently     Comment: I might have drank alcohol 50 times in my life.    Drug use: Not Currently     Frequency: 7.0 times per week     Types: Marijuana     Comment: No marijuana since 2022.    Sexual activity: Not Currently     Partners: Female     Birth control/protection: Partner of same sex     Comment: ED        Medications:     Current Outpatient Medications:     buprenorphine-naloxone (SUBOXONE) 8-2 MG per SL tablet, , Disp: , Rfl:     gabapentin (NEURONTIN) 800 MG tablet, 3 (Three) Times a Day., Disp: , Rfl:     mirtazapine (REMERON SOL-TAB) 15 MG disintegrating tablet, Take 1 tablet by  "mouth., Disp: , Rfl:     ondansetron ODT (ZOFRAN-ODT) 4 MG disintegrating tablet, Place 1 tablet on the tongue Every 8 (Eight) Hours As Needed for Nausea or Vomiting., Disp: 12 tablet, Rfl: 0    tiZANidine (ZANAFLEX) 4 MG tablet, Take 1 tablet by mouth Every Night., Disp: , Rfl:     ProAir  (90 Base) MCG/ACT inhaler, , Disp: , Rfl:         Physical Exam:     Vitals:    11/12/24 1140   Weight: 60.3 kg (133 lb)   Height: 175.3 cm (69.02\")   PainSc:   8   PainLoc: Neck          General: Alert and oriented, No acute distress.   HEENT: Normocephalic, atraumatic.   Cardiovascular: No gross edema  Respiratory: Respirations are non-labored    Cervical Spine:   No masses or atrophy  Range of motion - Flexion normal. Extension normal. Lateral rotation normal.   Palpation -tender bilaterally  Facet loading-tender bilaterally  Spurling's -positive right      Lumbar:  Inspection: no gross abnormality  ROM: Limited secondary to pain  Facet Loading: positive bilaterally  Paraspinal muscle palpation: Positive left  Spinous process palpation: nontender  Facet palpation: Positive left  MOTOR: LLE: 5/5 throughout RLE: 5/5 throughout  REFLEX: 2+ reflexes in BLE with negative ankle clonus bilaterally  Straight Leg Raise: Positive bilaterally  Negative Jenna's bilaterally       Motor Exam:    Strength: Rate on 1-5 scale Right Left    C5-Elbow flexion, Deltoid 5/5  5/5    C6-Wrist extension 5/5  5/5    C7- Elbow / finger extension 5/5  5/5    C8- Finger flexion 5/5  5/5    T1- Intrinsics hand 5/5  5/5       Sensory Exam: Altered sensation right C7 dermatome.       Neurologic: Cranial Nerves II-XII are grossly intact.   Jeff’s -negative bilaterally      Psychiatric: Cooperative.   Gait: Normal   Assistive Devices: None    Imaging Studies:   Results for orders placed during the hospital encounter of 07/22/21    MRI Lumbar Spine Without Contrast    Narrative  EXAMINATION: MRI LUMBAR SPINE WO CONTRAST-    INDICATION: " Radiculopathy; M54.9-Dorsalgia, unspecified.    TECHNIQUE: Routine multiplanar imaging was obtained of the lumbar spine  without the administration of gadolinium contrast.    COMPARISON: None.    FINDINGS: The vertebral body height is preserved. There is mild disc  desiccation seen at the L2-L3 level. The facets are well aligned. Normal  signal intensity within the conus. Spinal cord terminates at the T12-L1  disc space. Facets are well aligned. Pedicles are intact. No abnormal  mass or fluid collection is seen within the paraspinal muscles.    Axial imaging reveals at the L1-L2 level no significant central spinal  canal stenosis or nerve root contact or compromise.    At the L2-L3 level, there is a broad-based disc bulge with some  lateralization to the left creating narrowing of the left neuroforamina.  No definite nerve root contact or compromise.    At the L3-L4 level, there is a broad-based disc bulge with some  lateralization to the left creating narrowing of the left neuroforamina.  No nerve root contact or compromise.    At the L4-L5 disc space, there is no significant central spinal canal  stenosis or nerve root contact or compromise. No neural foraminal  narrowing.    At the L5-S1 level, there is no significant central spinal canal  stenosis or nerve root contact or compromise.    Impression  There is small posterior disc osteophyte complexes creating  no significant central spinal canal stenosis or nerve root contact or  compromise. No neuroforaminal narrowing.    D:  07/22/2021  E:  07/22/2021    This report was finalized on 7/23/2021 5:57 PM by Dr. Mya Tena MD.        Independent review of radiographic imaging: Available for my interpretation is a cervical MRI dated August 19, 2024 demonstrating presence of C5/C6 anterior fusion hardware; bilateral C6/C7 neuroforaminal stenosis.    Impression & Plan:       09/23/2024: Sharad Chun is a 48 y.o. male with past medical history significant for  C5-6 ACDF with Dr. Metcalf 2021, right rotator cuff repair, COPD, GERD, opioid use disorder (Suboxone) who presents to the pain clinic for evaluation and treatment of chronic neck pain with radiation right upper extremity and hand.  11/4/2024: Good relief of right upper extremity pain after DARIA.  Will plan for bilateral CMBB for continued axial neck pain.  Briefly discussed LESI for low back pain with radiation to the bilateral lower extremities.  Patient politely declined.  11/12/2024: Proceed with cervical medial branch blocks as originally planned.  Will obtain new compliance UDS.  If UDS is compliant will continue to refill gabapentin.  If UDS is not compliant we will proceed with a gabapentin taper.  Patient voiced understanding    1. Cervical spondylosis without myelopathy    2. Cervical radiculopathy at C7            PLAN:  1. Medication Recommendations: Defer to addiction management, patient recently transitioned from Percocet to methadone to now Suboxone.  I did inform patient that gabapentin is a controlled substance.  This prohibits him from using any illicit drugs or substances while receiving this therapy.  He voiced understanding  -Can start gabapentin 800 mg 3 times daily, 90 pills, #0 refills if UDS is compliant.  In the event that his urine drug screen is noncompliant we will proceed with gabapentin taper.  Patient voiced understanding.  As part of this patient's treatment plan, patient will be prescribed controlled substances.  The patient has been made aware of appropriate use of such medications, including potential risk of somnolent, limited ability to drive and/or work safely, and potential for dependence or overdose.  He has been made clear that his medications refused by this patient only, without concomitant use of alcohol or other substances as prescribed.  Controlled substance status of medication discussed with patient, discussed risk of medication including abuse potential and diversion  potential and need to follow-up for reevaluation appointment in order to receive further refills.  Tony was reviewed    2. Physical Therapy: Continue HEP    3. Psychological: defer    4. Complementary and alternative (CAM) Therapies:     5. Labs/Diagnostic studies: EMG reviewed; obtain compliance UDS.  Informed patient that he will need to get his compliance urine drug screen directly following today's appointment.  He voiced understanding  Addendum 2024: Patient did have his complaints urine drug screen performed yesterday.  This did reveal positive oxycodone which is not consistent with his Tony/PDMP.  Since gabapentin is a controlled substance in the [facility] we are unable to assume responsibility of this medication due to noncompliant urine drug screen.  We recommend that he follow-up with his primary care regarding future refills/ongoing management of this medication.     6. Imagin. Interventions: Will plan for bilateral C6/7 and C7/T1 cervical medial branch blocks (70306, [id number]) as scheduled.  Will proceed with branch blocks below the level of anterior fusion. If the first block provides diagnostic relief we will schedule a second set of medial branch blocks.  If second set of medial branch blocks provides diagnostic relief we will schedule rhizotomy.   -Risks of this procedure include bleeding, infection, damage to surrounding structures which may exacerbate symptoms.  Can consider repeat right paramedian cervical interlaminar epidural steroid injection (45334).    8. Referrals: None indicated     9. Records: personally discussed this patient with Amandeep Williamson PA-C    10. Lifestyle goals:    Follow-up 1 month      [facility]  Chino Poe PA-C          Quality Metrics:

## 2024-11-13 ENCOUNTER — TELEPHONE (OUTPATIENT)
Dept: PAIN MEDICINE | Facility: CLINIC | Age: 49
End: 2024-11-13

## 2024-11-13 NOTE — TELEPHONE ENCOUNTER
"Caller: Sharad Chun \"Mil\"    Relationship: Self    Best call back number:     Requested Prescriptions:   gabapentin (NEURONTIN) 800 MG tablet     [facility] [address] - [phone]  - [phone]      Last office visit with prescribing clinician: 11/12/2024   Last telemedicine visit with prescribing clinician: Visit date not found   Next office visit with prescribing clinician: 12/10/2024     Additional details provided by patient: ALL OUT OF MEDICATION     Does the patient have less than a 3 day supply:  [x] Yes  [] No    Would you like a call back once the refill request has been completed: [] Yes [] No    If the office needs to give you a call back, can they leave a voicemail: [] Yes [] No    Rosales Strange Rep   11/13/24 08:44 EST           "

## 2024-11-14 ENCOUNTER — DOCUMENTATION (OUTPATIENT)
Dept: PAIN MEDICINE | Facility: CLINIC | Age: 49
End: 2024-11-14

## 2024-11-14 ENCOUNTER — OUTSIDE FACILITY SERVICE (OUTPATIENT)
Dept: PAIN MEDICINE | Facility: CLINIC | Age: 49
End: 2024-11-14
Payer: MEDICARE

## 2024-11-14 NOTE — PROGRESS NOTES
[facility]  [address] [place] [address]    PROCEDURE: Fluoroscopically-guided bilateral C6,7,8 Cervical Medial Branch Nerve Blocks targeting the bilateral C6/7 and C7/T1 facet joints     PRE-OP DIAGNOSIS: Cervical spondylosis  POST-OP DIAGNOSIS: Same    CONSENT: Risks, benefits and options were explained to the patient, all questions were answered and written informed consent was obtained.    ANESTHESIA: Moderate sedation was required to maintain comfort, safety, and cooperation during the procedure.  The duration of sedation service was over 10 minutes.  Patient received 2mg IV Versed and 0mcg IV fentanyl.  Independent observation and monitoring was performed by Millicent Washington.  The patient's level of consciousness and physiologic status was continually monitored with pulse oximetry, EKG from 0817 to 0829.  There were no complications or adverse events during sedation.  After the sedation patient was taken to the recovery area.      PROCEDURE NOTE:  A pre-procedural time out was performed to confirm the correct patient, procedure, side, and site. The patient was placed in prone position. A sterile field was prepped in standard fashion using Chlorhexidine and draped with sterile towels. The selected medial branch nerves were identified using AP and Lateral fluoroscopic view. The overlying skin and subcutaneous tissue was anesthetized with 1% lidocaine. A 25 gauge 3.5 inch spinal needle was advanced using intermittent fluoroscopy toward the center of the articular pillar at the C6,C7,T1 levels. Needle placement was confirmed with biplanar fluoroscopic imaging. Following negative aspiration, 0.5 mL of Bupivacaine 0.5% was injected at each location. The needles were re-styletted and withdrawn. The patient's skin was cleaned with alcohol and the injection sites covered with bandages.    EBL: None    COMPLICATIONS: None    The patient was monitored until meeting  established discharge criteria. Vital signs remained stable throughout the procedure and in the recovery area. There were no immediate complications and the patient tolerated the procedure well. Sensory and motor exam was unchanged from baseline. The patient received written discharge instructions prior to discharge.    FOLLOW UP: Clinic staff will call to schedule #2 medial branch block    ADDITIONAL NOTES:         [facility]  Wilmer Proctor MD    Codes:  [id number]  [id number]

## 2024-11-15 ENCOUNTER — TELEPHONE (OUTPATIENT)
Dept: PAIN MEDICINE | Facility: CLINIC | Age: 49
End: 2024-11-15
Payer: MEDICARE

## 2024-11-18 ENCOUNTER — TELEPHONE (OUTPATIENT)
Dept: PAIN MEDICINE | Facility: CLINIC | Age: 49
End: 2024-11-18
Payer: MEDICARE

## 2024-11-18 DIAGNOSIS — M54.12 CERVICAL RADICULOPATHY AT C7: Primary | ICD-10-CM

## 2024-11-18 NOTE — TELEPHONE ENCOUNTER
FOLLOW-UP CALL AFTER PROCEDURE    I spoke with the patient regarding how he is feeling after his procedure with Dr. Proctor. Patient reports he is doing well.      Sharad Chun  underwent a bilateral C6/7 and C7/T1 cervical medial branch blocks on 11/14/2024.    Patient reported 80% pain relief that lasted for multiple hours.      Today, the patient reports pain has returned to baseline after 24 hours      Patient denies side effects or complications  Patient does not have any questions or concerns at this time    Disposition:      I provided information regarding date of next procedure, and that the surgery center will call the day before with his /her arrival time. Patient verbalized understanding.      Location of procedure:[address] [facility] [address] [place] [address] ([facility] [place]) . Patient instructed to check in at the registration desk. Patient verbalized understanding      I advised that patient must have a , and that the  must remain in the premises until after the procedure is completed and the patient is ready to be discharged. Patient verbalized understanding.      Reminded patient of instructions to stop blood thinners when indicated    Reminded NPO status: Nothing by mouth (eat or drink) for at least 8 hours prior to the procedure. Patient can take medications with a sip of water. Patient verbalized understanding

## 2024-12-05 ENCOUNTER — OUTSIDE FACILITY SERVICE (OUTPATIENT)
Dept: PAIN MEDICINE | Facility: CLINIC | Age: 49
End: 2024-12-05
Payer: MEDICARE

## 2024-12-05 ENCOUNTER — DOCUMENTATION (OUTPATIENT)
Dept: PAIN MEDICINE | Facility: CLINIC | Age: 49
End: 2024-12-05

## 2024-12-05 NOTE — PROGRESS NOTES
[facility]  [address] [place] [address]    PROCEDURE: #2 Fluoroscopically-guided bilateral C6,7,8 Cervical Medial Branch Nerve Blocks targeting the bilateral C6/7 and C7/T1 facet joints     PRE-OP DIAGNOSIS: Cervical spondylosis  POST-OP DIAGNOSIS: Same    CONSENT: Risks, benefits and options were explained to the patient, all questions were answered and written informed consent was obtained.    ANESTHESIA: Moderate sedation was required to maintain comfort, safety, and cooperation during the procedure.  The duration of sedation service was over 10 minutes.  Patient received 3mg IV Versed and 0mcg IV fentanyl.  Independent observation and monitoring was performed by Tonja Da Silva.  The patient's level of consciousness and physiologic status was continually monitored with pulse oximetry, EKG from 0911 to 0926.  There were no complications or adverse events during sedation.  After the sedation patient was taken to the recovery area.        PROCEDURE NOTE:  A pre-procedural time out was performed to confirm the correct patient, procedure, side, and site. The patient was placed in prone position. A sterile field was prepped in standard fashion using Chlorhexidine and draped with sterile towels. The selected medial branch nerves were identified using AP and Lateral fluoroscopic view. The overlying skin and subcutaneous tissue was anesthetized with 1% lidocaine. A 25 gauge 3.5 inch spinal needle was advanced using intermittent fluoroscopy toward the center of the articular pillar at the C6,C7,T1 levels. Needle placement was confirmed with biplanar fluoroscopic imaging. Following negative aspiration, 0.5 mL of Bupivacaine 0.5% was injected at each location. The needles were re-styletted and withdrawn. The patient's skin was cleaned with alcohol and the injection sites covered with bandages.    EBL: None    COMPLICATIONS: None    The patient was monitored until meeting  established discharge criteria. Vital signs remained stable throughout the procedure and in the recovery area. There were no immediate complications and the patient tolerated the procedure well. Sensory and motor exam was unchanged from baseline. The patient received written discharge instructions prior to discharge.    FOLLOW UP: Clinic staff will call to schedule RFA    ADDITIONAL NOTES:         [facility]  Wilmer Proctor MD    Codes:  [id number]  [id number]

## 2024-12-06 ENCOUNTER — TELEPHONE (OUTPATIENT)
Dept: PAIN MEDICINE | Facility: CLINIC | Age: 49
End: 2024-12-06
Payer: MEDICARE

## 2024-12-06 NOTE — TELEPHONE ENCOUNTER
"Provider: LIAM    Caller: Sharad Chun \"Mil\"    Relationship to Patient: Self    Pharmacy:     Phone Number: [phone]    Reason for Call: P CALLED TO [facility] THAT 12.10.24 APPT BE A TELEHEALTH DUE TO NO TRANSPORTATION  "

## 2024-12-06 NOTE — TELEPHONE ENCOUNTER
FOLLOW-UP CALL AFTER PROCEDURE    I spoke with the patient regarding how he is feeling after his procedure with Dr. Proctor. Patient reports he is doing well.      Sharad Chun  underwent a #2 bilateral C6/7 and C7/T1 cervical medial branch blocks  on 12/5/2024.      Patient reported 50% pain relief that lasted for multiple hours.      Today, the patient reports pain has returned to baseline after 24 hours      Patient denies side effects or complications  Patient does not have any questions or concerns at this time

## 2024-12-09 DIAGNOSIS — M54.12 CERVICAL RADICULOPATHY AT C7: Primary | ICD-10-CM

## 2024-12-09 NOTE — TELEPHONE ENCOUNTER
Per Elvis, patient does not need to keeo 12/10/2024 appointment. S/w patient and cancelled 12/10/2024 appointment.

## 2024-12-19 ENCOUNTER — OUTSIDE FACILITY SERVICE (OUTPATIENT)
Dept: PAIN MEDICINE | Facility: CLINIC | Age: 49
End: 2024-12-19
Payer: MEDICARE

## 2024-12-19 ENCOUNTER — DOCUMENTATION (OUTPATIENT)
Dept: PAIN MEDICINE | Facility: CLINIC | Age: 49
End: 2024-12-19

## 2024-12-19 NOTE — PROGRESS NOTES
[facility]  [address] [place] [address]      PROCEDURE: Fluoroscopically-guided LEFT C6,C7,C8 cervical Medial Branch Nerve Radiofrequency Ablation (RFA) targeting the LEFT C6/7 and C7/T1 facet joints   PRE-OP DIAGNOSIS: Cervical spondylosis  POST-OP DIAGNOSIS: Same    ANTIPLATELET/ANTICOAGULANT: Were discussed with patient according to RODNEY guidelines.     CONSENT: Risks, benefits and options were explained to the patient, all questions were answered and written informed consent was obtained.    ANESTHESIA: Moderate sedation was required to maintain comfort, safety, and cooperation during the procedure.  The duration of sedation service was over 10 minutes.  Patient received 2mg IV Versed and 100mcg IV fentanyl.  Independent observation and monitoring was performed by Carissa Feliz.  The patient's level of consciousness and physiologic status was continually monitored with pulse oximetry, EKG from 1000 to 1033.  There were no complications or adverse events during sedation.  After the sedation patient was taken to the recovery area.      PROCEDURE NOTE:  A pre-procedural time out was performed to confirm the correct patient, procedure, side, and site. The patient was placed in sidelying position. A sterile field was prepped in standard fashion using [Chlorhexidine] and draped with sterile towels. The selected medial branch nerves were identified using AP fluoroscopic view. The overlying skin and subcutaneous tissue was anesthetized with 1% lidocaine. A [20] gauge 10 cm curved RFA needle with 5 mm tip was advanced using intermittent fluoroscopy toward the center of the articular pillar at the above listed levels.Needle placement was confirmed with biplanar fluoroscopic imaging. Testing was performed at each level with [motor 2 Hz] stimulation to ensure absence of nerve root stimulation. Following negative aspiration, 1 mL of 1% lidocaine was injected at each location.  Continuous lesions were then performed at 80?C for 90 seconds at each location.  2 lesion(s) was performed at each medial branch location. Then, after negative aspiration, 1 cc of 0.5% bupivacaine was injected into each location.  The needles were withdrawn. The patient's skin was cleaned with alcohol and the injection sites covered with bandages.    EBL:none  COMPLICATIONS: None    The patient was monitored until appropriate discharge criteria were met. Vital signs remained stable throughout the procedure and in the recovery area. There were no immediate complications and the patient tolerated the procedure well. Sensory and motor exam was unchanged from baseline.     FOLLOW UP:     ADDITIONAL NOTES:     [facility]  Wilmer Proctor MD       CODES:  [id number]  [id number]  [id number]

## 2025-01-02 ENCOUNTER — DOCUMENTATION (OUTPATIENT)
Dept: PAIN MEDICINE | Facility: CLINIC | Age: 50
End: 2025-01-02

## 2025-01-02 ENCOUNTER — OUTSIDE FACILITY SERVICE (OUTPATIENT)
Dept: PAIN MEDICINE | Facility: CLINIC | Age: 50
End: 2025-01-02
Payer: MEDICARE

## 2025-01-02 NOTE — PROGRESS NOTES
[facility]  [address] [place] [address]      PROCEDURE: Fluoroscopically-guided right C6,7,C8 cervical Medial Branch Nerve Radiofrequency Ablation (RFA) targeting the right C6/7 and C7/T1 facet joints   PRE-OP DIAGNOSIS: Cervical spondylosis  POST-OP DIAGNOSIS: Same    ANTIPLATELET/ANTICOAGULANT: Were discussed with patient according to RODNEY guidelines.     CONSENT: Risks, benefits and options were explained to the patient, all questions were answered and written informed consent was obtained.    ANESTHESIA: Moderate sedation was required to maintain comfort, safety, and cooperation during the procedure.  The duration of sedation service was over 10 minutes.  Patient received 2mg IV Versed and 50mcg IV fentanyl.  Independent observation and monitoring was performed by Nimisha Hernandez.  The patient's level of consciousness and physiologic status was continually monitored with pulse oximetry, EKG from 1127 to 1145.  There were no complications or adverse events during sedation.  After the sedation patient was taken to the recovery area.      PROCEDURE NOTE:  A pre-procedural time out was performed to confirm the correct patient, procedure, side, and site. The patient was placed in sidelying position. A sterile field was prepped in standard fashion using [Chlorhexidine] and draped with sterile towels. The selected medial branch nerves were identified using AP fluoroscopic view. The overlying skin and subcutaneous tissue was anesthetized with 1% lidocaine. A [20] gauge 10 cm curved RFA needle with 5 mm tip was advanced using intermittent fluoroscopy toward the center of the articular pillar at the above listed levels.Needle placement was confirmed with biplanar fluoroscopic imaging. Testing was performed at each level with [motor 2 Hz] stimulation to ensure absence of nerve root stimulation. Following negative aspiration, 1 mL of 1% lidocaine was injected at each location.  Continuous lesions were then performed at 80?C for 90 seconds at each location.  2 lesion(s) was performed at each medial branch location. Then, after negative aspiration, 1 cc of 0.5% bupivacaine was injected into each location.  The needles were withdrawn. The patient's skin was cleaned with alcohol and the injection sites covered with bandages.    EBL:none  COMPLICATIONS: None    The patient was monitored until appropriate discharge criteria were met. Vital signs remained stable throughout the procedure and in the recovery area. There were no immediate complications and the patient tolerated the procedure well. Sensory and motor exam was unchanged from baseline.     FOLLOW UP:     ADDITIONAL NOTES:     [facility]  Wilmer Proctor MD       CODES:  [id number]  [id number]  [id number]

## 2025-01-30 ENCOUNTER — OFFICE VISIT (OUTPATIENT)
Dept: PAIN MEDICINE | Facility: CLINIC | Age: 50
End: 2025-01-30
Payer: MEDICARE

## 2025-01-30 VITALS — WEIGHT: 132 LBS | BODY MASS INDEX: 19.55 KG/M2 | HEIGHT: 69 IN

## 2025-01-30 DIAGNOSIS — M47.812 CERVICAL SPONDYLOSIS WITHOUT MYELOPATHY: ICD-10-CM

## 2025-01-30 DIAGNOSIS — M47.817 LUMBOSACRAL SPONDYLOSIS WITHOUT MYELOPATHY: ICD-10-CM

## 2025-01-30 DIAGNOSIS — M25.511 PERISCAPULAR PAIN OF BOTH SHOULDERS: ICD-10-CM

## 2025-01-30 DIAGNOSIS — M25.512 PERISCAPULAR PAIN OF BOTH SHOULDERS: ICD-10-CM

## 2025-01-30 DIAGNOSIS — M47.817 LUMBOSACRAL SPONDYLOSIS WITHOUT MYELOPATHY: Primary | ICD-10-CM

## 2025-01-30 DIAGNOSIS — M54.12 CERVICAL RADICULOPATHY AT C7: Primary | ICD-10-CM

## 2025-01-30 RX ORDER — PROPRANOLOL HYDROCHLORIDE 10 MG/1
TABLET ORAL
COMMUNITY
Start: 2025-01-29

## 2025-01-30 NOTE — PROGRESS NOTES
Referring Physician: No referring provider defined for this encounter.    Primary Physician: Medhat Georges APRN    CHIEF COMPLAINT or REASON FOR VISIT: Follow-up      Initial history of present illness on 09/23/2024:  Mr. Sharad Chun is 49 y.o. male who presents as a new patient referral for evaluation and treatment of chronic neck pain with radiation into right upper extremity.  Patient does have a prior history of C5-6 ACDF with Dr. Metcalf in August 2021.  Subsequent to that injection he was doing relatively well without cervical radicular pain however in approximately July 2024 he redeveloped right-sided neck pain with radiation into the right hand.  He describes numbness tingling and pain into the long and ring fingers of his right hand.  He states that it does feel like it did before his surgery.  He was evaluated again by neurosurgery, Amandeep Williamson PA-C, who referred for consideration of epidural steroid injection as well as ordered an EMG/NCV.  That study is pending.  Patient denies any clumsiness or weakness of the upper extremities, falls, unsteady gait.  He has been treated for some time for various chronic pains with Percocet 10 mg, gabapentin.  Then, in July 2024 this was rotated to methadone.  Then, according to the PDMP he was started on Suboxone in September.    Interval history:  Patient returns to clinic today after undergoing bilateral cervical medial branch blocks and subsequent radiofrequency ablation.  Additionally, there were discussions regarding taking over his gabapentin prescription however given his noncompliant urine drug screens we are unable to provide him with refills of this medication.  Last UDS on 11/12/2024 demonstrates positive buprenorphine consistent with prescription and oxycodone which was not consistent with Tony/PDMP.  So far, he reports modest relief from his bilateral cervical RFA.  He has noticed an improvement in his axial neck pain.  Today, he primary  complains of axial low back pain.  This has been going on since 2020.  He has undergone a lumbar MRI as recently as 3/11/2024.  I do not have these images for interpretation but radiology report suggests lumbar spondylosis with multiple areas of mild narrowing.  He denies any new injuries or events since his previous appointment.  He states he has been focused on treating his neck pain for his lower back.  He is currently attending a Suboxone clinic which he reports has significantly helped him with his addiction.  Patient denies any bowel or bladder dysfunction, lower extremity weakness, new onset saddle anesthesia or unexplained weight loss.  He has had lumbar spinal injections in the past without relief but states he has never had lumbar medial branch blocks or ablations.  Lastly, he does complain of some periscapular thoracic back pain.    Interventions:  9/24/2024: Right paramedian DARIA with 100% relief of right upper extremity radicular symptoms  11/14/2024: Bilateral C6/7 and C7/T1 MBB with 80% relief  12/5/2024: Bilateral C6/7 and C7/T1 MBB with 50% relief  12/19/2024: Left C6/7 and C7/T1 RFA with 30% relief ongoing  1/02/2025: Right C6/7 and C7/T1 RFA with 30% relief ongoing    Objective Pain Scoring:   BRIEF PAIN INVENTORY:  Total score:   Pain Score    01/30/25 1233   PainSc:   9   PainLoc: Back  Comment: reports neck is a 6          PHQ-2:    PHQ-9:    Opioid Risk Tool:         Review of Systems:   ROS negative except as otherwise noted     Past Medical History:   Past Medical History:   Diagnosis Date    Arthritis     Cervical disc disorder 07/01/2020    Surgery cured nerve pain in arm.    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    GERD (gastroesophageal reflux disease)     Headache     Heart murmur     grew out of it     Hepatitis C     Hiatal hernia     still  present     Hyperlipidemia     Joint pain 2012    Low back pain 2021    Lumbosacral disc disease 03/10/2021    MRI shows bulging disc in lower back    Neck pain     Peripheral neuropathy     Rheumatoid arthritis 2010         Past Surgical History:   Past Surgical History:   Procedure Laterality Date    ANTERIOR CERVICAL DISCECTOMY W/ FUSION Right 2021    Procedure: CERVICAL DISCECTOMY ANTERIOR WITH FUSION C5-6;  Surgeon: Mikel Metcalf MD;  Location: [facility];  Service: Neurosurgery;  Laterality: Right;    JOINT REPLACEMENT      NECK SURGERY  2021    ROTATOR CUFF REPAIR Right 2020         Family History   Family History   Problem Relation Age of Onset    Arthritis Mother     Alcohol abuse Father         I got PTSD from being the child of an alcoholic.    Cancer Maternal Grandfather          Social History   Social History     Socioeconomic History    Marital status: Single   Tobacco Use    Smoking status: Former     Current packs/day: 0.00     Average packs/day: 0.3 packs/day for 33.9 years (8.5 ttl pk-yrs)     Types: Cigarettes     Start date: 1990     Quit date: 2024     Years since quittin.4     Passive exposure: Past    Smokeless tobacco: Former    Tobacco comments:     I used Wellbutrin and Nicorette to quit smoking.   Vaping Use    Vaping status: Never Used   Substance and Sexual Activity    Alcohol use: Not Currently     Comment: I might have drank alcohol 50 times in my life.    Drug use: Not Currently     Frequency: 7.0 times per week     Types: Marijuana     Comment: No marijuana since 2022.    Sexual activity: Not Currently     Partners: Female     Comment: ED        Medications:     Current Outpatient Medications:     buprenorphine-naloxone (SUBOXONE) 8-2 MG per SL tablet, , Disp: , Rfl:     mirtazapine (REMERON SOL-TAB) 15 MG disintegrating tablet, Take 1 tablet by mouth., Disp: , Rfl:     ondansetron ODT (ZOFRAN-ODT) 4 MG disintegrating tablet,  "Place 1 tablet on the tongue Every 8 (Eight) Hours As Needed for Nausea or Vomiting., Disp: 12 tablet, Rfl: 0    propranolol (INDERAL) 10 MG tablet, , Disp: , Rfl:     tiZANidine (ZANAFLEX) 4 MG tablet, Take 1 tablet by mouth Every Night., Disp: , Rfl:     gabapentin (NEURONTIN) 800 MG tablet, 3 (Three) Times a Day., Disp: , Rfl:     ProAir  (90 Base) MCG/ACT inhaler, , Disp: , Rfl:         Physical Exam:     Vitals:    01/30/25 1233   Weight: 59.9 kg (132 lb)   Height: 175.3 cm (69.02\")   PainSc:   9   PainLoc: Back  Comment: reports neck is a 6            General: Alert and oriented, No acute distress.   HEENT: Normocephalic, atraumatic.   Cardiovascular: No gross edema  Respiratory: Respirations are non-labored    Cervical Spine:   No masses or atrophy  Range of motion - Flexion normal. Extension normal. Lateral rotation normal.   Palpation -tender bilaterally  Facet loading-tender bilaterally  Spurling's -positive right    Tender to palpation at bilateral parascapular border.      Lumbar:  Inspection: no gross abnormality  ROM: Limited secondary to pain  Facet Loading: positive bilaterally  Paraspinal muscle palpation: Positive left  Spinous process palpation: nontender  Facet palpation: Positive bilateral  MOTOR: LLE: 5/5 throughout RLE: 5/5 throughout  REFLEX: 2+ reflexes in BLE with negative ankle clonus bilaterally  Straight Leg Raise: Positive bilaterally  Negative Jenna's bilaterally       Motor Exam:    Strength: Rate on 1-5 scale Right Left    C5-Elbow flexion, Deltoid 5/5  5/5    C6-Wrist extension 5/5  5/5    C7- Elbow / finger extension 5/5  5/5    C8- Finger flexion 5/5  5/5    T1- Intrins[employer] hand 5/5  5/5       Sensory Exam: Altered sensation right C7 dermatome.       Neurologic: Cranial Nerves II-XII are grossly intact.   Jeff’s -negative bilaterally      Psychiatric: Cooperative.   Gait: Normal   Assistive Devices: None    Imaging Studies:   Results for orders placed during the " hospital encounter of 07/22/21    MRI Lumbar Spine Without Contrast    Narrative  EXAMINATION: MRI LUMBAR SPINE WO CONTRAST-    INDICATION: Radiculopathy; M54.9-Dorsalgia, unspecified.    TECHNIQUE: Routine multiplanar imaging was obtained of the lumbar spine  without the administration of gadolinium contrast.    COMPARISON: None.    FINDINGS: The vertebral body height is preserved. There is mild disc  desiccation seen at the L2-L3 level. The facets are well aligned. Normal  signal intensity within the conus. Spinal cord terminates at the T12-L1  disc space. Facets are well aligned. Pedicles are intact. No abnormal  mass or fluid collection is seen within the paraspinal muscles.    Axial imaging reveals at the L1-L2 level no significant central spinal  canal stenosis or nerve root contact or compromise.    At the L2-L3 level, there is a broad-based disc bulge with some  lateralization to the left creating narrowing of the left neuroforamina.  No definite nerve root contact or compromise.    At the L3-L4 level, there is a broad-based disc bulge with some  lateralization to the left creating narrowing of the left neuroforamina.  No nerve root contact or compromise.    At the L4-L5 disc space, there is no significant central spinal canal  stenosis or nerve root contact or compromise. No neural foraminal  narrowing.    At the L5-S1 level, there is no significant central spinal canal  stenosis or nerve root contact or compromise.    Impression  There is small posterior disc osteophyte complexes creating  no significant central spinal canal stenosis or nerve root contact or  compromise. No neuroforaminal narrowing.    D:  07/22/2021  E:  07/22/2021    This report was finalized on 7/23/2021 5:57 PM by Dr. Mya Tena MD.        Independent review of radiographic imaging: Available for my interpretation is a cervical MRI dated August 19, 2024 demonstrating presence of C5/C6 anterior fusion hardware; bilateral C6/C7  neuroforaminal stenosis.    Impression & Plan:       09/23/2024: Sharad Chun is a 49 y.o. male with past medical history significant for C5-6 ACDF with Dr. Metcalf 2021, right rotator cuff repair, COPD, GERD, opioid use disorder (Suboxone) who presents to the pain clinic for evaluation and treatment of chronic neck pain with radiation right upper extremity and hand.  11/4/2024: Good relief of right upper extremity pain after DARIA.  Will plan for bilateral CMBB for continued axial neck pain.  Briefly discussed LESI for low back pain with radiation to the bilateral lower extremities.  Patient politely declined.  11/12/2024: Proceed with cervical medial branch blocks as originally planned.  Will obtain new compliance UDS.  If UDS is compliant will continue to refill gabapentin.  If UDS is not compliant we will proceed with a gabapentin taper.  Patient voiced understanding  1/30/2025: Modest relief from bilateral cervical RFA ongoing.  Low back pain consistent with lumbosacral spondylosis.  Discussed bilateral L2/3 and L3/4 MBB/RFA.  Patient would like to proceed.    1. Cervical radiculopathy at C7    2. Lumbosacral spondylosis without myelopathy    3. Cervical spondylosis without myelopathy    4. Periscapular pain of both shoulders              PLAN:  1. Medication Recommendations:   Poor candidate for controlled substances given history of noncompliant urine drug screens    2. Physical Therapy: Continue HEP    3. Psychological: defer    4. Complementary and alternative (CAM) Therapies: Recommend heat, ice, TENS unit.    5. Labs/Diagnostic studies:     6. Imaging: Lumbar MRI report reviewed from 3/11/2024.  I do not have these images for personal interpretation    7. Interventions: Schedule bilateral L4/5 and L5/S1 lumbar medial branch blocks (60454, [id number]). If the first block provides diagnostic relief we will schedule a second set of medial branch blocks.  If second set of medial branch blocks provides diagnostic  relief we will schedule rhizotomy.   -Risks of this procedure include bleeding, infection, damage to surrounding structures which may exacerbate symptoms  Can consider repeat right paramedian cervical interlaminar epidural steroid injection (20703).    8. Referrals: None indicated     9. Records:     10. Lifestyle goals:    Follow-up 4 months      [facility]  Chino Poe PA-C          Quality Metrics:

## 2025-01-31 ENCOUNTER — HOSPITAL ENCOUNTER (EMERGENCY)
Facility: HOSPITAL | Age: 50
Discharge: HOME | End: 2025-01-31
Attending: HOSPITALIST
Payer: MEDICARE

## 2025-01-31 ENCOUNTER — APPOINTMENT (OUTPATIENT)
Dept: CT IMAGING | Facility: HOSPITAL | Age: 50
End: 2025-01-31
Payer: MEDICARE

## 2025-01-31 VITALS
SYSTOLIC BLOOD PRESSURE: 147 MMHG | OXYGEN SATURATION: 99 % | WEIGHT: 130 LBS | HEIGHT: 69 IN | DIASTOLIC BLOOD PRESSURE: 64 MMHG | BODY MASS INDEX: 19.26 KG/M2 | HEART RATE: 86 BPM | RESPIRATION RATE: 16 BRPM | TEMPERATURE: 98.1 F

## 2025-01-31 DIAGNOSIS — R11.2 NAUSEA AND VOMITING, UNSPECIFIED VOMITING TYPE: ICD-10-CM

## 2025-01-31 DIAGNOSIS — R51.9 NONINTRACTABLE HEADACHE, UNSPECIFIED CHRONICITY PATTERN, UNSPECIFIED HEADACHE TYPE: Primary | ICD-10-CM

## 2025-01-31 LAB
ALBUMIN SERPL-MCNC: 4.5 G/DL (ref 3.4–4.8)
ALBUMIN/GLOB SERPL: 1.6 {RATIO} (ref 0.8–2)
ALP SERPL-CCNC: 75 U/L (ref 25–100)
ALT SERPL-CCNC: 10 U/L (ref 4–36)
ANION GAP SERPL CALCULATED.3IONS-SCNC: 11 MMOL/L (ref 3–16)
AST SERPL-CCNC: 18 U/L (ref 8–33)
BASOPHILS # BLD: 0.1 K/UL (ref 0–0.1)
BASOPHILS NFR BLD: 0.6 %
BILIRUB SERPL-MCNC: 0.7 MG/DL (ref 0.3–1.2)
BUN SERPL-MCNC: 17 MG/DL (ref 6–20)
CALCIUM SERPL-MCNC: 9.7 MG/DL (ref 8.5–10.5)
CHLORIDE SERPL-SCNC: 103 MMOL/L (ref 98–107)
CO2 SERPL-SCNC: 27 MMOL/L (ref 20–30)
CREAT SERPL-MCNC: 0.8 MG/DL (ref 0.4–1.2)
EOSINOPHIL # BLD: 0.1 K/UL (ref 0–0.4)
EOSINOPHIL NFR BLD: 0.8 %
ERYTHROCYTE [DISTWIDTH] IN BLOOD BY AUTOMATED COUNT: 12.1 % (ref 11–16)
FLUAV AG NPH QL: NEGATIVE
FLUBV AG NPH QL: NEGATIVE
GFR SERPLBLD CREATININE-BSD FMLA CKD-EPI: >90 ML/MIN/{1.73_M2}
GLOBULIN SER CALC-MCNC: 2.9 G/DL
GLUCOSE SERPL-MCNC: 88 MG/DL (ref 74–106)
HCT VFR BLD AUTO: 42.1 % (ref 40–54)
HGB BLD-MCNC: 14.5 G/DL (ref 13–18)
IMM GRANULOCYTES # BLD: 0 K/UL
IMM GRANULOCYTES NFR BLD: 0.1 % (ref 0–5)
LYMPHOCYTES # BLD: 1.4 K/UL (ref 1.5–4)
LYMPHOCYTES NFR BLD: 17.8 %
MCH RBC QN AUTO: 29.4 PG (ref 27–32)
MCHC RBC AUTO-ENTMCNC: 34.4 G/DL (ref 31–35)
MCV RBC AUTO: 85.2 FL (ref 80–100)
MONOCYTES # BLD: 0.4 K/UL (ref 0.2–0.8)
MONOCYTES NFR BLD: 4.8 %
NEUTROPHILS # BLD: 5.9 K/UL (ref 2–7.5)
NEUTS SEG NFR BLD: 75.9 %
PLATELET # BLD AUTO: 286 K/UL (ref 150–400)
PMV BLD AUTO: 9.2 FL (ref 6–10)
POTASSIUM SERPL-SCNC: 4.4 MMOL/L (ref 3.4–5.1)
PROT SERPL-MCNC: 7.4 G/DL (ref 6.4–8.3)
RBC # BLD AUTO: 4.94 M/UL (ref 4.5–6)
SARS-COV-2 RDRP RESP QL NAA+PROBE: NOT DETECTED
SODIUM SERPL-SCNC: 141 MMOL/L (ref 136–145)
WBC # BLD AUTO: 7.7 K/UL (ref 4–11)

## 2025-01-31 PROCEDURE — 6360000002 HC RX W HCPCS: Performed by: HOSPITALIST

## 2025-01-31 PROCEDURE — 70450 CT HEAD/BRAIN W/O DYE: CPT

## 2025-01-31 PROCEDURE — 87635 SARS-COV-2 COVID-19 AMP PRB: CPT

## 2025-01-31 PROCEDURE — 80053 COMPREHEN METABOLIC PANEL: CPT

## 2025-01-31 PROCEDURE — 96375 TX/PRO/DX INJ NEW DRUG ADDON: CPT

## 2025-01-31 PROCEDURE — 70498 CT ANGIOGRAPHY NECK: CPT

## 2025-01-31 PROCEDURE — 2580000003 HC RX 258: Performed by: HOSPITALIST

## 2025-01-31 PROCEDURE — 6360000004 HC RX CONTRAST MEDICATION: Performed by: HOSPITALIST

## 2025-01-31 PROCEDURE — 36415 COLL VENOUS BLD VENIPUNCTURE: CPT

## 2025-01-31 PROCEDURE — 85025 COMPLETE CBC W/AUTO DIFF WBC: CPT

## 2025-01-31 PROCEDURE — 87804 INFLUENZA ASSAY W/OPTIC: CPT

## 2025-01-31 PROCEDURE — 6360000002 HC RX W HCPCS: Performed by: EMERGENCY MEDICINE

## 2025-01-31 PROCEDURE — 99285 EMERGENCY DEPT VISIT HI MDM: CPT

## 2025-01-31 PROCEDURE — 96361 HYDRATE IV INFUSION ADD-ON: CPT

## 2025-01-31 PROCEDURE — 96374 THER/PROPH/DIAG INJ IV PUSH: CPT

## 2025-01-31 PROCEDURE — 70496 CT ANGIOGRAPHY HEAD: CPT

## 2025-01-31 PROCEDURE — 96376 TX/PRO/DX INJ SAME DRUG ADON: CPT

## 2025-01-31 RX ORDER — ONDANSETRON 2 MG/ML
4 INJECTION INTRAMUSCULAR; INTRAVENOUS ONCE
Status: COMPLETED | OUTPATIENT
Start: 2025-01-31 | End: 2025-01-31

## 2025-01-31 RX ORDER — KETOROLAC TROMETHAMINE 30 MG/ML
30 INJECTION, SOLUTION INTRAMUSCULAR; INTRAVENOUS ONCE
Status: COMPLETED | OUTPATIENT
Start: 2025-01-31 | End: 2025-01-31

## 2025-01-31 RX ORDER — METOCLOPRAMIDE HYDROCHLORIDE 5 MG/ML
10 INJECTION INTRAMUSCULAR; INTRAVENOUS ONCE
Status: COMPLETED | OUTPATIENT
Start: 2025-01-31 | End: 2025-01-31

## 2025-01-31 RX ORDER — PROPRANOLOL HYDROCHLORIDE 10 MG/1
10 TABLET ORAL DAILY
COMMUNITY

## 2025-01-31 RX ORDER — 0.9 % SODIUM CHLORIDE 0.9 %
1000 INTRAVENOUS SOLUTION INTRAVENOUS ONCE
Status: COMPLETED | OUTPATIENT
Start: 2025-01-31 | End: 2025-01-31

## 2025-01-31 RX ORDER — BUPRENORPHINE HYDROCHLORIDE AND NALOXONE HYDROCHLORIDE DIHYDRATE 8; 2 MG/1; MG/1
1 TABLET SUBLINGUAL 3 TIMES DAILY
COMMUNITY

## 2025-01-31 RX ORDER — DIPHENHYDRAMINE HYDROCHLORIDE 50 MG/ML
25 INJECTION INTRAMUSCULAR; INTRAVENOUS ONCE
Status: COMPLETED | OUTPATIENT
Start: 2025-01-31 | End: 2025-01-31

## 2025-01-31 RX ORDER — PROCHLORPERAZINE EDISYLATE 5 MG/ML
10 INJECTION INTRAMUSCULAR; INTRAVENOUS ONCE
Status: COMPLETED | OUTPATIENT
Start: 2025-01-31 | End: 2025-01-31

## 2025-01-31 RX ORDER — IOPAMIDOL 755 MG/ML
100 INJECTION, SOLUTION INTRAVASCULAR
Status: COMPLETED | OUTPATIENT
Start: 2025-01-31 | End: 2025-01-31

## 2025-01-31 RX ADMIN — DIPHENHYDRAMINE HYDROCHLORIDE 25 MG: 50 INJECTION INTRAMUSCULAR; INTRAVENOUS at 19:46

## 2025-01-31 RX ADMIN — SODIUM CHLORIDE 1000 ML: 9 INJECTION, SOLUTION INTRAVENOUS at 19:46

## 2025-01-31 RX ADMIN — SODIUM CHLORIDE 40 MG: 9 INJECTION INTRAMUSCULAR; INTRAVENOUS; SUBCUTANEOUS at 19:46

## 2025-01-31 RX ADMIN — KETOROLAC TROMETHAMINE 30 MG: 30 INJECTION, SOLUTION INTRAMUSCULAR; INTRAVENOUS at 19:47

## 2025-01-31 RX ADMIN — DIPHENHYDRAMINE HYDROCHLORIDE 25 MG: 50 INJECTION, SOLUTION INTRAMUSCULAR; INTRAVENOUS at 20:23

## 2025-01-31 RX ADMIN — ONDANSETRON 4 MG: 2 INJECTION INTRAMUSCULAR; INTRAVENOUS at 20:08

## 2025-01-31 RX ADMIN — METOCLOPRAMIDE 10 MG: 5 INJECTION, SOLUTION INTRAMUSCULAR; INTRAVENOUS at 20:23

## 2025-01-31 RX ADMIN — PROCHLORPERAZINE EDISYLATE 10 MG: 5 INJECTION INTRAMUSCULAR; INTRAVENOUS at 20:23

## 2025-01-31 RX ADMIN — IOPAMIDOL 100 ML: 755 INJECTION, SOLUTION INTRAVENOUS at 19:35

## 2025-01-31 ASSESSMENT — PAIN SCALES - GENERAL
PAINLEVEL_OUTOF10: 5
PAINLEVEL_OUTOF10: 6

## 2025-01-31 ASSESSMENT — PAIN - FUNCTIONAL ASSESSMENT
PAIN_FUNCTIONAL_ASSESSMENT: 0-10
PAIN_FUNCTIONAL_ASSESSMENT: 0-10

## 2025-01-31 ASSESSMENT — PAIN DESCRIPTION - LOCATION: LOCATION: HEAD

## 2025-01-31 ASSESSMENT — LIFESTYLE VARIABLES
HOW MANY STANDARD DRINKS CONTAINING ALCOHOL DO YOU HAVE ON A TYPICAL DAY: PATIENT DOES NOT DRINK
HOW OFTEN DO YOU HAVE A DRINK CONTAINING ALCOHOL: NEVER

## 2025-01-31 ASSESSMENT — PAIN DESCRIPTION - DESCRIPTORS: DESCRIPTORS: ACHING

## 2025-01-31 NOTE — ED PROVIDER NOTES
[facility]  EMERGENCY DEPARTMENT ENCOUNTER        Pt Name: Yunior Jack  MRN: [id number]  Birthdate 1975  Date of evaluation: 1/31/2025  Provider: Ole King DO  PCP: Edward Javed APRN - NP  Note Started: 6:40 PM EST 1/31/25    CHIEF COMPLAINT       Chief Complaint   Patient presents with    Vomiting    Headache       HISTORY OF PRESENT ILLNESS: 1 or more Elements     History from : Patient    Limitations to history : None    Yunior Jack is a 49 y.o. male who presents to the emergency department for headache and nausea vomiting.  Patient states he awoke this morning with a headache which she states was much worse this morning than what it is now.  He states that he had nausea vomiting with it.  He has tried to eat and drink things several times a day but cannot tolerate it.  Patient states he was able to lay back down upon awaking his headache had improved slightly but he still has nausea vomiting.  He was brought in by EMS they did give him some Zofran had a small amount of fluids prior to arrival here.  Patient states he normally does not have headaches or if he does have 1 is not very often.  Patient states it is bitemporal in nature he denies any fall trauma or injury to the area.  He does see pain management physician and to take Suboxone he been having some episodes of hypertension there so they had started him on propranolol 10 mg twice as needed for high blood pressure.  Patient states that they also told him that it would help with his anxiety also.  Patient denies any photophobia with the headache but he does have phonophobia.  Denies any visual changes.  Denies any numbness tingling weakness of the face.  Denies difficulty speech.  Denies any numbness tingling weakness of extremities out of ordinary.  Patient states he is just been generalized weak today overall with some fatigue.  Positive headache.  Denies earache.  Denies sore throat.  Denies nasal  needed for Wheezing    ATENOLOL (TENORMIN) 25 MG TABLET    Take 25 mg by mouth daily    BUPROPION (WELLBUTRIN) 75 MG TABLET    Take 1 tablet by mouth 2 times daily Not sure of dosage at this time    GABAPENTIN (NEURONTIN) 300 MG CAPSULE    Take 800 mg by mouth 3 times daily.    KETOROLAC (TORADOL) 10 MG TABLET    Take 1 tablet by mouth every 6 hours as needed for Pain    OMEPRAZOLE (PRILOSEC) 10 MG DELAYED RELEASE CAPSULE    Take 2 capsules by mouth daily    PANTOPRAZOLE (PROTONIX) 40 MG TABLET    TAKE ONE TABLET BY MOUTH EVERY MORNING (BEFORE BREAKFAST)    SIMVASTATIN (ZOCOR) 40 MG TABLET    Take 1 tablet by mouth nightly              (Please note that portions of this note were completed with a voice recognition program.  Efforts were made to edit the dictations but occasionally words are mis-transcribed.)    Ole King DO (electronically signed)           Ole King DO  01/31/25 1912

## 2025-01-31 NOTE — ED TRIAGE NOTES
Pt arrived to ER via [facility].     Pt reports he woke up this morning at 0730 with a \"severe migraine\",  took morning medications and started vomiting. Conitnued with n/v since  No history of HA/migraines.  Reports has taken multiple does of Zofran, continues to have vomiting.   +chills  -diarrhea  -fever  Report HA is better at this time rates 6/10, states this AM was 10/10.    Pt reports that he seen at his pain clinic on 1/28/25, for his weekly visit, they noted that his last 3 visits BP has been elevated, started him propanolol 10mg daily.

## 2025-02-01 NOTE — ED PROVIDER NOTES
ARTERY: No stenosis involving the common, external or internal carotid artery INTRACRANIAL ANTERIOR CIRCULATION: The intracranial internal carotid arteries, anterior cerebral arteries, and middle cerebral arteries demonstrate no occlusion or stenosis. No evidence for aneurysm or arteriovenous malformation. INTRACRANIAL POSTERIOR CIRCULATION: The bilateral vertebral arteries, basilar artery and posterior cerebral arteries demonstrate no occlusion or stenosis. No evidence for aneurysm or arteriovenous malformation. INTRACRANIAL VENOUS SYSTEM: No evidence for intracranial venous thrombosis.     1. No arterial stenosis or dissection in the neck 2. No intracranial large vessel arterial stenosis or dissection 3. No intracranial mass or evidence of acute intracranial abnormality Electronically signed by Jeremi Borges MD    CTA HEAD W CONTRAST    Result Date: 1/31/2025  PROCEDURE: CTA HEAD W CONTRAST, CTA NECK W CONTRAST 1/31/2025 19:25 EST INDICATION: Headache, nausea vomiting COMPARISON: Noncontrast head CT 1/31/2025 TECHNIQUE: Axial CT imaging obtained through the head and neck following infusion of IV contrast. Axial images, multiplanar reformatted images, and maximum intensity projection images were reviewed for CT angiographic technique. CT radiation dose optimization techniques (automated exposure control, and use of iterative reconstruction techniques, or adjustment of the mA and/or kV according to patient size) were used to limit patient radiation dose. FINDINGS: NECK SOFT TISSUES: No neck soft tissue mass or lymphadenopathy. VISUALIZED MEDIASTINUM: No mass or lymphadenopathy. VISUALIZED LUNG APICES: Clear. BONES: No destructive osseous process. C5-C6 ACDF with mature bone fusion. AORTIC ARCH: Standard three-vessel aortic arch anatomy is present. INNOMINATE ARTERY: Normal. RIGHT SUBCLAVIAN ARTERY: Normal. RIGHT VERTEBRAL ARTERY: Normal. RIGHT CAROTID ARTERY: No stenosis involving the common, external or internal  PHU GRESHAM            Final Impression      1. Nonintractable headache, unspecified chronicity pattern, unspecified headache type    2. Nausea and vomiting, unspecified vomiting type        DISPOSITION Decision To Discharge 01/31/2025 09:11:35 PM   DISPOSITION CONDITION Stable          (Please note that portions of this note may have been completed with a voice recognition program. Efforts were made to edit the dictations but occasionally words are mis-transcribed.)    Anmol Garcia MD   [employer]        Anmol Garcia MD  01/31/25 7891

## 2025-02-01 NOTE — DISCHARGE INSTRUCTIONS
Please continue all current medications as previously scheduled, follow-up with PCP in the morning.    If any new/acute symptoms or worsening of current presentation please go to the closest urgent care or emergency room for prompt reevaluation

## 2025-02-12 ENCOUNTER — TELEPHONE (OUTPATIENT)
Dept: PAIN MEDICINE | Facility: CLINIC | Age: 50
End: 2025-02-12
Payer: MEDICARE

## 2025-02-12 NOTE — TELEPHONE ENCOUNTER
"    Caller: Sharad Chun \"Mil\"    Relationship to patient: Self    Best call back number: [phone]    Chief complaint: bilateral L4/5 and L5/S1 lumbar medial branch blocks     Type of visit: PROCEDURE     Requested date: ASAP      If rescheduling, when is the original appointment: 02/13/2025     "

## 2025-02-18 ENCOUNTER — DOCUMENTATION (OUTPATIENT)
Dept: PAIN MEDICINE | Facility: CLINIC | Age: 50
End: 2025-02-18

## 2025-02-18 ENCOUNTER — OUTSIDE FACILITY SERVICE (OUTPATIENT)
Dept: PAIN MEDICINE | Facility: CLINIC | Age: 50
End: 2025-02-18
Payer: MEDICARE

## 2025-02-18 NOTE — PROGRESS NOTES
[facility]  [address] [place] [address]    PROCEDURE: Fluoroscopically-guided bilateral L3,4,5 Lumbar Medial Branch Nerve Blocks targeting the bilateral L4/5 and L5/S1 facet joints  PRE-OP DIAGNOSIS: Lumbar spondylosis  POST-OP DIAGNOSIS: Lumbar spondylosis    ANTIPLATELET/ANTICOAGULANT STOP DATE: Discussed with the patient held according to RODNEY guidelines    CONSENT: Risks, benefits and options were explained to the patient, all questions were answered and written informed consent was obtained.  ANESTHESIA: Moderate sedation was required to maintain comfort, safety, and cooperation during the procedure.  The duration of sedation service was over 10 minutes.  Patient received 1mg IV Versed and 0mcg IV fentanyl.  Independent observation and monitoring was performed by Sonam Da Silva.  The patient's level of consciousness and physiologic status was continually monitored with pulse oximetry, EKG from 8:26 to 08:36.  There were no complications or adverse events during sedation.  After the sedation patient was taken to the recovery area.    PROCEDURE NOTE:  A pre-procedural time out was performed to confirm the correct patient, procedure, side, and site. A sterile field was prepped in standard fashion using Chlorhexidine and draped with sterile towels. The selected medial branch nerves were identified using an ipsilateral oblique fluoroscopic view. A 25 gauge 3.5 inch spinal needle was advanced using intermittent fluoroscopy toward the junction of the transverse process and superior articulating process targeting the above listed medial branch nerves. L5 primary dorsal ramus nerve was targeted at the junction of the sacral ala and the sacral articular process. Needle placement was confirmed with biplanar fluoroscopic imaging. Following negative aspiration, 1 mL of bupivacaine 0.5% was injected at each location. The needles were re-styletted and withdrawn. The patient's  skin was cleaned with alcohol and the injection sites covered with bandages.   EBL: None  COMPLICATIONS: None      The patient was monitored until meeting established discharge criteria.  Vital signs remained stable throughout the procedure and in the recovery area. There were no immediate complications and the patient tolerated the procedure well. Sensory and motor exam was unchanged from baseline. The patient received written discharge instructions prior to discharge.  FOLLOW UP: As scheduled  ADDITIONAL NOTES: Clinic staff will call to schedule #2 medial branch block     [facility]    Wilmer Proctor MD    Codes:  [id number]  [id number]

## 2025-02-20 ENCOUNTER — TELEPHONE (OUTPATIENT)
Dept: PAIN MEDICINE | Facility: CLINIC | Age: 50
End: 2025-02-20
Payer: MEDICARE

## 2025-02-27 ENCOUNTER — TELEPHONE (OUTPATIENT)
Dept: PAIN MEDICINE | Facility: CLINIC | Age: 50
End: 2025-02-27
Payer: MEDICARE

## 2025-02-27 NOTE — TELEPHONE ENCOUNTER
FOLLOW-UP CALL AFTER PROCEDURE    I spoke with the patient regarding how he is feeling after his procedure with Dr. Proctor. Patient reports he is doing well.      Sharad Chun  underwent a ilateral L4/5 and L5/S1 lumbar medial branch blocks on 3/18/2025.      Patient reported 10% pain relief that lasted for multiple hours.      Today, the patient reports pain has returned to baseline after 24 hours      Patient denies side effects or complications  Patient does not have any questions or concerns at this time

## 2025-05-05 ENCOUNTER — TELEPHONE (OUTPATIENT)
Dept: PAIN MEDICINE | Facility: CLINIC | Age: 50
End: 2025-05-05

## 2025-05-05 NOTE — TELEPHONE ENCOUNTER
"Provider: LIAM    Caller: Sharad Chun \"Mil\"    Relationship to Patient: Self    Phone Number: [phone]*    Reason for Call: PT CALLED TO R/S APPT TODAY DUE TO BEING SICK. PT IS R/S FOR 5.12.25. PT ASKED IF VIRTUAL VISIT COULD BE DONE TO GO OVER NEXT STEPS FOR TREATMENT OPTIONS. ADVISED PT THAT [facility]/DR GARIBAY DO NOT DO TELEHEALTH. PT REQUESTING MESSAGE BE SENT TO SEE IF SOMEONE CAN CALL/SEND [employer] MESSAGE REGARDING NEXT STEPS, STATING HIS CONDITION IS NOT GETTING ANY BETTER. PLEASE ADVISE.  "

## 2025-05-07 NOTE — TELEPHONE ENCOUNTER
S/w patient, advised him that [facility] does not do telehealth visit, and he will need to be seen in the office to determine net steps in his treatment.

## 2025-05-07 NOTE — TELEPHONE ENCOUNTER
PATIENT CALLED STATING HE RECEIVED ANOTHER CALL BACK. ATTEMPTED TO REACH OFFICE, NO ANSWER. PLEASE ADVISE.     CALL BACK: [phone]

## 2025-05-07 NOTE — TELEPHONE ENCOUNTER
S/w patient to reiterate that we do not offer televisits. Patient verbalized understanding and was thankful for the call back. Closing TE.

## 2025-06-04 ENCOUNTER — OFFICE VISIT (OUTPATIENT)
Dept: PAIN MEDICINE | Facility: CLINIC | Age: 50
End: 2025-06-04
Payer: MEDICARE

## 2025-06-04 VITALS — WEIGHT: 123 LBS | BODY MASS INDEX: 18.22 KG/M2 | HEIGHT: 69 IN

## 2025-06-04 DIAGNOSIS — M54.12 CERVICAL RADICULOPATHY AT C7: ICD-10-CM

## 2025-06-04 DIAGNOSIS — M47.817 LUMBOSACRAL SPONDYLOSIS WITHOUT MYELOPATHY: Primary | ICD-10-CM

## 2025-06-04 DIAGNOSIS — M54.50 MYOFASCIAL LOW BACK PAIN: ICD-10-CM

## 2025-06-04 DIAGNOSIS — M25.512 PERISCAPULAR PAIN OF BOTH SHOULDERS: ICD-10-CM

## 2025-06-04 DIAGNOSIS — M25.511 PERISCAPULAR PAIN OF BOTH SHOULDERS: ICD-10-CM

## 2025-06-04 DIAGNOSIS — M47.812 CERVICAL SPONDYLOSIS WITHOUT MYELOPATHY: ICD-10-CM

## 2025-06-04 DIAGNOSIS — M54.16 LUMBAR RADICULOPATHY: ICD-10-CM

## 2025-06-04 NOTE — PROGRESS NOTES
Referring Physician: No referring provider defined for this encounter.    Primary Physician: Medhat Georges APRN    CHIEF COMPLAINT or REASON FOR VISIT: Follow-up and Back Pain      Initial history of present illness on 09/23/2024:  Mr. Sharad Chun is 49 y.o. male who presents as a new patient referral for evaluation and treatment of chronic neck pain with radiation into right upper extremity.  Patient does have a prior history of C5-6 ACDF with Dr. Metcalf in August 2021.  Subsequent to that injection he was doing relatively well without cervical radicular pain however in approximately July 2024 he redeveloped right-sided neck pain with radiation into the right hand.  He describes numbness tingling and pain into the long and ring fingers of his right hand.  He states that it does feel like it did before his surgery.  He was evaluated again by neurosurgery, Amandeep Williamson PA-C, who referred for consideration of epidural steroid injection as well as ordered an EMG/NCV.  That study is pending.  Patient denies any clumsiness or weakness of the upper extremities, falls, unsteady gait.  He has been treated for some time for various chronic pains with Percocet 10 mg, gabapentin.  Then, in July 2024 this was rotated to methadone.  Then, according to the PDMP he was started on Suboxone in September.    Interval history:  Patient returns to clinic today after undergoing lumbar medial branch blocks.  Unfortunately, this procedure did not provide him with any significant pain relief.  He continues to complain of chronic axial back pain.  Denies any radiation into the lower extremity.  Denies any association with ambulation.  Pain is spread diffusely across the lower lumbar spine.  Patient does report that he had a spinal cord stimulator trial at [facility] approximately 2 to 3 years ago.  He reports there were complications with the trial and he had to go to the hospital to have the trial leads removed.  He  has not tried any recent physical therapy.  Patient denies any bowel or bladder dysfunction, lower extremity weakness, new onset saddle anesthesia or unexplained weight loss.  He is currently treated with Suboxone therapy.  Additionally, he has recently obtained his medical marijuana card and plans on trying medical marijuana for chronic back pain.    Interventions:  9/24/2024: Right paramedian DARIA with 100% relief of right upper extremity radicular symptoms  11/14/2024: Bilateral C6/7 and C7/T1 MBB with 80% relief  12/5/2024: Bilateral C6/7 and C7/T1 MBB with 50% relief  12/19/2024: Left C6/7 and C7/T1 RFA with 30% relief ongoing  1/02/2025: Right C6/7 and C7/T1 RFA with 30% relief ongoing  2/18/2025: Bilateral L4/5 and L5/S1 MBB with minimal relief    Objective Pain Scoring:   BRIEF PAIN INVENTORY:  Total score:   Pain Score    06/04/25 1343   PainSc: 7    PainLoc: Back          PHQ-2:    PHQ-9:    Opioid Risk Tool:         Review of Systems:   ROS negative except as otherwise noted     Past Medical History:   Past Medical History:   Diagnosis Date    Arthritis     Cervical disc disorder 07/01/2020    Surgery cured nerve pain in arm.    Chronic pain disorder 2020    Herniated disks    COPD (chronic obstructive pulmonary disease)     mild     Dizzy     Erectile dysfunction Jan 1, 2018    Had premature ejaculation with no erection. Eventually stopped having erections.    Fractures 1980    Broke right arm twice 1980,1994 left arm 1987    GERD (gastroesophageal reflux disease)     Headache     Heart murmur     grew out of it     Hepatitis C     Hiatal hernia     still present     Hyperlipidemia     Joint pain 2012    Low back pain 03/04/2021    Lumbosacral disc disease 03/10/2021    MRI shows bulging disc in lower back    Neck pain     Peripheral neuropathy     Rheumatoid arthritis 2010    Urinary tract infection Dec 1, 2024    Burning urine, bladder pain         Past Surgical History:   Past Surgical History:    Procedure Laterality Date    ANTERIOR CERVICAL DISCECTOMY W/ FUSION Right 2021    Procedure: CERVICAL DISCECTOMY ANTERIOR WITH FUSION C5-6;  Surgeon: Mikel Metcalf MD;  [facility];  Service: Neurosurgery;  Laterality: Right;    JOINT REPLACEMENT      NECK SURGERY  2021    ROTATOR CUFF REPAIR Right 2020         Family History   Family History   Problem Relation Age of Onset    Arthritis Mother     Alcohol abuse Father         I got PTSD from being the child of an alcoholic.    Cancer Maternal Grandfather     Heart disease Maternal Grandmother          Social History   Social History     Socioeconomic History    Marital status: Single   Tobacco Use    Smoking status: Former     Current packs/day: 0.00     Average packs/day: 0.3 packs/day for 39.4 years (10.1 ttl pk-yrs)     Types: Cigarettes     Start date: 1990     Quit date: 2024     Years since quittin.8     Passive exposure: Past    Smokeless tobacco: Former    Tobacco comments:     I used Wellbutrin and Nicorette to quit smoking.   Vaping Use    Vaping status: Every Day    Substances: Nicotine    Devices: Disposable   Substance and Sexual Activity    Alcohol use: Not Currently     Comment: I might have drank alcohol 50 times in my life.    Drug use: Not Currently     Frequency: 7.0 times per week     Types: Marijuana     Comment: No marijuana since 2022.    Sexual activity: Not Currently     Partners: Female     Comment: ED        Medications:     Current Outpatient Medications:     buprenorphine-naloxone (SUBOXONE) 8-2 MG per SL tablet, , Disp: , Rfl:     mirtazapine (REMERON SOL-TAB) 15 MG disintegrating tablet, Take 1 tablet by mouth., Disp: , Rfl:     ondansetron ODT (ZOFRAN-ODT) 4 MG disintegrating tablet, Place 1 tablet on the tongue Every 8 (Eight) Hours As Needed for Nausea or Vomiting., Disp: 12 tablet, Rfl: 0    propranolol (INDERAL) 10 MG tablet, , Disp: , Rfl:     tiZANidine (ZANAFLEX) 4 MG  "tablet, Take 1 tablet by mouth Every Night., Disp: , Rfl:         Physical Exam:     Vitals:    06/04/25 1343   Weight: 55.8 kg (123 lb)   Height: 175.3 cm (69.02\")   PainSc: 7    PainLoc: Back            General: Alert and oriented, No acute distress.   HEENT: Normocephalic, atraumatic.   Cardiovascular: No gross edema  Respiratory: Respirations are non-labored    Cervical Spine:   No masses or atrophy  Range of motion - Flexion normal. Extension normal. Lateral rotation normal.   Palpation -tender bilaterally  Facet loading-tender bilaterally  Spurling's -positive right    Tender to palpation at bilateral parascapular border.      Lumbar:  Inspection: no gross abnormality  ROM: Limited secondary to pain  Facet Loading: positive bilaterally  Paraspinal muscle palpation: Positive left  Spinous process palpation: nontender  Facet palpation: Positive bilateral  MOTOR: LLE: 5/5 throughout RLE: 5/5 throughout  REFLEX: 2+ reflexes in BLE with negative ankle clonus bilaterally  Straight Leg Raise: Positive bilaterally  Negative Jenna's bilaterally       Motor Exam:    Strength: Rate on 1-5 scale Right Left    C5-Elbow flexion, Deltoid 5/5  5/5    C6-Wrist extension 5/5  5/5    C7- Elbow / finger extension 5/5  5/5    C8- Finger flexion 5/5  5/5    T1- Intrinsics hand 5/5  5/5       Sensory Exam: Altered sensation right C7 dermatome.       Neurologic: Cranial Nerves II-XII are grossly intact.   Jeff’s -negative bilaterally      Psychiatric: Cooperative.   Gait: Normal   Assistive Devices: None    Imaging Studies:   Results for orders placed during the hospital encounter of 07/22/21    MRI Lumbar Spine Without Contrast    Narrative  EXAMINATION: MRI LUMBAR SPINE WO CONTRAST-    INDICATION: Radiculopathy; M54.9-Dorsalgia, unspecified.    TECHNIQUE: Routine multiplanar imaging was obtained of the lumbar spine  without the administration of gadolinium contrast.    COMPARISON: None.    FINDINGS: The vertebral body " height is preserved. There is mild disc  desiccation seen at the L2-L3 level. The facets are well aligned. Normal  signal intensity within the conus. Spinal cord terminates at the T12-L1  disc space. Facets are well aligned. Pedicles are intact. No abnormal  mass or fluid collection is seen within the paraspinal muscles.    Axial imaging reveals at the L1-L2 level no significant central spinal  canal stenosis or nerve root contact or compromise.    At the L2-L3 level, there is a broad-based disc bulge with some  lateralization to the left creating narrowing of the left neuroforamina.  No definite nerve root contact or compromise.    At the L3-L4 level, there is a broad-based disc bulge with some  lateralization to the left creating narrowing of the left neuroforamina.  No nerve root contact or compromise.    At the L4-L5 disc space, there is no significant central spinal canal  stenosis or nerve root contact or compromise. No neural foraminal  narrowing.    At the L5-S1 level, there is no significant central spinal canal  stenosis or nerve root contact or compromise.    Impression  There is small posterior disc osteophyte complexes creating  no significant central spinal canal stenosis or nerve root contact or  compromise. No neuroforaminal narrowing.    D:  07/22/2021  E:  07/22/2021    This report was finalized on 7/23/2021 5:57 PM by Dr. Mya Tena MD.        Independent review of radiographic imaging: Available for my interpretation is a cervical MRI dated August 19, 2024 demonstrating presence of C5/C6 anterior fusion hardware; bilateral C6/C7 neuroforaminal stenosis.    Impression & Plan:       09/23/2024: Sharad Chun is a 49 y.o. male with past medical history significant for C5-6 ACDF with Dr. Metcalf 2021, right rotator cuff repair, COPD, GERD, opioid use disorder (Suboxone) who presents to the pain clinic for evaluation and treatment of chronic neck pain with radiation right upper extremity and  hand.  11/4/2024: Good relief of right upper extremity pain after DARIA.  Will plan for bilateral CMBB for continued axial neck pain.  Briefly discussed LESI for low back pain with radiation to the bilateral lower extremities.  Patient politely declined.  11/12/2024: Proceed with cervical medial branch blocks as originally planned.  Will obtain new compliance UDS.  If UDS is compliant will continue to refill gabapentin.  If UDS is not compliant we will proceed with a gabapentin taper.  Patient voiced understanding  1/30/2025: Modest relief from bilateral cervical RFA ongoing.  Low back pain consistent with lumbosacral spondylosis.  Discussed bilateral L2/3 and L3/4 MBB/RFA.  Patient would like to proceed.  6/4/2025: Minimal relief from LMBB.  Evaluation consistent with lumbosacral facet spondylosis, overlying myofascial pain.  Previous lumbar MRI report from March 2024 suggests lumbar spondylosis with mild L2-3 spinal canal stenosis.    1. Lumbosacral spondylosis without myelopathy    2. Cervical spondylosis without myelopathy    3. Cervical radiculopathy at C7    4. Periscapular pain of both shoulders    5. Lumbar radiculopathy    6. Myofascial low back pain                PLAN:  1. Medication Recommendations:   Poor candidate for controlled substances given history of noncompliant urine drug screens    2. Physical Therapy: Start PT    3. Psychological: defer    4. Complementary and alternative (CAM) Therapies: Recommend heat, ice, TENS unit.    5. Labs/Diagnostic studies:     6. Imaging: Lumbar MRI report reviewed from 3/11/2024.  I do not have these images for personal interpretation    7. Interventions: Can contemplate lumbar trigger point steroid injections  Can consider repeat right paramedian cervical interlaminar epidural steroid injection ([id number]).    8. Referrals: PT    9. Records:     10. Lifestyle goals:    Follow-up 3-4 months      [facility]  [place] Elvis Crisp,  PA-C          Quality Metrics:

## (undated) DEVICE — SPNG GZ WOVN 4X4IN 12PLY 10/BX STRL

## (undated) DEVICE — GLV SURG PREMIERPRO MIC LTX PF SZ8 BRN

## (undated) DEVICE — STRAP POSTN KN/BDY FM 5X72IN DISP

## (undated) DEVICE — NDL HYPO ECLPS SFTY 25G 1 1/2IN

## (undated) DEVICE — KITTNER SPONGE: Brand: DEROYAL

## (undated) DEVICE — ANTIBACTERIAL UNDYED BRAIDED (POLYGLACTIN 910), SYNTHETIC ABSORBABLE SUTURE: Brand: COATED VICRYL

## (undated) DEVICE — SUT SILK 2/0 TIES 18IN A185H

## (undated) DEVICE — ELECTRD BLD EZ CLN MOD XLNG 2.75IN

## (undated) DEVICE — SUCTION CANISTER, 2500CC, RIGID: Brand: DEROYAL

## (undated) DEVICE — BANDAGE,GAUZE,BULKEE II,4.5"X4.1YD,STRL: Brand: MEDLINE

## (undated) DEVICE — C-ARM: Brand: UNBRANDED

## (undated) DEVICE — GLV SURG BIOGEL LTX PF 8

## (undated) DEVICE — SOL ISO/ALC RUB 70PCT 4OZ

## (undated) DEVICE — BLANKT WARM LOWR/BDY 100X120CM

## (undated) DEVICE — GLOW 'N TELL 30CM TAPE (20 STRIPS): Brand: VASCUTAPE RADIOPAQUE TAPE

## (undated) DEVICE — 3M™ STERI-DRAPE™ INSTRUMENT POUCH 1018: Brand: STERI-DRAPE™

## (undated) DEVICE — SCRB SURG BACTOSHIELD CHG 4PCT 4OZ

## (undated) DEVICE — SURGICAL INSTRUMENTS COMPRESSION PIN 14MM
Type: IMPLANTABLE DEVICE | Site: SPINE CERVICAL | Status: NON-FUNCTIONAL
Removed: 2021-08-04

## (undated) DEVICE — Device

## (undated) DEVICE — NEEDLE, QUINCKE, 20GX3.5": Brand: MEDLINE

## (undated) DEVICE — SYR CONTRL LUERLOK 10CC

## (undated) DEVICE — APPL CHLORAPREP TINTED 26ML TEAL

## (undated) DEVICE — FORCEP SPEC RETRV BX AD 2 MMX155 CM 5 MM GI OVL CUP W/ NDL

## (undated) DEVICE — DRP MICROSCOPE 4 BINOCULAR CV 54X150IN

## (undated) DEVICE — LIMB HOLDER, WRIST/ANKLE: Brand: DEROYAL

## (undated) DEVICE — ADHS LIQ MASTISOL 2/3ML

## (undated) DEVICE — DISPOSABLE BIPOLAR FORCEPS 7 3/4" (19.7CM) SCOVILLE BAYONET, INSULATED, 1.5MM TIP AND 12 FT. (3.6M) CABLE: Brand: KIRWAN

## (undated) DEVICE — SKYLINE ANTERIOR CERVICAL PLATE SYSTEM DRILL 2.2 X 12MM: Brand: SKYLINE

## (undated) DEVICE — PK NEURO DISC 10

## (undated) DEVICE — PATIENT RETURN ELECTRODE, SINGLE-USE, CONTACT QUALITY MONITORING, ADULT, WITH 9FT CORD, FOR PATIENTS WEIGING OVER 33LBS. (15KG): Brand: MEGADYNE

## (undated) DEVICE — 3M™ STERI-STRIP™ REINFORCED ADHESIVE SKIN CLOSURES, R1547, 1/2 IN X 4 IN (12 MM X 100 MM), 6 STRIPS/ENVELOPE: Brand: 3M™ STERI-STRIP™

## (undated) DEVICE — DRSNG WND BORDR/ADHS NONADHR/GZ LF 4X4IN STRL

## (undated) DEVICE — PENCL ROCKRSWCH MEGADYNE W/HOLSTR 10FT SS

## (undated) DEVICE — TOOL 14MH30 LEGEND 14CM 3MM: Brand: MIDAS REX ™